# Patient Record
Sex: MALE | Race: WHITE | URBAN - METROPOLITAN AREA
[De-identification: names, ages, dates, MRNs, and addresses within clinical notes are randomized per-mention and may not be internally consistent; named-entity substitution may affect disease eponyms.]

---

## 2017-01-03 DIAGNOSIS — F98.8 ADD (ATTENTION DEFICIT DISORDER): ICD-10-CM

## 2017-01-03 RX ORDER — DEXTROAMPHETAMINE SACCHARATE, AMPHETAMINE ASPARTATE, DEXTROAMPHETAMINE SULFATE AND AMPHETAMINE SULFATE 2.5; 2.5; 2.5; 2.5 MG/1; MG/1; MG/1; MG/1
10 TABLET ORAL
Qty: 30 TAB | Refills: 0 | Status: SHIPPED | OUTPATIENT
Start: 2017-01-03 | End: 2017-02-01 | Stop reason: SDUPTHER

## 2017-01-03 RX ORDER — DEXTROAMPHETAMINE SACCHARATE, AMPHETAMINE ASPARTATE MONOHYDRATE, DEXTROAMPHETAMINE SULFATE AND AMPHETAMINE SULFATE 7.5; 7.5; 7.5; 7.5 MG/1; MG/1; MG/1; MG/1
30 CAPSULE, EXTENDED RELEASE ORAL DAILY
Qty: 30 CAP | Refills: 0 | Status: SHIPPED | OUTPATIENT
Start: 2017-01-03 | End: 2017-02-01 | Stop reason: SDUPTHER

## 2017-02-01 DIAGNOSIS — F98.8 ADD (ATTENTION DEFICIT DISORDER): ICD-10-CM

## 2017-02-01 RX ORDER — DEXTROAMPHETAMINE SACCHARATE, AMPHETAMINE ASPARTATE, DEXTROAMPHETAMINE SULFATE AND AMPHETAMINE SULFATE 2.5; 2.5; 2.5; 2.5 MG/1; MG/1; MG/1; MG/1
10 TABLET ORAL
Qty: 30 TAB | Refills: 0 | Status: SHIPPED | OUTPATIENT
Start: 2017-02-01 | End: 2017-03-03 | Stop reason: SDUPTHER

## 2017-02-01 RX ORDER — DEXTROAMPHETAMINE SACCHARATE, AMPHETAMINE ASPARTATE MONOHYDRATE, DEXTROAMPHETAMINE SULFATE AND AMPHETAMINE SULFATE 7.5; 7.5; 7.5; 7.5 MG/1; MG/1; MG/1; MG/1
30 CAPSULE, EXTENDED RELEASE ORAL DAILY
Qty: 30 CAP | Refills: 0 | Status: SHIPPED | OUTPATIENT
Start: 2017-02-01 | End: 2017-03-03 | Stop reason: SDUPTHER

## 2017-02-01 NOTE — TELEPHONE ENCOUNTER
----- Message from Romulo Louis sent at 2/1/2017 11:30 AM EST -----  Regarding: Dr. Desiree Btehea   Pt called requesting two prescriptions for Adderall 10mg 30mg. Pt best contact number is [phone].

## 2017-03-03 DIAGNOSIS — F98.8 ADD (ATTENTION DEFICIT DISORDER): ICD-10-CM

## 2017-03-03 RX ORDER — DEXTROAMPHETAMINE SACCHARATE, AMPHETAMINE ASPARTATE MONOHYDRATE, DEXTROAMPHETAMINE SULFATE AND AMPHETAMINE SULFATE 7.5; 7.5; 7.5; 7.5 MG/1; MG/1; MG/1; MG/1
30 CAPSULE, EXTENDED RELEASE ORAL DAILY
Qty: 30 CAP | Refills: 0 | Status: SHIPPED | OUTPATIENT
Start: 2017-03-03 | End: 2017-03-31 | Stop reason: SDUPTHER

## 2017-03-03 RX ORDER — DEXTROAMPHETAMINE SACCHARATE, AMPHETAMINE ASPARTATE, DEXTROAMPHETAMINE SULFATE AND AMPHETAMINE SULFATE 2.5; 2.5; 2.5; 2.5 MG/1; MG/1; MG/1; MG/1
10 TABLET ORAL
Qty: 30 TAB | Refills: 0 | Status: SHIPPED | OUTPATIENT
Start: 2017-03-03 | End: 2017-03-31 | Stop reason: SDUPTHER

## 2017-03-03 NOTE — TELEPHONE ENCOUNTER
Tried to call home number, busy signal. Mobile number states that mailbox is full and can not except anymore messages. Scripts will be up front per Nurse Jennifer Leiva.

## 2017-03-31 DIAGNOSIS — F98.8 ADD (ATTENTION DEFICIT DISORDER): ICD-10-CM

## 2017-03-31 RX ORDER — DEXTROAMPHETAMINE SACCHARATE, AMPHETAMINE ASPARTATE, DEXTROAMPHETAMINE SULFATE AND AMPHETAMINE SULFATE 2.5; 2.5; 2.5; 2.5 MG/1; MG/1; MG/1; MG/1
10 TABLET ORAL
Qty: 30 TAB | Refills: 0 | Status: SHIPPED | OUTPATIENT
Start: 2017-04-03 | End: 2017-05-01 | Stop reason: SDUPTHER

## 2017-03-31 RX ORDER — DEXTROAMPHETAMINE SACCHARATE, AMPHETAMINE ASPARTATE MONOHYDRATE, DEXTROAMPHETAMINE SULFATE AND AMPHETAMINE SULFATE 7.5; 7.5; 7.5; 7.5 MG/1; MG/1; MG/1; MG/1
30 CAPSULE, EXTENDED RELEASE ORAL DAILY
Qty: 30 CAP | Refills: 0 | Status: SHIPPED | OUTPATIENT
Start: 2017-04-03 | End: 2017-05-02 | Stop reason: SDUPTHER

## 2017-03-31 NOTE — TELEPHONE ENCOUNTER
----- Message from [facility] sent at 3/30/2017  6:09 PM EDT -----  Regarding: Dr. George Robbins  Pt request a refill on Rx \"Adderrall 30mg extended release\" and \" Adderrall 10mg instant release\" . Pt use [facility] on file. Pt can be contacted at [phone].

## 2017-03-31 NOTE — LETTER
4/4/2017 8:36 AM 
 
Mr. Jennie Cameron [address] [address] Dear  Phoebe Peterson: We recently refilled your medications. Please call our office at [phone] and schedule a follow up appointment for your continued care and before next medication refill. Sincerely, Juan Hilliard MD

## 2017-04-01 NOTE — TELEPHONE ENCOUNTER
Medication refill authorized. Please encourage follow up for office visit appt.      Must follow up for further refills since it has been > 6 months for a controlled substance

## 2017-06-05 ENCOUNTER — OFFICE VISIT (OUTPATIENT)
Dept: INTERNAL MEDICINE CLINIC | Age: 22
End: 2017-06-05

## 2017-06-05 VITALS
WEIGHT: 122 LBS | RESPIRATION RATE: 16 BRPM | SYSTOLIC BLOOD PRESSURE: 120 MMHG | HEIGHT: 65 IN | TEMPERATURE: 98.5 F | HEART RATE: 106 BPM | BODY MASS INDEX: 20.33 KG/M2 | DIASTOLIC BLOOD PRESSURE: 73 MMHG | OXYGEN SATURATION: 100 %

## 2017-06-05 DIAGNOSIS — E53.8 B12 DEFICIENCY: ICD-10-CM

## 2017-06-05 DIAGNOSIS — J30.9 ALLERGIC RHINITIS, UNSPECIFIED ALLERGIC RHINITIS TRIGGER, UNSPECIFIED RHINITIS SEASONALITY: ICD-10-CM

## 2017-06-05 DIAGNOSIS — F98.8 ADD (ATTENTION DEFICIT DISORDER): Primary | ICD-10-CM

## 2017-06-05 DIAGNOSIS — E55.9 VITAMIN D DEFICIENCY: ICD-10-CM

## 2017-06-05 DIAGNOSIS — Z13.220 LIPID SCREENING: ICD-10-CM

## 2017-06-05 DIAGNOSIS — K21.9 GASTROESOPHAGEAL REFLUX DISEASE WITHOUT ESOPHAGITIS: ICD-10-CM

## 2017-06-05 RX ORDER — DEXTROAMPHETAMINE SACCHARATE, AMPHETAMINE ASPARTATE MONOHYDRATE, DEXTROAMPHETAMINE SULFATE AND AMPHETAMINE SULFATE 7.5; 7.5; 7.5; 7.5 MG/1; MG/1; MG/1; MG/1
30 CAPSULE, EXTENDED RELEASE ORAL DAILY
Qty: 30 CAP | Refills: 0 | Status: SHIPPED | OUTPATIENT
Start: 2017-06-05 | End: 2019-04-25 | Stop reason: SDUPTHER

## 2017-06-05 RX ORDER — DEXTROAMPHETAMINE SACCHARATE, AMPHETAMINE ASPARTATE, DEXTROAMPHETAMINE SULFATE AND AMPHETAMINE SULFATE 2.5; 2.5; 2.5; 2.5 MG/1; MG/1; MG/1; MG/1
10 TABLET ORAL
Qty: 30 TAB | Refills: 0 | Status: SHIPPED | OUTPATIENT
Start: 2017-07-05 | End: 2017-08-30 | Stop reason: SDUPTHER

## 2017-06-05 RX ORDER — MINERAL OIL
180 ENEMA (ML) RECTAL DAILY
Qty: 30 TAB | Refills: 11 | Status: SHIPPED | OUTPATIENT
Start: 2017-06-05

## 2017-06-05 RX ORDER — FLUTICASONE PROPIONATE 50 MCG
2 SPRAY, SUSPENSION (ML) NASAL DAILY
Qty: 1 BOTTLE | Refills: 5 | Status: SHIPPED | OUTPATIENT
Start: 2017-06-05

## 2017-06-05 RX ORDER — DEXTROAMPHETAMINE SACCHARATE, AMPHETAMINE ASPARTATE, DEXTROAMPHETAMINE SULFATE AND AMPHETAMINE SULFATE 2.5; 2.5; 2.5; 2.5 MG/1; MG/1; MG/1; MG/1
10 TABLET ORAL
Qty: 30 TAB | Refills: 0 | Status: SHIPPED | OUTPATIENT
Start: 2017-06-05 | End: 2017-08-30 | Stop reason: SDUPTHER

## 2017-06-05 RX ORDER — DEXTROAMPHETAMINE SACCHARATE, AMPHETAMINE ASPARTATE MONOHYDRATE, DEXTROAMPHETAMINE SULFATE AND AMPHETAMINE SULFATE 7.5; 7.5; 7.5; 7.5 MG/1; MG/1; MG/1; MG/1
30 CAPSULE, EXTENDED RELEASE ORAL DAILY
Qty: 30 CAP | Refills: 0 | Status: SHIPPED | OUTPATIENT
Start: 2017-07-05 | End: 2018-10-01 | Stop reason: SDUPTHER

## 2017-06-05 RX ORDER — DEXTROAMPHETAMINE SACCHARATE, AMPHETAMINE ASPARTATE MONOHYDRATE, DEXTROAMPHETAMINE SULFATE AND AMPHETAMINE SULFATE 7.5; 7.5; 7.5; 7.5 MG/1; MG/1; MG/1; MG/1
30 CAPSULE, EXTENDED RELEASE ORAL DAILY
Qty: 30 CAP | Refills: 0 | Status: SHIPPED | OUTPATIENT
Start: 2017-08-05 | End: 2018-05-31 | Stop reason: SDUPTHER

## 2017-06-05 RX ORDER — DEXTROAMPHETAMINE SACCHARATE, AMPHETAMINE ASPARTATE, DEXTROAMPHETAMINE SULFATE AND AMPHETAMINE SULFATE 2.5; 2.5; 2.5; 2.5 MG/1; MG/1; MG/1; MG/1
10 TABLET ORAL
Qty: 30 TAB | Refills: 0 | Status: SHIPPED | OUTPATIENT
Start: 2017-08-05 | End: 2017-08-30 | Stop reason: SDUPTHER

## 2017-06-05 NOTE — PROGRESS NOTES
rm 13    Chief Complaint   Patient presents with    Medication Evaluation     f/u     1. Have you been to the ER, urgent care clinic since your last visit? Hospitalized since your last visit? No    2. Have you seen or consulted any other health care providers outside of the [address] [place] [address] since your last visit? Include any pap smears or colon screening.  No     Health Maintenance Due   Topic Date Due    DTaP/Tdap/Td series (1 - Tdap) 02/16/2014     PHQ over the last two weeks 6/5/2017   Little interest or pleasure in doing things Not at all   Feeling down, depressed or hopeless Not at all   Total Score PHQ 2 0       No living will on file, information given with AVS

## 2017-06-05 NOTE — MR AVS SNAPSHOT
Visit Information Date & Time Provider Department Dept. Phone Encounter #  
 6/5/2017  1:30 PM Trang Larios, [address] [place] [address] and Internal Medicine [phone] [id number] Follow-up Instructions Return in about 4 months (around 10/5/2017), or if symptoms worsen or fail to improve, for ADD. Upcoming Health Maintenance Date Due DTaP/Tdap/Td series (1 - Tdap) 2/16/2014 INFLUENZA AGE 9 TO ADULT 8/1/2017 Allergies as of 6/5/2017  Review Complete On: 6/5/2017 By: Trang Larios MD  
 No Known Allergies Current Immunizations  Reviewed on 5/29/2015 No immunizations on file. Not reviewed this visit You Were Diagnosed With   
  
 Codes Comments ADD (attention deficit disorder)    -  Primary ICD-10-CM: F98.8 ICD-9-CM: 314.00 Allergic rhinitis, unspecified allergic rhinitis trigger, unspecified rhinitis seasonality     ICD-10-CM: J30.9 ICD-9-CM: 477.9 Gastroesophageal reflux disease without esophagitis     ICD-10-CM: K21.9 ICD-9-CM: 530.81 Vitamin D deficiency     ICD-10-CM: E55.9 ICD-9-CM: 268.9 B12 deficiency     ICD-10-CM: E53.8 ICD-9-CM: 266.2 Lipid screening     ICD-10-CM: F58.973 ICD-9-CM: V77.91 Vitals BP Pulse Temp Resp Height(growth percentile) Weight(growth percentile) 120/73 (BP 1 Location: Left arm, BP Patient Position: Sitting) (!) 106 98.5 °F (36.9 °C) (Oral) 16 5' 5\" (1.651 m) 122 lb (55.3 kg) SpO2 BMI Smoking Status 100% 20.3 kg/m2 Former Smoker BMI and BSA Data Body Mass Index Body Surface Area  
 20.3 kg/m 2 1.59 m 2 Preferred Pharmacy Pharmacy Name Phone [id number] [phone] Your Updated Medication List  
  
   
This list is accurate as of: 6/5/17  2:03 PM.  Always use your most recent med list.  
  
  
  
  
 cholecalciferol 1,000 unit tablet Commonly known as:  VITAMIN D3 Take 1 Tab by mouth daily. cyanocobalamin 500 mcg tablet Commonly known as:  B-12 DOTS Take 1 Tab by mouth daily. * amphetamine-dextroamphetamine XR 30 mg XR capsule Commonly known as:  ADDERALL XR Take 1 Cap (30 mg total) by mouth daily. Max Daily Amount: 30 mg  
  
 * dextroamphetamine-amphetamine 10 mg tablet Commonly known as:  ADDERALL Take 1 Tab (10 mg total) by mouth daily as needed. With evening classes/work * amphetamine-dextroamphetamine XR 30 mg XR capsule Commonly known as:  ADDERALL XR Take 1 Cap (30 mg total) by mouth dailyEarliest Fill Date: 7/5/17. Max Daily Amount: 30 mg  
Start taking on:  7/5/2017 * dextroamphetamine-amphetamine 10 mg tablet Commonly known as:  ADDERALL Take 1 Tab (10 mg total) by mouth daily as neededEarliest Fill Date: 7/5/17. With evening classes/work Start taking on:  7/5/2017 * dextroamphetamine-amphetamine 10 mg tablet Commonly known as:  ADDERALL Take 1 Tab (10 mg total) by mouth daily as neededEarliest Fill Date: 8/5/17. With evening classes/work Start taking on:  8/5/2017 * amphetamine-dextroamphetamine XR 30 mg XR capsule Commonly known as:  ADDERALL XR Take 1 Cap (30 mg total) by mouth dailyEarliest Fill Date: 8/5/17. Max Daily Amount: 30 mg  
Start taking on:  8/5/2017  
  
 fexofenadine 180 mg tablet Commonly known as:  Melanie Don Take 1 Tab by mouth daily. fluticasone 50 mcg/actuation nasal spray Commonly known as:  Catarina Fray 2 Sprays by Both Nostrils route daily. Omeprazole delayed release 20 mg tablet Commonly known as:  PRILOSEC D/R Take 1 Tab by mouth daily. * Notice: This list has 6 medication(s) that are the same as other medications prescribed for you. Read the directions carefully, and ask your doctor or other care provider to review them with you. Prescriptions Printed Refills dextroamphetamine-amphetamine (ADDERALL) 10 mg tablet 0 Starting on: 2017 Sig: Take 1 Tab (10 mg total) by mouth daily as neededEarliest Fill Date: 17. With evening classes/work Class: Print Route: Oral  
 amphetamine-dextroamphetamine XR (ADDERALL XR) 30 mg XR capsule 0 Starting on: 2017 Sig: Take 1 Cap (30 mg total) by mouth dailyEarliest Fill Date: 17. Max Daily Amount: 30 mg  
 Class: Print Route: Oral  
 amphetamine-dextroamphetamine XR (ADDERALL XR) 30 mg XR capsule 0 Starting on: 2017 Sig: Take 1 Cap (30 mg total) by mouth dailyEarliest Fill Date: 17. Max Daily Amount: 30 mg  
 Class: Print Route: Oral  
 amphetamine-dextroamphetamine XR (ADDERALL XR) 30 mg XR capsule 0 Sig: Take 1 Cap (30 mg total) by mouth daily. Max Daily Amount: 30 mg  
 Class: Print Route: Oral  
 dextroamphetamine-amphetamine (ADDERALL) 10 mg tablet 0 Starting on: 2017 Sig: Take 1 Tab (10 mg total) by mouth daily as neededEarliest Fill Date: 17. With evening classes/work Class: Print Route: Oral  
 dextroamphetamine-amphetamine (ADDERALL) 10 mg tablet 0 Sig: Take 1 Tab (10 mg total) by mouth daily as needed. With evening classes/work Class: Print Route: Oral  
  
Prescriptions Sent to Pharmacy Refills  
 fluticasone (FLONASE) 50 mcg/actuation nasal spray 5 Si Sprays by Both Nostrils route daily. Class: Normal  
 Pharmacy: [id number] - [address] Ph #: [phone] Route: Both Nostrils  
 fexofenadine (ALLEGRA) 180 mg tablet 11 Sig: Take 1 Tab by mouth daily. Class: Normal  
 Pharmacy: [facility]/pharmacy #[id number] - [address] Ph #: [phone] Route: Oral  
  
Follow-up Instructions Return in about 4 months (around 10/5/2017), or if symptoms worsen or fail to improve, for ADD. To-Do List   
 Around 06/08/2017 Lab:  CBC WITH AUTOMATED DIFF Around 06/08/2017 Lab:  LIPID PANEL Around 06/08/2017 Lab:  METABOLIC PANEL, COMPREHENSIVE Around 06/08/2017 Lab:  VITAMIN B12 Around 06/08/2017 Lab:  VITAMIN D, 25 HYDROXY Patient Instructions Ozzy Alegre 1721 What is a living will? A living will is a legal form you use to write down the kind of care you want at the end of your life. It is used by the health professionals who will treat you if you aren't able to decide for yourself. If you put your wishes in writing, your loved ones and others will know what kind of care you want. They won't need to guess. This can ease your mind and be helpful to others. A living will is not the same as an estate or property will. An estate will explains what you want to happen with your money and property after you die. Is a living will a legal document? A living will is a legal document. Each state has its own laws about living nguyen. If you move to another state, make sure that your living will is legal in the state where you now live. Or you might use a universal form that has been approved by many states. This kind of form can sometimes be completed and stored online. Your electronic copy will then be available wherever you have a connection to the Internet. In most cases, doctors will respect your wishes even if you have a form from a different state. · You don't need an  to complete a living will. But legal advice can be helpful if your state's laws are unclear, your health history is complicated, or your family can't agree on what should be in your living will. · You can change your living will at any time. Some people find that their wishes about end-of-life care change as their health changes.  
· In addition to making a living will, think about completing a medical power of  form. This form lets you name the person you want to make end-of-life treatment decisions for you (your \"health care agent\") if you're not able to. Many hospitals and nursing homes will give you the forms you need to complete a living will and a medical power of . · Your living will is used only if you can't make or communicate decisions for yourself anymore. If you become able to make decisions again, you can accept or refuse any treatment, no matter what you wrote in your living will. · Your state may offer an online registry. This is a place where you can store your living will online so the doctors and nurses who need to treat you can find it right away. What should you think about when creating a living will? Talk about your end-of-life wishes with your family members and your doctor. Let them know what you want. That way the people making decisions for you won't be surprised by your choices. Think about these questions as you make your living will: · Do you know enough about life support methods that might be used? If not, talk to your doctor so you know what might be done if you can't breathe on your own, your heart stops, or you're unable to swallow. · What things would you still want to be able to do after you receive life-support methods? Would you want to be able to walk? To speak? To eat on your own? To live without the help of machines? · If you have a choice, where do you want to be cared for? In your home? At a hospital or nursing home? · Do you want certain Jewish practices performed if you become very ill? · If you have a choice at the end of your life, where would you prefer to die? At home? In a hospital or nursing home? Somewhere else? · Would you prefer to be buried or cremated? · Do you want your organs to be donated after you die? What should you do with your living will?  
· Make sure that your family members and your health care agent have copies of your living will. · Give your doctor a copy of your living will to keep in your medical record. If you have more than one doctor, make sure that each one has a copy. · You may want to put a copy of your living will where it can be easily found. Where can you learn more? Go to http://elena-poppy.info/. Enter A488 in the search box to learn more about \"Learning About Living Paulo. \" Current as of: February 24, 2016 Content Version: 11.2 © 4008-4480 DailyBurn. Care instructions adapted under license by Boxxet (which disclaims liability or warranty for this information). If you have questions about a medical condition or this instruction, always ask your healthcare professional. [address] any warranty or liability for your use of this information. Introducing [facility]! Howard Part introduces [facility] patient portal. Now you can access parts of your medical record, email your doctor's office, and request medication refills online. 1. In your internet browser, go to [email or web address] 2. Click on the First Time User? Click Here link in the Sign In box. You will see the New Member Sign Up page. 3. Enter your [facility] Access Code exactly as it appears below. You will not need to use this code after youve completed the sign-up process. If you do not sign up before the expiration date, you must request a new code. · [facility] Access Code: [email or web address] Expires: 9/3/2017  2:03 PM 
 
4. Enter the last four digits of your Social Security Number (xxxx) and Date of Birth (mm/dd/yyyy) as indicated and click Submit. You will be taken to the next sign-up page. 5. Create a Stockpulset ID. This will be your [facility] login ID and cannot be changed, so think of one that is secure and easy to remember. 6. Create a Stockpulset password. You can change your password at any time. 7. Enter your Password Reset Question and Answer. This can be used at a later time if you forget your password. 8. Enter your e-mail address. You will receive e-mail notification when new information is available in [address] 9. Click Sign Up. You can now view and download portions of your medical record. 10. Click the Download Summary menu link to download a portable copy of your medical information. If you have questions, please visit the Frequently Asked Questions section of the PumpUp website. Remember, PumpUp is NOT to be used for urgent needs. For medical emergencies, dial 911. Now available from your iPhone and Android! Please provide this summary of care documentation to your next provider. Your primary care clinician is listed as [address] If you have any questions after today's visit, please call [phone].

## 2017-06-05 NOTE — PATIENT INSTRUCTIONS
Learning About Shashank Fox  What is a living will? A living will is a legal form you use to write down the kind of care you want at the end of your life. It is used by the health professionals who will treat you if you aren't able to decide for yourself. If you put your wishes in writing, your loved ones and others will know what kind of care you want. They won't need to guess. This can ease your mind and be helpful to others. A living will is not the same as an estate or property will. An estate will explains what you want to happen with your money and property after you die. Is a living will a legal document? A living will is a legal document. Each state has its own laws about living nguyen. If you move to another state, make sure that your living will is legal in the state where you now live. Or you might use a universal form that has been approved by many states. This kind of form can sometimes be completed and stored online. Your electronic copy will then be available wherever you have a connection to the Internet. In most cases, doctors will respect your wishes even if you have a form from a different state. · You don't need an  to complete a living will. But legal advice can be helpful if your state's laws are unclear, your health history is complicated, or your family can't agree on what should be in your living will. · You can change your living will at any time. Some people find that their wishes about end-of-life care change as their health changes. · In addition to making a living will, think about completing a medical power of  form. This form lets you name the person you want to make end-of-life treatment decisions for you (your \"health care agent\") if you're not able to. Many hospitals and nursing homes will give you the forms you need to complete a living will and a medical power of .   · Your living will is used only if you can't make or communicate decisions for yourself anymore. If you become able to make decisions again, you can accept or refuse any treatment, no matter what you wrote in your living will. · Your state may offer an online registry. This is a place where you can store your living will online so the doctors and nurses who need to treat you can find it right away. What should you think about when creating a living will? Talk about your end-of-life wishes with your family members and your doctor. Let them know what you want. That way the people making decisions for you won't be surprised by your choices. Think about these questions as you make your living will:  · Do you know enough about life support methods that might be used? If not, talk to your doctor so you know what might be done if you can't breathe on your own, your heart stops, or you're unable to swallow. · What things would you still want to be able to do after you receive life-support methods? Would you want to be able to walk? To speak? To eat on your own? To live without the help of machines? · If you have a choice, where do you want to be cared for? In your home? At a hospital or nursing home? · Do you want certain Protestant practices performed if you become very ill? · If you have a choice at the end of your life, where would you prefer to die? At home? In a hospital or nursing home? Somewhere else? · Would you prefer to be buried or cremated? · Do you want your organs to be donated after you die? What should you do with your living will? · Make sure that your family members and your health care agent have copies of your living will. · Give your doctor a copy of your living will to keep in your medical record. If you have more than one doctor, make sure that each one has a copy. · You may want to put a copy of your living will where it can be easily found. Where can you learn more? Go to http://elena-poppy.info/.   Enter O094 in the search box to learn more about \"Learning About Living Enzo Davis. \"  Current as of: February 24, 2016  Content Version: 11.2  © 6118-3082 58.com, Incorporated. Care instructions adapted under license by Pertino (which disclaims liability or warranty for this information). If you have questions about a medical condition or this instruction, always ask your healthcare professional. [address] any warranty or liability for your use of this information.

## 2017-06-05 NOTE — PROGRESS NOTES
HISTORY OF PRESENT ILLNESS  Vernon Carmona is a 23 y.o. male. HPI  Presents for f/u ADD    Pt found 30 mg adderall XR to be more effective than the 25 mg    Still gets benefit    Taking allegra and flonase with good benefit    GI sx stable. Past medical, Social, and Family history reviewed  Medications reviewed and updated. ROS  Complete ROS reviewed and negative or stable except as noted in HPI. Physical Exam   Constitutional: He is oriented to person, place, and time. He appears well-nourished. No distress. HENT:   Head: Normocephalic and atraumatic. Eyes: EOM are normal. Pupils are equal, round, and reactive to light. No scleral icterus. Neck: Normal range of motion. Neck supple. No JVD present. Cardiovascular: Normal rate, regular rhythm and normal heart sounds. Exam reveals no gallop and no friction rub. No murmur heard. Pulmonary/Chest: Effort normal and breath sounds normal. No respiratory distress. He has no wheezes. He has no rales. Abdominal: Soft. Bowel sounds are normal. He exhibits no distension. There is no tenderness. Musculoskeletal: Normal range of motion. He exhibits no edema. Lymphadenopathy:     He has no cervical adenopathy. Neurological: He is alert and oriented to person, place, and time. He exhibits normal muscle tone. Skin: Skin is warm. No rash noted. Psychiatric: He has a normal mood and affect. Nursing note and vitals reviewed. Prior labs reviewed. ASSESSMENT and PLAN    ICD-10-CM ICD-9-CM    1. ADD (attention deficit disorder) F98.8 314.00 dextroamphetamine-amphetamine (ADDERALL) 10 mg tablet      amphetamine-dextroamphetamine XR (ADDERALL XR) 30 mg XR capsule      amphetamine-dextroamphetamine XR (ADDERALL XR) 30 mg XR capsule      amphetamine-dextroamphetamine XR (ADDERALL XR) 30 mg XR capsule      dextroamphetamine-amphetamine (ADDERALL) 10 mg tablet      dextroamphetamine-amphetamine (ADDERALL) 10 mg tablet   2.  Allergic rhinitis, unspecified allergic rhinitis trigger, unspecified rhinitis seasonality J30.9 477.9 fluticasone (FLONASE) 50 mcg/actuation nasal spray      fexofenadine (ALLEGRA) 180 mg tablet   3. Gastroesophageal reflux disease without esophagitis K21.9 530.81 CBC WITH AUTOMATED DIFF      METABOLIC PANEL, COMPREHENSIVE   4. Vitamin D deficiency E55.9 268.9 VITAMIN D, 25 HYDROXY   5. B12 deficiency E53.8 266.2 VITAMIN B12   6. Lipid screening Z13.220 V77.91 LIPID PANEL     Follow-up Disposition:  Return in about 4 months (around 10/5/2017), or if symptoms worsen or fail to improve, for ADD.   results and schedule of future studies reviewed with patient  reviewed diet, exercise and weight   cardiovascular risk and specific lipid/LDL goals reviewed  reviewed medications and side effects in detail   Return for repeat labs  Refill meds  Continue current medications

## 2017-08-30 ENCOUNTER — OFFICE VISIT (OUTPATIENT)
Dept: INTERNAL MEDICINE CLINIC | Age: 22
End: 2017-08-30

## 2017-08-30 VITALS
OXYGEN SATURATION: 97 % | HEIGHT: 65 IN | DIASTOLIC BLOOD PRESSURE: 60 MMHG | BODY MASS INDEX: 21.06 KG/M2 | WEIGHT: 126.4 LBS | TEMPERATURE: 98.3 F | HEART RATE: 87 BPM | SYSTOLIC BLOOD PRESSURE: 96 MMHG | RESPIRATION RATE: 16 BRPM

## 2017-08-30 DIAGNOSIS — K21.9 GASTROESOPHAGEAL REFLUX DISEASE WITHOUT ESOPHAGITIS: ICD-10-CM

## 2017-08-30 DIAGNOSIS — F98.8 ADD (ATTENTION DEFICIT DISORDER): Primary | ICD-10-CM

## 2017-08-30 DIAGNOSIS — J30.9 ALLERGIC RHINITIS, UNSPECIFIED ALLERGIC RHINITIS TRIGGER, UNSPECIFIED RHINITIS SEASONALITY: ICD-10-CM

## 2017-08-30 RX ORDER — DEXTROAMPHETAMINE SACCHARATE, AMPHETAMINE ASPARTATE, DEXTROAMPHETAMINE SULFATE AND AMPHETAMINE SULFATE 3.75; 3.75; 3.75; 3.75 MG/1; MG/1; MG/1; MG/1
15 TABLET ORAL
Qty: 30 TAB | Refills: 0 | Status: SHIPPED | OUTPATIENT
Start: 2017-11-03 | End: 2017-12-01 | Stop reason: SDUPTHER

## 2017-08-30 RX ORDER — DEXTROAMPHETAMINE SACCHARATE, AMPHETAMINE ASPARTATE MONOHYDRATE, DEXTROAMPHETAMINE SULFATE AND AMPHETAMINE SULFATE 7.5; 7.5; 7.5; 7.5 MG/1; MG/1; MG/1; MG/1
30 CAPSULE, EXTENDED RELEASE ORAL DAILY
Qty: 30 CAP | Refills: 0 | Status: SHIPPED | OUTPATIENT
Start: 2017-10-04 | End: 2017-11-02

## 2017-08-30 RX ORDER — DEXTROAMPHETAMINE SACCHARATE, AMPHETAMINE ASPARTATE MONOHYDRATE, DEXTROAMPHETAMINE SULFATE AND AMPHETAMINE SULFATE 7.5; 7.5; 7.5; 7.5 MG/1; MG/1; MG/1; MG/1
30 CAPSULE, EXTENDED RELEASE ORAL DAILY
Qty: 30 CAP | Refills: 0 | Status: SHIPPED | OUTPATIENT
Start: 2017-11-03 | End: 2017-12-02

## 2017-08-30 RX ORDER — DEXTROAMPHETAMINE SACCHARATE, AMPHETAMINE ASPARTATE MONOHYDRATE, DEXTROAMPHETAMINE SULFATE AND AMPHETAMINE SULFATE 7.5; 7.5; 7.5; 7.5 MG/1; MG/1; MG/1; MG/1
30 CAPSULE, EXTENDED RELEASE ORAL DAILY
Qty: 30 CAP | Refills: 0 | Status: SHIPPED | OUTPATIENT
Start: 2017-09-04 | End: 2017-10-03

## 2017-08-30 RX ORDER — DEXTROAMPHETAMINE SACCHARATE, AMPHETAMINE ASPARTATE, DEXTROAMPHETAMINE SULFATE AND AMPHETAMINE SULFATE 3.75; 3.75; 3.75; 3.75 MG/1; MG/1; MG/1; MG/1
15 TABLET ORAL
Qty: 30 TAB | Refills: 0 | Status: SHIPPED | OUTPATIENT
Start: 2017-10-04 | End: 2017-12-01 | Stop reason: SDUPTHER

## 2017-08-30 RX ORDER — DEXTROAMPHETAMINE SACCHARATE, AMPHETAMINE ASPARTATE, DEXTROAMPHETAMINE SULFATE AND AMPHETAMINE SULFATE 3.75; 3.75; 3.75; 3.75 MG/1; MG/1; MG/1; MG/1
15 TABLET ORAL
Qty: 30 TAB | Refills: 0 | Status: SHIPPED | OUTPATIENT
Start: 2017-09-04 | End: 2017-12-01 | Stop reason: SDUPTHER

## 2017-08-30 NOTE — PROGRESS NOTES
Rm 15    Chief Complaint   Patient presents with    Medication Evaluation     1. Have you been to the ER, urgent care clinic since your last visit? Hospitalized since your last visit? No    2. Have you seen or consulted any other health care providers outside of the [facility] since your last visit? Include any pap smears or colon screening.  No    Health Maintenance Due   Topic Date Due    DTaP/Tdap/Td series (1 - Tdap) 02/16/2014    INFLUENZA AGE 9 TO ADULT  08/01/2017     PHQ over the last two weeks 8/30/2017   Little interest or pleasure in doing things Not at all   Feeling down, depressed or hopeless Not at all   Total Score PHQ 2 0

## 2017-08-30 NOTE — MR AVS SNAPSHOT
Visit Information Date & Time Provider Department Dept. Phone Encounter #  
 8/30/2017  4:30 PM Meron Samaniego [place] [address] and Internal Medicine [phone] [id number] Follow-up Instructions Return in about 3 months (around 11/30/2017), or if symptoms worsen or fail to improve, for ADHD. Upcoming Health Maintenance Date Due DTaP/Tdap/Td series (1 - Tdap) 2/16/2014 INFLUENZA AGE 9 TO ADULT 8/1/2017 Allergies as of 8/30/2017  Review Complete On: 8/30/2017 By: Dominique Packer MD  
 No Known Allergies Current Immunizations  Reviewed on 5/29/2015 No immunizations on file. Not reviewed this visit You Were Diagnosed With   
  
 Codes Comments ADD (attention deficit disorder)    -  Primary ICD-10-CM: F98.8 ICD-9-CM: 314.00 Allergic rhinitis, unspecified allergic rhinitis trigger, unspecified rhinitis seasonality     ICD-10-CM: J30.9 ICD-9-CM: 477.9 Gastroesophageal reflux disease without esophagitis     ICD-10-CM: K21.9 ICD-9-CM: 530.81 Vitals BP Pulse Temp Resp Height(growth percentile) Weight(growth percentile) 96/60 (BP 1 Location: Right arm, BP Patient Position: Sitting) 87 98.3 °F (36.8 °C) (Oral) 16 5' 5\" (1.651 m) 126 lb 6.4 oz (57.3 kg) SpO2 BMI Smoking Status 97% 21.03 kg/m2 Former Smoker BMI and BSA Data Body Mass Index Body Surface Area 21.03 kg/m 2 1.62 m 2 Preferred Pharmacy Pharmacy Name Phone [facility]/PHARMACY #[id number] [facility]ad [facility] JIM FERNANDEZ/ Irineo Adams [facility] [phone] Your Updated Medication List  
  
   
This list is accurate as of: 8/30/17  5:19 PM.  Always use your most recent med list.  
  
  
  
  
 * amphetamine-dextroamphetamine XR 30 mg XR capsule Commonly known as:  ADDERALL XR Take 1 Cap (30 mg total) by mouth daily. Max Daily Amount: 30 mg  
  
 * amphetamine-dextroamphetamine XR 30 mg XR capsule Commonly known as:  ADDERALL XR Take 1 Cap (30 mg total) by mouth dailyEarliest Fill Date: 7/5/17. Max Daily Amount: 30 mg  
  
 * amphetamine-dextroamphetamine XR 30 mg XR capsule Commonly known as:  ADDERALL XR Take 1 Cap (30 mg total) by mouth dailyEarliest Fill Date: 8/5/17. Max Daily Amount: 30 mg  
  
 * amphetamine-dextroamphetamine XR 30 mg XR capsule Commonly known as:  ADDERALL XR Take 1 Cap (30 mg total) by mouth dailyEarliest Fill Date: 9/4/17. Max Daily Amount: 30 mg  
Start taking on:  9/4/2017 * dextroamphetamine-amphetamine 15 mg tablet Commonly known as:  ADDERALL Take 1 Tab (15 mg total) by mouth daily as neededEarliest Fill Date: 9/4/17. With evening classes/work Start taking on:  9/4/2017 * amphetamine-dextroamphetamine XR 30 mg XR capsule Commonly known as:  ADDERALL XR Take 1 Cap (30 mg total) by mouth dailyEarliest Fill Date: 10/4/17. Max Daily Amount: 30 mg  
Start taking on:  10/4/2017 * dextroamphetamine-amphetamine 15 mg tablet Commonly known as:  ADDERALL Take 1 Tab (15 mg total) by mouth daily as neededEarliest Fill Date: 10/4/17. With evening classes/work Start taking on:  10/4/2017 * amphetamine-dextroamphetamine XR 30 mg XR capsule Commonly known as:  ADDERALL XR Take 1 Cap (30 mg total) by mouth dailyEarliest Fill Date: 11/3/17. Max Daily Amount: 30 mg  
Start taking on:  11/3/2017 * dextroamphetamine-amphetamine 15 mg tablet Commonly known as:  ADDERALL Take 1 Tab (15 mg total) by mouth daily as neededEarliest Fill Date: 11/3/17. With evening classes/work Start taking on:  11/3/2017  
  
 cholecalciferol 1,000 unit tablet Commonly known as:  VITAMIN D3 Take 1 Tab by mouth daily. cyanocobalamin 500 mcg tablet Commonly known as:  B-12 DOTS Take 1 Tab by mouth daily. fexofenadine 180 mg tablet Commonly known as:  Ovid Ficieshae Take 1 Tab by mouth daily. fluticasone 50 mcg/actuation nasal spray Commonly known as:  Melrose Capes 2 Sprays by Both Nostrils route daily. Omeprazole delayed release 20 mg tablet Commonly known as:  PRILOSEC D/R Take 1 Tab by mouth daily. * Notice: This list has 9 medication(s) that are the same as other medications prescribed for you. Read the directions carefully, and ask your doctor or other care provider to review them with you. Prescriptions Printed Refills  
 amphetamine-dextroamphetamine XR (ADDERALL XR) 30 mg XR capsule 0 Starting on: 9/4/2017 Sig: Take 1 Cap (30 mg total) by mouth dailyEarliest Fill Date: 9/4/17. Max Daily Amount: 30 mg  
 Class: Print Route: Oral  
 amphetamine-dextroamphetamine XR (ADDERALL XR) 30 mg XR capsule 0 Starting on: 10/4/2017 Sig: Take 1 Cap (30 mg total) by mouth dailyEarliest Fill Date: 10/4/17. Max Daily Amount: 30 mg  
 Class: Print Route: Oral  
 amphetamine-dextroamphetamine XR (ADDERALL XR) 30 mg XR capsule 0 Starting on: 11/3/2017 Sig: Take 1 Cap (30 mg total) by mouth dailyEarliest Fill Date: 11/3/17. Max Daily Amount: 30 mg  
 Class: Print Route: Oral  
 dextroamphetamine-amphetamine (ADDERALL) 15 mg tablet 0 Starting on: 11/3/2017 Sig: Take 1 Tab (15 mg total) by mouth daily as neededEarliest Fill Date: 11/3/17. With evening classes/work Class: Print Route: Oral  
 dextroamphetamine-amphetamine (ADDERALL) 15 mg tablet 0 Starting on: 10/4/2017 Sig: Take 1 Tab (15 mg total) by mouth daily as neededEarliest Fill Date: 10/4/17. With evening classes/work Class: Print Route: Oral  
 dextroamphetamine-amphetamine (ADDERALL) 15 mg tablet 0 Starting on: 9/4/2017 Sig: Take 1 Tab (15 mg total) by mouth daily as neededEarliest Fill Date: 9/4/17. With evening classes/work Class: Print Route: Oral  
  
Follow-up Instructions  Return in about 3 months (around 11/30/2017), or if symptoms worsen or fail to improve, for ADHD. Introducing hospitals & HEALTH SERVICES! [facility] introduces [employer] patient portal. Now you can access parts of your medical record, email your doctor's office, and request medication refills online. 1. In your internet browser, go to [email or web address] 2. Click on the First Time User? Click Here link in the Sign In box. You will see the New Member Sign Up page. 3. Enter your [employer] Access Code exactly as it appears below. You will not need to use this code after youve completed the sign-up process. If you do not sign up before the expiration date, you must request a new code. · [employer] Access Code: [email or web address] Expires: 9/3/2017  2:03 PM 
 
4. Enter the last four digits of your Social Security Number (xxxx) and Date of Birth (mm/dd/yyyy) as indicated and click Submit. You will be taken to the next sign-up page. 5. Create a [employer] ID. This will be your [employer] login ID and cannot be changed, so think of one that is secure and easy to remember. 6. Create a [employer] password. You can change your password at any time. 7. Enter your Password Reset Question and Answer. This can be used at a later time if you forget your password. 8. Enter your e-mail address. You will receive e-mail notification when new information is available in [address] 9. Click Sign Up. You can now view and download portions of your medical record. 10. Click the Download Summary menu link to download a portable copy of your medical information. If you have questions, please visit the Frequently Asked Questions section of the [employer] website. Remember, [employer] is NOT to be used for urgent needs. For medical emergencies, dial 911. Now available from your iPhone and Android! Please provide this summary of care documentation to your next provider. Your primary care clinician is listed as [address]  If you have any questions after today's visit, please call [phone].

## 2017-08-30 NOTE — PROGRESS NOTES
HISTORY OF PRESENT ILLNESS  Uday Joya is a 23 y.o. male. HPI  Presents for f/u allergy, adhd    Using flonase, allegra    adderall XR at 30 mg and IR at 10 mg don't have same \"full\" effect in recent months  Though, +helpful and maintains fair effect and benefit    No adverse effects reported    Past medical, Social, and Family history reviewed  Medications reviewed and updated. ROS  Complete ROS reviewed and negative or stable except as noted in HPI. Physical Exam   Constitutional: He is oriented to person, place, and time. He appears well-nourished. No distress. HENT:   Head: Normocephalic and atraumatic. Eyes: EOM are normal. Pupils are equal, round, and reactive to light. No scleral icterus. Neck: Normal range of motion. Neck supple. No JVD present. Cardiovascular: Normal rate, regular rhythm and normal heart sounds. Exam reveals no gallop and no friction rub. No murmur heard. Pulmonary/Chest: Effort normal and breath sounds normal. No respiratory distress. He has no wheezes. He has no rales. Abdominal: Soft. Bowel sounds are normal. He exhibits no distension. There is no tenderness. Musculoskeletal: Normal range of motion. He exhibits no edema. Lymphadenopathy:     He has no cervical adenopathy. Neurological: He is alert and oriented to person, place, and time. He exhibits normal muscle tone. Skin: Skin is warm. No rash noted. Psychiatric: He has a normal mood and affect. Nursing note and vitals reviewed. Prior labs reviewed. ASSESSMENT and PLAN    ICD-10-CM ICD-9-CM    1. ADD (attention deficit disorder) F98.8 314.00 dextroamphetamine-amphetamine (ADDERALL) 15 mg tablet      dextroamphetamine-amphetamine (ADDERALL) 15 mg tablet      dextroamphetamine-amphetamine (ADDERALL) 15 mg tablet   2. Allergic rhinitis, unspecified allergic rhinitis trigger, unspecified rhinitis seasonality J30.9 477.9    3.  Gastroesophageal reflux disease without esophagitis K21.9 530.81 Follow-up Disposition:  Return in about 3 months (around 11/30/2017), or if symptoms worsen or fail to improve, for ADHD.   results and schedule of future studies reviewed with patient  reviewed diet, exercise and weight   reviewed medications and side effects in detail   Agree to increase to 15 mg IR PM dosing  Continue XR dose at 30 mg for now  Continue other meds

## 2017-12-01 ENCOUNTER — OFFICE VISIT (OUTPATIENT)
Dept: INTERNAL MEDICINE CLINIC | Age: 22
End: 2017-12-01

## 2017-12-01 VITALS
BODY MASS INDEX: 21.13 KG/M2 | SYSTOLIC BLOOD PRESSURE: 104 MMHG | HEIGHT: 65 IN | RESPIRATION RATE: 16 BRPM | DIASTOLIC BLOOD PRESSURE: 69 MMHG | TEMPERATURE: 97.7 F | OXYGEN SATURATION: 98 % | WEIGHT: 126.8 LBS | HEART RATE: 73 BPM

## 2017-12-01 DIAGNOSIS — E53.8 B12 DEFICIENCY: ICD-10-CM

## 2017-12-01 DIAGNOSIS — E55.9 VITAMIN D DEFICIENCY: ICD-10-CM

## 2017-12-01 DIAGNOSIS — F98.8 ATTENTION DEFICIT DISORDER, UNSPECIFIED HYPERACTIVITY PRESENCE: Primary | ICD-10-CM

## 2017-12-01 DIAGNOSIS — Z00.00 WELL ADULT EXAM: ICD-10-CM

## 2017-12-01 DIAGNOSIS — J30.9 ALLERGIC RHINITIS, UNSPECIFIED CHRONICITY, UNSPECIFIED SEASONALITY, UNSPECIFIED TRIGGER: ICD-10-CM

## 2017-12-01 RX ORDER — DEXTROAMPHETAMINE SACCHARATE, AMPHETAMINE ASPARTATE MONOHYDRATE, DEXTROAMPHETAMINE SULFATE AND AMPHETAMINE SULFATE 7.5; 7.5; 7.5; 7.5 MG/1; MG/1; MG/1; MG/1
30 CAPSULE, EXTENDED RELEASE ORAL DAILY
Qty: 30 CAP | Refills: 0 | Status: SHIPPED | OUTPATIENT
Start: 2018-01-30 | End: 2018-02-28

## 2017-12-01 RX ORDER — DEXTROAMPHETAMINE SACCHARATE, AMPHETAMINE ASPARTATE, DEXTROAMPHETAMINE SULFATE AND AMPHETAMINE SULFATE 3.75; 3.75; 3.75; 3.75 MG/1; MG/1; MG/1; MG/1
15 TABLET ORAL
Qty: 30 TAB | Refills: 0 | Status: CANCELLED | OUTPATIENT
Start: 2017-12-01

## 2017-12-01 RX ORDER — DEXTROAMPHETAMINE SACCHARATE, AMPHETAMINE ASPARTATE, DEXTROAMPHETAMINE SULFATE AND AMPHETAMINE SULFATE 3.75; 3.75; 3.75; 3.75 MG/1; MG/1; MG/1; MG/1
15 TABLET ORAL
Qty: 30 TAB | Refills: 0 | Status: SHIPPED | OUTPATIENT
Start: 2017-12-01 | End: 2018-05-31 | Stop reason: SDUPTHER

## 2017-12-01 RX ORDER — DEXTROAMPHETAMINE SACCHARATE, AMPHETAMINE ASPARTATE MONOHYDRATE, DEXTROAMPHETAMINE SULFATE AND AMPHETAMINE SULFATE 7.5; 7.5; 7.5; 7.5 MG/1; MG/1; MG/1; MG/1
30 CAPSULE, EXTENDED RELEASE ORAL DAILY
Qty: 30 CAP | Refills: 0 | Status: SHIPPED | OUTPATIENT
Start: 2017-12-31 | End: 2018-01-29

## 2017-12-01 RX ORDER — DEXTROAMPHETAMINE SACCHARATE, AMPHETAMINE ASPARTATE, DEXTROAMPHETAMINE SULFATE AND AMPHETAMINE SULFATE 3.75; 3.75; 3.75; 3.75 MG/1; MG/1; MG/1; MG/1
15 TABLET ORAL
Qty: 30 TAB | Refills: 0 | Status: SHIPPED | OUTPATIENT
Start: 2017-12-31 | End: 2018-03-01 | Stop reason: SDUPTHER

## 2017-12-01 RX ORDER — DEXTROAMPHETAMINE SACCHARATE, AMPHETAMINE ASPARTATE, DEXTROAMPHETAMINE SULFATE AND AMPHETAMINE SULFATE 3.75; 3.75; 3.75; 3.75 MG/1; MG/1; MG/1; MG/1
15 TABLET ORAL
Qty: 30 TAB | Refills: 0 | Status: SHIPPED | OUTPATIENT
Start: 2018-01-30 | End: 2018-03-01 | Stop reason: SDUPTHER

## 2017-12-01 RX ORDER — DEXTROAMPHETAMINE SACCHARATE, AMPHETAMINE ASPARTATE MONOHYDRATE, DEXTROAMPHETAMINE SULFATE AND AMPHETAMINE SULFATE 7.5; 7.5; 7.5; 7.5 MG/1; MG/1; MG/1; MG/1
30 CAPSULE, EXTENDED RELEASE ORAL DAILY
Qty: 30 CAP | Refills: 0 | Status: SHIPPED | OUTPATIENT
Start: 2017-12-01 | End: 2017-12-30

## 2017-12-01 NOTE — MR AVS SNAPSHOT
Visit Information Date & Time Provider Department Dept. Phone Encounter #  
 12/1/2017  9:45 AM Korin Buenrostro, [address] [place] [address] and Internal Medicine [phone] [id number] Follow-up Instructions Return in about 3 months (around 3/1/2018), or if symptoms worsen or fail to improve, for ADHD. Upcoming Health Maintenance Date Due DTaP/Tdap/Td series (1 - Tdap) 2/16/2014 Allergies as of 12/1/2017  Review Complete On: 12/1/2017 By: Korin Buenrostro MD  
 No Known Allergies Current Immunizations  Reviewed on 5/29/2015 No immunizations on file. Not reviewed this visit You Were Diagnosed With   
  
 Codes Comments Attention deficit disorder, unspecified hyperactivity presence    -  Primary ICD-10-CM: F98.8 ICD-9-CM: 314.00   
 B12 deficiency     ICD-10-CM: E53.8 ICD-9-CM: 266.2 Vitamin D deficiency     ICD-10-CM: E55.9 ICD-9-CM: 268.9 Allergic rhinitis, unspecified chronicity, unspecified seasonality, unspecified trigger     ICD-10-CM: J30.9 ICD-9-CM: 477.9 Well adult exam     ICD-10-CM: Z00.00 ICD-9-CM: V70.0 Vitals BP Pulse Temp Resp Height(growth percentile) Weight(growth percentile) 104/69 (BP 1 Location: Left arm, BP Patient Position: Sitting) 73 97.7 °F (36.5 °C) (Oral) 16 5' 5\" (1.651 m) 126 lb 12.8 oz (57.5 kg) SpO2 BMI Smoking Status 98% 21.1 kg/m2 Former Smoker BMI and BSA Data Body Mass Index Body Surface Area  
 21.1 kg/m 2 1.62 m 2 Preferred Pharmacy Pharmacy Name Phone [facility]/PHARMACY #[id number] [facility] [phone] Your Updated Medication List  
  
   
This list is accurate as of: 12/1/17 11:03 AM.  Always use your most recent med list.  
  
  
  
  
 * amphetamine-dextroamphetamine XR 30 mg XR capsule Commonly known as:  ADDERALL XR Take 1 Cap (30 mg total) by mouth daily.   Max Daily Amount: 30 mg  
  
 * amphetamine-dextroamphetamine XR 30 mg XR capsule Commonly known as:  ADDERALL XR Take 1 Cap (30 mg total) by mouth dailyEarliest Fill Date: 7/5/17. Max Daily Amount: 30 mg  
  
 * amphetamine-dextroamphetamine XR 30 mg XR capsule Commonly known as:  ADDERALL XR Take 1 Cap (30 mg total) by mouth dailyEarliest Fill Date: 8/5/17. Max Daily Amount: 30 mg  
  
 * amphetamine-dextroamphetamine XR 30 mg XR capsule Commonly known as:  ADDERALL XR Take 1 Cap (30 mg total) by mouth dailyEarliest Fill Date: 11/3/17. Max Daily Amount: 30 mg  
  
 * amphetamine-dextroamphetamine XR 30 mg XR capsule Commonly known as:  ADDERALL XR Take 1 Cap (30 mg total) by mouth dailyEarliest Fill Date: 12/1/17. Max Daily Amount: 30 mg  
  
 * dextroamphetamine-amphetamine 15 mg tablet Commonly known as:  ADDERALL Take 1 Tab (15 mg total) by mouth daily as needed. With evening classes/work * amphetamine-dextroamphetamine XR 30 mg XR capsule Commonly known as:  ADDERALL XR Take 1 Cap (30 mg total) by mouth dailyEarliest Fill Date: 12/31/17. Max Daily Amount: 30 mg  
Start taking on:  12/31/2017 * dextroamphetamine-amphetamine 15 mg tablet Commonly known as:  ADDERALL Take 1 Tab (15 mg total) by mouth daily as neededEarliest Fill Date: 12/31/17. With evening classes/work Start taking on:  12/31/2017 * amphetamine-dextroamphetamine XR 30 mg XR capsule Commonly known as:  ADDERALL XR Take 1 Cap (30 mg total) by mouth dailyEarliest Fill Date: 1/30/18. Max Daily Amount: 30 mg  
Start taking on:  1/30/2018 * dextroamphetamine-amphetamine 15 mg tablet Commonly known as:  ADDERALL Take 1 Tab (15 mg total) by mouth daily as neededEarliest Fill Date: 1/30/18. With evening classes/work Start taking on:  1/30/2018 cholecalciferol 1,000 unit tablet Commonly known as:  VITAMIN D3 Take 1 Tab by mouth daily. cyanocobalamin 500 mcg tablet Commonly known as:  B-12 DOTS Take 1 Tab by mouth daily. fexofenadine 180 mg tablet Commonly known as:  [place] Take 1 Tab by mouth daily. fluticasone 50 mcg/actuation nasal spray Commonly known as:  Elizabeth Jumper 2 Sprays by Both Nostrils route daily. Omeprazole delayed release 20 mg tablet Commonly known as:  PRILOSEC D/R Take 1 Tab by mouth daily. * Notice: This list has 10 medication(s) that are the same as other medications prescribed for you. Read the directions carefully, and ask your doctor or other care provider to review them with you. Prescriptions Printed Refills  
 amphetamine-dextroamphetamine XR (ADDERALL XR) 30 mg XR capsule 0 Sig: Take 1 Cap (30 mg total) by mouth dailyEarliest Fill Date: 12/1/17. Max Daily Amount: 30 mg  
 Class: Print Route: Oral  
 amphetamine-dextroamphetamine XR (ADDERALL XR) 30 mg XR capsule 0 Starting on: 12/31/2017 Sig: Take 1 Cap (30 mg total) by mouth dailyEarliest Fill Date: 12/31/17. Max Daily Amount: 30 mg  
 Class: Print Route: Oral  
 amphetamine-dextroamphetamine XR (ADDERALL XR) 30 mg XR capsule 0 Starting on: 1/30/2018 Sig: Take 1 Cap (30 mg total) by mouth dailyEarliest Fill Date: 1/30/18. Max Daily Amount: 30 mg  
 Class: Print Route: Oral  
 dextroamphetamine-amphetamine (ADDERALL) 15 mg tablet 0 Starting on: 1/30/2018 Sig: Take 1 Tab (15 mg total) by mouth daily as neededEarliest Fill Date: 1/30/18. With evening classes/work Class: Print Route: Oral  
 dextroamphetamine-amphetamine (ADDERALL) 15 mg tablet 0 Starting on: 12/31/2017 Sig: Take 1 Tab (15 mg total) by mouth daily as neededEarliest Fill Date: 12/31/17. With evening classes/work Class: Print Route: Oral  
 dextroamphetamine-amphetamine (ADDERALL) 15 mg tablet 0 Sig: Take 1 Tab (15 mg total) by mouth daily as needed. With evening classes/work Class: Print  Route: Oral  
  
 Follow-up Instructions Return in about 3 months (around 3/1/2018), or if symptoms worsen or fail to improve, for ADHD. To-Do List   
 Around 12/07/2017 Lab:  CBC WITH AUTOMATED DIFF Around 12/07/2017 Lab:  LIPID PANEL Around 12/07/2017 Lab:  METABOLIC PANEL, COMPREHENSIVE Around 12/07/2017 Lab:  VITAMIN B12 Around 12/07/2017 Lab:  VITAMIN D, 25 HYDROXY Introducing [facility] & HEALTH SERVICES! [facility] introduces [employer] patient portal. Now you can access parts of your medical record, email your doctor's office, and request medication refills online. 1. In your internet browser, go to [email or web address] 2. Click on the First Time User? Click Here link in the Sign In box. You will see the New Member Sign Up page. 3. Enter your [employer] Access Code exactly as it appears below. You will not need to use this code after youve completed the sign-up process. If you do not sign up before the expiration date, you must request a new code. · [employer] Access Code: [email or web address] Expires: 12/12/2017 10:46 PM 
 
4. Enter the last four digits of your Social Security Number (xxxx) and Date of Birth (mm/dd/yyyy) as indicated and click Submit. You will be taken to the next sign-up page. 5. Create a [employer] ID. This will be your [employer] login ID and cannot be changed, so think of one that is secure and easy to remember. 6. Create a [employer] password. You can change your password at any time. 7. Enter your Password Reset Question and Answer. This can be used at a later time if you forget your password. 8. Enter your e-mail address. You will receive e-mail notification when new information is available in [address] 9. Click Sign Up. You can now view and download portions of your medical record. 10. Click the Download Summary menu link to download a portable copy of your medical information. If you have questions, please visit the Frequently Asked Questions section of the [employer] website. Remember, [employer] is NOT to be used for urgent needs. For medical emergencies, dial 911. Now available from your iPhone and Android! Please provide this summary of care documentation to your next provider. Your primary care clinician is listed as [address] If you have any questions after today's visit, please call [phone].

## 2017-12-01 NOTE — PROGRESS NOTES
Rm 15    Chief Complaint   Patient presents with    Medication Evaluation     1. Have you been to the ER, urgent care clinic since your last visit? Hospitalized since your last visit? No    2. Have you seen or consulted any other health care providers outside of the [address] [place] [address] since your last visit? Include any pap smears or colon screening.  No    Health Maintenance Due   Topic Date Due    DTaP/Tdap/Td series (1 - Tdap) 02/16/2014    Influenza Age 5 to Adult  08/01/2017   pt declines flu vaccine today    PHQ over the last two weeks 8/30/2017   Little interest or pleasure in doing things Not at all   Feeling down, depressed or hopeless Not at all   Total Score PHQ 2 0

## 2017-12-08 NOTE — PROGRESS NOTES
HISTORY OF PRESENT ILLNESS  Lashonda Quinones is a 23 y.o. male. HPI  Presents for f/u ADD, allergy    Reports doing well on current adderall regimen- XR + IR  No adverse effects    Good appetite  Weight stable. Allergy sx stable    Due for labs    Past medical, Social, and Family history reviewed  Medications reviewed and updated. ROS  Complete ROS reviewed and negative or stable except as noted in HPI. Physical Exam   Constitutional: He is oriented to person, place, and time. He appears well-nourished. No distress. HENT:   Head: Normocephalic and atraumatic. Eyes: EOM are normal. Pupils are equal, round, and reactive to light. No scleral icterus. Neck: Normal range of motion. Neck supple. No JVD present. Cardiovascular: Normal rate, regular rhythm and normal heart sounds. Exam reveals no gallop and no friction rub. No murmur heard. Pulmonary/Chest: Effort normal and breath sounds normal. No respiratory distress. He has no wheezes. He has no rales. Abdominal: Soft. Bowel sounds are normal. He exhibits no distension. There is no tenderness. Musculoskeletal: Normal range of motion. He exhibits no edema. Lymphadenopathy:     He has no cervical adenopathy. Neurological: He is alert and oriented to person, place, and time. He exhibits normal muscle tone. Skin: Skin is warm. No rash noted. Psychiatric: He has a normal mood and affect. Nursing note and vitals reviewed. Prior labs reviewed. ASSESSMENT and PLAN    ICD-10-CM ICD-9-CM    1.  Attention deficit disorder, unspecified hyperactivity presence F98.8 314.00 amphetamine-dextroamphetamine XR (ADDERALL XR) 30 mg XR capsule      amphetamine-dextroamphetamine XR (ADDERALL XR) 30 mg XR capsule      amphetamine-dextroamphetamine XR (ADDERALL XR) 30 mg XR capsule      dextroamphetamine-amphetamine (ADDERALL) 15 mg tablet      dextroamphetamine-amphetamine (ADDERALL) 15 mg tablet      dextroamphetamine-amphetamine (ADDERALL) 15 mg tablet   2. B12 deficiency E53.8 266.2 VITAMIN B12   3. Vitamin D deficiency E55.9 268.9 VITAMIN D, 25 HYDROXY   4. Allergic rhinitis, unspecified chronicity, unspecified seasonality, unspecified trigger J30.9 477.9    5. Well adult exam Z00.00 V70.0 CBC WITH AUTOMATED DIFF      LIPID PANEL      METABOLIC PANEL, COMPREHENSIVE     Follow-up Disposition:  Return in about 3 months (around 3/1/2018), or if symptoms worsen or fail to improve, for ADHD.   results and schedule of future studies reviewed with patient  reviewed diet, exercise and weight    cardiovascular risk and specific lipid/LDL goals reviewed  reviewed medications and side effects in detail   Continue current medications   Check labs

## 2018-01-31 ENCOUNTER — DOCUMENTATION ONLY (OUTPATIENT)
Dept: INTERNAL MEDICINE CLINIC | Age: 23
End: 2018-01-31

## 2018-01-31 NOTE — PROGRESS NOTES
HDIDBV:[id number];LSCDSGO Name:ST: ADHD Agents (Adderall IR/XR, Aptensio XR, Concerta, Daytrana, Desoxyn, Dexedrine, Dyanavel XR, Evekeo, Focalin IR/XR, Metadate CD/ER, Methylin, Procentra, Quillivant XR, QuilliChew ER, Ritalin IR/LA/SR, Vyvanse, Kapvay, Intuniv, Strattera, etc) PA CRIS;Status:Approved; Coverage Start Date:01/01/2018; Coverage End Date:01/31/2019;

## 2018-02-14 ENCOUNTER — OFFICE VISIT (OUTPATIENT)
Dept: INTERNAL MEDICINE CLINIC | Age: 23
End: 2018-02-14

## 2018-02-14 VITALS
HEIGHT: 65 IN | HEART RATE: 87 BPM | TEMPERATURE: 97.6 F | DIASTOLIC BLOOD PRESSURE: 70 MMHG | OXYGEN SATURATION: 95 % | WEIGHT: 119.2 LBS | RESPIRATION RATE: 16 BRPM | BODY MASS INDEX: 19.86 KG/M2 | SYSTOLIC BLOOD PRESSURE: 96 MMHG

## 2018-02-14 DIAGNOSIS — J02.9 SORE THROAT: Primary | ICD-10-CM

## 2018-02-14 LAB
FLUAV+FLUBV AG NOSE QL IA.RAPID: NEGATIVE POS/NEG
FLUAV+FLUBV AG NOSE QL IA.RAPID: NEGATIVE POS/NEG
VALID INTERNAL CONTROL?: YES

## 2018-02-14 NOTE — MR AVS SNAPSHOT
[address] [place] [address] 
[phone] Patient: Raven Gilbert MRN: [id number] :1995 Visit Information Date & Time Provider Department Dept. Phone Encounter #  
 2018  2:15 PM Carine Vann, [address] [place] [address] and Internal Medicine [phone] [id number] Follow-up Instructions Return if symptoms worsen or fail to improve. Upcoming Health Maintenance Date Due DTaP/Tdap/Td series (1 - Tdap) 2014 Allergies as of 2018  Review Complete On: 2018 By: Billy Lynn No Known Allergies Current Immunizations  Reviewed on 2015 No immunizations on file. Not reviewed this visit You Were Diagnosed With   
  
 Codes Comments Chills    -  Primary ICD-10-CM: R68.83 ICD-9-CM: 780.64 Sore throat     ICD-10-CM: J02.9 ICD-9-CM: 987 Vitals BP Pulse Temp Resp Height(growth percentile) Weight(growth percentile) 96/70 (BP 1 Location: Right arm, BP Patient Position: Sitting) 87 97.6 °F (36.4 °C) (Oral) 16 5' 5\" (1.651 m) 119 lb 3.2 oz (54.1 kg) SpO2 BMI Smoking Status 95% 19.84 kg/m2 Former Smoker BMI and BSA Data Body Mass Index Body Surface Area  
 19.84 kg/m 2 1.58 m 2 Preferred Pharmacy Pharmacy Name Phone [id number] [place] - Tati FERNANDEZ/ Irineo Adams [facility] [phone] Your Updated Medication List  
  
   
This list is accurate as of: 18  2:53 PM.  Always use your most recent med list.  
  
  
  
  
 * amphetamine-dextroamphetamine XR 30 mg XR capsule Commonly known as:  ADDERALL XR Take 1 Cap (30 mg total) by mouth daily. Max Daily Amount: 30 mg  
  
 * amphetamine-dextroamphetamine XR 30 mg XR capsule Commonly known as:  ADDERALL XR Take 1 Cap (30 mg total) by mouth dailyEarliest Fill Date: 17.   Max Daily Amount: 30 mg  
  
 * amphetamine-dextroamphetamine XR 30 mg XR capsule Commonly known as:  ADDERALL XR Take 1 Cap (30 mg total) by mouth dailyEarliest Fill Date: 8/5/17. Max Daily Amount: 30 mg  
  
 * dextroamphetamine-amphetamine 15 mg tablet Commonly known as:  ADDERALL Take 1 Tab (15 mg total) by mouth daily as needed. With evening classes/work * dextroamphetamine-amphetamine 15 mg tablet Commonly known as:  ADDERALL Take 1 Tab (15 mg total) by mouth daily as neededEarliest Fill Date: 12/31/17. With evening classes/work * amphetamine-dextroamphetamine XR 30 mg XR capsule Commonly known as:  ADDERALL XR Take 1 Cap (30 mg total) by mouth dailyEarliest Fill Date: 1/30/18. Max Daily Amount: 30 mg  
  
 * dextroamphetamine-amphetamine 15 mg tablet Commonly known as:  ADDERALL Take 1 Tab (15 mg total) by mouth daily as neededEarliest Fill Date: 1/30/18. With evening classes/work  
  
 cholecalciferol 1,000 unit tablet Commonly known as:  VITAMIN D3 Take 1 Tab by mouth daily. cyanocobalamin 500 mcg tablet Commonly known as:  B-12 DOTS Take 1 Tab by mouth daily. fexofenadine 180 mg tablet Commonly known as:  Sy Holley Take 1 Tab by mouth daily. fluticasone 50 mcg/actuation nasal spray Commonly known as:  Celestia Brisa 2 Sprays by Both Nostrils route daily. Omeprazole delayed release 20 mg tablet Commonly known as:  PRILOSEC D/R Take 1 Tab by mouth daily. * Notice: This list has 7 medication(s) that are the same as other medications prescribed for you. Read the directions carefully, and ask your doctor or other care provider to review them with you. We Performed the Following AMB POC JESSE INFLUENZA A/B TEST [62869 CPT(R)] Follow-up Instructions Return if symptoms worsen or fail to improve. Patient Instructions Sore Throat: Care Instructions Your Care Instructions Infection by bacteria or a virus causes most sore throats. Cigarette smoke, dry air, air pollution, allergies, and yelling can also cause a sore throat. Sore throats can be painful and annoying. Fortunately, most sore throats go away on their own. If you have a bacterial infection, your doctor may prescribe antibiotics. Follow-up care is a key part of your treatment and safety. Be sure to make and go to all appointments, and call your doctor if you are having problems. It's also a good idea to know your test results and keep a list of the medicines you take. How can you care for yourself at home? · If your doctor prescribed antibiotics, take them as directed. Do not stop taking them just because you feel better. You need to take the full course of antibiotics. · Gargle with warm salt water once an hour to help reduce swelling and relieve discomfort. Use 1 teaspoon of salt mixed in 1 cup of warm water. · Take an over-the-counter pain medicine, such as acetaminophen (Tylenol), ibuprofen (Advil, Motrin), or naproxen (Aleve). Read and follow all instructions on the label. · Be careful when taking over-the-counter cold or flu medicines and Tylenol at the same time. Many of these medicines have acetaminophen, which is Tylenol. Read the labels to make sure that you are not taking more than the recommended dose. Too much acetaminophen (Tylenol) can be harmful. · Drink plenty of fluids. Fluids may help soothe an irritated throat. Hot fluids, such as tea or soup, may help decrease throat pain. · Use over-the-counter throat lozenges to soothe pain. Regular cough drops or hard candy may also help. These should not be given to young children because of the risk of choking. · Do not smoke or allow others to smoke around you. If you need help quitting, talk to your doctor about stop-smoking programs and medicines. These can increase your chances of quitting for good. · Use a vaporizer or humidifier to add moisture to your bedroom. Follow the directions for cleaning the machine. When should you call for help? Call your doctor now or seek immediate medical care if: 
? · You have new or worse trouble swallowing. ? · Your sore throat gets much worse on one side. ? Watch closely for changes in your health, and be sure to contact your doctor if you do not get better as expected. Where can you learn more? Go to http://elena-poppy.info/. Enter [id number] [phone] in the search box to learn more about \"Sore Throat: Care Instructions. \" Current as of: May 12, 2017 Content Version: 11.4 © 6887-7083 [employer]. Care instructions adapted under license by [employer] (which disclaims liability or warranty for this information). If you have questions about a medical condition or this instruction, always ask your healthcare professional. [place] [address] any warranty or liability for your use of this information. Introducing [facility]! Mandy Pinzon introduces InfluAds patient portal. Now you can access parts of your medical record, email your doctor's office, and request medication refills online. 1. In your internet browser, go to [email or web address] 2. Click on the First Time User? Click Here link in the Sign In box. You will see the New Member Sign Up page. 3. Enter your InfluAds Access Code exactly as it appears below. You will not need to use this code after youve completed the sign-up process. If you do not sign up before the expiration date, you must request a new code. · [employer] Access Code: [email or web address] Expires: 5/15/2018  2:53 PM 
 
4. Enter the last four digits of your Social Security Number (xxxx) and Date of Birth (mm/dd/yyyy) as indicated and click Submit. You will be taken to the next sign-up page. 5. Create a InfluAds ID.  This will be your InfluAds login ID and cannot be changed, so think of one that is secure and easy to remember. 6. Create a Baihe password. You can change your password at any time. 7. Enter your Password Reset Question and Answer. This can be used at a later time if you forget your password. 8. Enter your e-mail address. You will receive e-mail notification when new information is available in [address] 9. Click Sign Up. You can now view and download portions of your medical record. 10. Click the Download Summary menu link to download a portable copy of your medical information. If you have questions, please visit the Frequently Asked Questions section of the Baihe website. Remember, Baihe is NOT to be used for urgent needs. For medical emergencies, dial 911. Now available from your iPhone and Android! Please provide this summary of care documentation to your next provider. Your primary care clinician is listed as [address] If you have any questions after today's visit, please call [phone].

## 2018-02-14 NOTE — PROGRESS NOTES
HISTORY OF PRESENT ILLNESS  Paloma Blue is a 20 y.o. male presents for acute visit  HPI   since earlier today. Denies fever, headache, chills, myalgia, malaise, difficulty swallowing, decrease appetite or GI symptoms    Mother had flu like symptoms    Past Medical History:   Diagnosis Date    ADD (attention deficit disorder with hyperactivity)     Allergic rhinitis     GERD (gastroesophageal reflux disease) 6/16/2014    GERD (gastroesophageal reflux disease) 6/16/2014    Leg skin lesion, right 11/3/2014    Mono exposure 2012       Current Outpatient Prescriptions on File Prior to Visit   Medication Sig Dispense Refill    amphetamine-dextroamphetamine XR (ADDERALL XR) 30 mg XR capsule Take 1 Cap (30 mg total) by mouth dailyEarliest Fill Date: 1/30/18. Max Daily Amount: 30 mg 30 Cap 0    dextroamphetamine-amphetamine (ADDERALL) 15 mg tablet Take 1 Tab (15 mg total) by mouth daily as neededEarliest Fill Date: 12/31/17. With evening classes/work 30 Tab 0    dextroamphetamine-amphetamine (ADDERALL) 15 mg tablet Take 1 Tab (15 mg total) by mouth daily as needed. With evening classes/work 30 Tab 0    fluticasone (FLONASE) 50 mcg/actuation nasal spray 2 Sprays by Both Nostrils route daily. 1 Bottle 5    fexofenadine (ALLEGRA) 180 mg tablet Take 1 Tab by mouth daily. 30 Tab 11    amphetamine-dextroamphetamine XR (ADDERALL XR) 30 mg XR capsule Take 1 Cap (30 mg total) by mouth dailyEarliest Fill Date: 8/5/17. Max Daily Amount: 30 mg 30 Cap 0    amphetamine-dextroamphetamine XR (ADDERALL XR) 30 mg XR capsule Take 1 Cap (30 mg total) by mouth dailyEarliest Fill Date: 7/5/17. Max Daily Amount: 30 mg 30 Cap 0    amphetamine-dextroamphetamine XR (ADDERALL XR) 30 mg XR capsule Take 1 Cap (30 mg total) by mouth daily. Max Daily Amount: 30 mg 30 Cap 0    Omeprazole delayed release (PRILOSEC D/R) 20 mg tablet Take 1 Tab by mouth daily.  30 Tab 5    cyanocobalamin (B-12 DOTS) 500 mcg tablet Take 1 Tab by mouth daily. 30 Tab 11    cholecalciferol (VITAMIN D3) 1,000 unit tablet Take 1 Tab by mouth daily. 30 Tab 11    dextroamphetamine-amphetamine (ADDERALL) 15 mg tablet Take 1 Tab (15 mg total) by mouth daily as neededEarliest Fill Date: 1/30/18. With evening classes/work 30 Tab 0     No current facility-administered medications on file prior to visit. Review of Systems   Constitutional: Negative. Negative for chills, diaphoresis, fever, malaise/fatigue and weight loss. HENT: Positive for sore throat. Negative for congestion and sinus pain. Respiratory: Negative. Gastrointestinal: Negative. Negative for abdominal pain, diarrhea, nausea and vomiting. Musculoskeletal: Negative for myalgias. Skin: Negative. Neurological: Negative. Negative for weakness. BP 96/70 (BP 1 Location: Right arm, BP Patient Position: Sitting)  Pulse 87  Temp 97.6 °F (36.4 °C) (Oral)   Resp 16  Ht 5' 5\" (1.651 m)  Wt 119 lb 3.2 oz (54.1 kg)  SpO2 95%  BMI 19.84 kg/m2  Physical Exam   Constitutional: He appears well-developed and well-nourished. No distress. HENT:   Right Ear: External ear normal.   Left Ear: External ear normal.   Nose: Nose normal. No mucosal edema or rhinorrhea. Right sinus exhibits no maxillary sinus tenderness and no frontal sinus tenderness. Left sinus exhibits no maxillary sinus tenderness and no frontal sinus tenderness. Mouth/Throat: No oropharyngeal exudate, posterior oropharyngeal edema, posterior oropharyngeal erythema or tonsillar abscesses. Eyes: Conjunctivae are normal. Right eye exhibits no discharge. Left eye exhibits no discharge. Neck: Normal range of motion. Neck supple. Cardiovascular: Normal rate, regular rhythm and normal heart sounds. Pulmonary/Chest: Effort normal and breath sounds normal. No respiratory distress. Lymphadenopathy:     He has no cervical adenopathy. Skin: He is not diaphoretic. ASSESSMENT and PLAN    ICD-10-CM ICD-9-CM    1.  Sore throat J02.9 462 AMB POC JESSE INFLUENZA A/B TEST     Follow-up Disposition:  Return if symptoms worsen or fail to improve. Normal exam.     Negative rapid flu    Encouraged to gargle, increase hydration, call office if symptoms increase      Patient voices understanding and acceptance of this advice and will call back if any further questions or concerns. An After Visit Summary was printed and given to the patient.

## 2018-02-14 NOTE — PATIENT INSTRUCTIONS
Sore Throat: Care Instructions  Your Care Instructions    Infection by bacteria or a virus causes most sore throats. Cigarette smoke, dry air, air pollution, allergies, and yelling can also cause a sore throat. Sore throats can be painful and annoying. Fortunately, most sore throats go away on their own. If you have a bacterial infection, your doctor may prescribe antibiotics. Follow-up care is a key part of your treatment and safety. Be sure to make and go to all appointments, and call your doctor if you are having problems. It's also a good idea to know your test results and keep a list of the medicines you take. How can you care for yourself at home? · If your doctor prescribed antibiotics, take them as directed. Do not stop taking them just because you feel better. You need to take the full course of antibiotics. · Gargle with warm salt water once an hour to help reduce swelling and relieve discomfort. Use 1 teaspoon of salt mixed in 1 cup of warm water. · Take an over-the-counter pain medicine, such as acetaminophen (Tylenol), ibuprofen (Advil, Motrin), or naproxen (Aleve). Read and follow all instructions on the label. · Be careful when taking over-the-counter cold or flu medicines and Tylenol at the same time. Many of these medicines have acetaminophen, which is Tylenol. Read the labels to make sure that you are not taking more than the recommended dose. Too much acetaminophen (Tylenol) can be harmful. · Drink plenty of fluids. Fluids may help soothe an irritated throat. Hot fluids, such as tea or soup, may help decrease throat pain. · Use over-the-counter throat lozenges to soothe pain. Regular cough drops or hard candy may also help. These should not be given to young children because of the risk of choking. · Do not smoke or allow others to smoke around you. If you need help quitting, talk to your doctor about stop-smoking programs and medicines.  These can increase your chances of quitting for good. · Use a vaporizer or humidifier to add moisture to your bedroom. Follow the directions for cleaning the machine. When should you call for help? Call your doctor now or seek immediate medical care if:  ? · You have new or worse trouble swallowing. ? · Your sore throat gets much worse on one side. ? Watch closely for changes in your health, and be sure to contact your doctor if you do not get better as expected. Where can you learn more? Go to http://elena-poppy.info/. Enter [phone] [id number] [phone] in the search box to learn more about \"Sore Throat: Care Instructions. \"  Current as of: May 12, 2017  Content Version: 11.4  © 4172-7709 [employer]. Care instructions adapted under license by [employer] (which disclaims liability or warranty for this information). If you have questions about a medical condition or this instruction, always ask your healthcare professional. [address] any warranty or liability for your use of this information.

## 2018-02-14 NOTE — PROGRESS NOTES
RM#9  Chief Complaint   Patient presents with    Flu     Results for orders placed or performed in visit on 02/14/18   AMB POC JESSE INFLUENZA A/B TEST   Result Value Ref Range    VALID INTERNAL CONTROL POC Yes     Influenza A Ag POC Negative Negative Pos/Neg    Influenza B Ag POC Negative Negative Pos/Neg       1. Have you been to the ER, urgent care clinic since your last visit? Hospitalized since your last visit? No    2. Have you seen or consulted any other health care providers outside of the [address] [place] [address] since your last visit? Include any pap smears or colon screening.  No  Health Maintenance Due   Topic Date Due    DTaP/Tdap/Td series (1 - Tdap) 02/16/2014

## 2018-02-16 PROBLEM — J02.9 SORE THROAT: Status: ACTIVE | Noted: 2018-02-16

## 2018-03-01 ENCOUNTER — OFFICE VISIT (OUTPATIENT)
Dept: INTERNAL MEDICINE CLINIC | Age: 23
End: 2018-03-01

## 2018-03-01 VITALS
SYSTOLIC BLOOD PRESSURE: 103 MMHG | TEMPERATURE: 98.2 F | DIASTOLIC BLOOD PRESSURE: 72 MMHG | RESPIRATION RATE: 16 BRPM | HEIGHT: 65 IN | HEART RATE: 101 BPM | WEIGHT: 124.8 LBS | BODY MASS INDEX: 20.79 KG/M2 | OXYGEN SATURATION: 99 %

## 2018-03-01 DIAGNOSIS — J30.9 ALLERGIC RHINITIS, UNSPECIFIED CHRONICITY, UNSPECIFIED SEASONALITY, UNSPECIFIED TRIGGER: ICD-10-CM

## 2018-03-01 DIAGNOSIS — K21.9 GASTROESOPHAGEAL REFLUX DISEASE WITHOUT ESOPHAGITIS: ICD-10-CM

## 2018-03-01 DIAGNOSIS — F98.8 ATTENTION DEFICIT DISORDER, UNSPECIFIED HYPERACTIVITY PRESENCE: Primary | ICD-10-CM

## 2018-03-01 RX ORDER — DEXTROAMPHETAMINE SACCHARATE, AMPHETAMINE ASPARTATE MONOHYDRATE, DEXTROAMPHETAMINE SULFATE AND AMPHETAMINE SULFATE 7.5; 7.5; 7.5; 7.5 MG/1; MG/1; MG/1; MG/1
30 CAPSULE, EXTENDED RELEASE ORAL DAILY
Qty: 30 CAP | Refills: 0 | Status: CANCELLED | OUTPATIENT
Start: 2018-03-01

## 2018-03-01 RX ORDER — DEXTROAMPHETAMINE SACCHARATE, AMPHETAMINE ASPARTATE, DEXTROAMPHETAMINE SULFATE AND AMPHETAMINE SULFATE 3.75; 3.75; 3.75; 3.75 MG/1; MG/1; MG/1; MG/1
15 TABLET ORAL
Qty: 30 TAB | Refills: 0 | Status: CANCELLED | OUTPATIENT
Start: 2018-03-01

## 2018-03-01 RX ORDER — DEXTROAMPHETAMINE SACCHARATE, AMPHETAMINE ASPARTATE MONOHYDRATE, DEXTROAMPHETAMINE SULFATE AND AMPHETAMINE SULFATE 7.5; 7.5; 7.5; 7.5 MG/1; MG/1; MG/1; MG/1
30 CAPSULE, EXTENDED RELEASE ORAL DAILY
Qty: 30 CAP | Refills: 0 | Status: SHIPPED | OUTPATIENT
Start: 2018-03-31 | End: 2018-04-29

## 2018-03-01 RX ORDER — DEXTROAMPHETAMINE SACCHARATE, AMPHETAMINE ASPARTATE, DEXTROAMPHETAMINE SULFATE AND AMPHETAMINE SULFATE 3.75; 3.75; 3.75; 3.75 MG/1; MG/1; MG/1; MG/1
15 TABLET ORAL DAILY
Qty: 30 TAB | Refills: 0 | Status: SHIPPED | OUTPATIENT
Start: 2018-04-30 | End: 2018-06-28

## 2018-03-01 RX ORDER — DEXTROAMPHETAMINE SACCHARATE, AMPHETAMINE ASPARTATE, DEXTROAMPHETAMINE SULFATE AND AMPHETAMINE SULFATE 3.75; 3.75; 3.75; 3.75 MG/1; MG/1; MG/1; MG/1
15 TABLET ORAL DAILY
Qty: 30 TAB | Refills: 0 | Status: SHIPPED | OUTPATIENT
Start: 2018-03-01 | End: 2018-07-06 | Stop reason: SDUPTHER

## 2018-03-01 RX ORDER — DEXTROAMPHETAMINE SACCHARATE, AMPHETAMINE ASPARTATE, DEXTROAMPHETAMINE SULFATE AND AMPHETAMINE SULFATE 3.75; 3.75; 3.75; 3.75 MG/1; MG/1; MG/1; MG/1
15 TABLET ORAL DAILY
Qty: 30 TAB | Refills: 0 | Status: SHIPPED | OUTPATIENT
Start: 2018-03-31 | End: 2018-07-06 | Stop reason: SDUPTHER

## 2018-03-01 RX ORDER — DEXTROAMPHETAMINE SACCHARATE, AMPHETAMINE ASPARTATE MONOHYDRATE, DEXTROAMPHETAMINE SULFATE AND AMPHETAMINE SULFATE 7.5; 7.5; 7.5; 7.5 MG/1; MG/1; MG/1; MG/1
30 CAPSULE, EXTENDED RELEASE ORAL DAILY
Qty: 30 CAP | Refills: 0 | Status: SHIPPED | OUTPATIENT
Start: 2018-04-30 | End: 2018-05-29

## 2018-03-01 RX ORDER — DEXTROAMPHETAMINE SACCHARATE, AMPHETAMINE ASPARTATE MONOHYDRATE, DEXTROAMPHETAMINE SULFATE AND AMPHETAMINE SULFATE 7.5; 7.5; 7.5; 7.5 MG/1; MG/1; MG/1; MG/1
30 CAPSULE, EXTENDED RELEASE ORAL DAILY
Qty: 30 CAP | Refills: 0 | Status: SHIPPED | OUTPATIENT
Start: 2018-03-01 | End: 2018-03-30

## 2018-03-01 NOTE — PROGRESS NOTES
Rm 14    Chief Complaint   Patient presents with    Medication Evaluation     ADHD     1. Have you been to the ER, urgent care clinic since your last visit? Hospitalized since your last visit? No    2. Have you seen or consulted any other health care providers outside of the [facility] since your last visit? Include any pap smears or colon screening.  No    Health Maintenance Due   Topic Date Due    DTaP/Tdap/Td series (1 - Tdap) 02/16/2014     PHQ over the last two weeks 2/14/2018   Little interest or pleasure in doing things Not at all   Feeling down, depressed or hopeless Not at all   Total Score PHQ 2 0

## 2018-03-01 NOTE — MR AVS SNAPSHOT
[address] [place] [address] [address] [place] [address] 
[phone] Patient: Erica Hallman MRN: [id number] :1995 Visit Information Date & Time Provider Department Dept. Phone Encounter #  
 3/1/2018 12:00 PM Flaco Mcneill, [address] [place] [address] and Internal Medicine [phone] [id number] Follow-up Instructions Return in about 3 months (around 2018), or if symptoms worsen or fail to improve, for ADHD. Upcoming Health Maintenance Date Due DTaP/Tdap/Td series (1 - Tdap) 2014 Allergies as of 3/1/2018  Review Complete On: 3/1/2018 By: Flaco Mcneill MD  
 No Known Allergies Current Immunizations  Reviewed on 2015 No immunizations on file. Not reviewed this visit You Were Diagnosed With   
  
 Codes Comments Attention deficit disorder, unspecified hyperactivity presence     ICD-10-CM: F98.8 ICD-9-CM: 314.00 Vitals BP Pulse Temp Resp Height(growth percentile) Weight(growth percentile) 103/72 (BP 1 Location: Left arm, BP Patient Position: Sitting) (!) 101 98.2 °F (36.8 °C) (Oral) 16 5' 5\" (1.651 m) 124 lb 12.8 oz (56.6 kg) SpO2 BMI Smoking Status 99% 20.77 kg/m2 Former Smoker Vitals History BMI and BSA Data Body Mass Index Body Surface Area 20.77 kg/m 2 1.61 m 2 Preferred Pharmacy Pharmacy Name Phone [facility]/PHARMACY #[id number] EDDI Hamm  Tati Adams [facility] [phone] Your Updated Medication List  
  
   
This list is accurate as of 3/1/18 12:44 PM.  Always use your most recent med list.  
  
  
  
  
 * amphetamine-dextroamphetamine XR 30 mg XR capsule Commonly known as:  ADDERALL XR Take 1 Cap (30 mg total) by mouth daily. Max Daily Amount: 30 mg  
  
 * amphetamine-dextroamphetamine XR 30 mg XR capsule Commonly known as:  ADDERALL XR  
 Take 1 Cap (30 mg total) by mouth dailyEarliest Fill Date: 7/5/17. Max Daily Amount: 30 mg  
  
 * amphetamine-dextroamphetamine XR 30 mg XR capsule Commonly known as:  ADDERALL XR Take 1 Cap (30 mg total) by mouth dailyEarliest Fill Date: 8/5/17. Max Daily Amount: 30 mg  
  
 * dextroamphetamine-amphetamine 15 mg tablet Commonly known as:  ADDERALL Take 1 Tab (15 mg total) by mouth daily as needed. With evening classes/work * dextroamphetamine-amphetamine 15 mg tablet Commonly known as:  ADDERALL Take 1 Tab (15 mg total) by mouth dailyEarliest Fill Date: 3/1/18. With evening classes/work Max Daily Amount: 15 mg  
  
 * amphetamine-dextroamphetamine XR 30 mg XR capsule Commonly known as:  ADDERALL XR Take 1 Cap (30 mg total) by mouth dailyEarliest Fill Date: 3/1/18. Max Daily Amount: 30 mg  
  
 * dextroamphetamine-amphetamine 15 mg tablet Commonly known as:  ADDERALL Take 1 Tab (15 mg total) by mouth dailyEarliest Fill Date: 3/31/18. With evening classes/work Max Daily Amount: 15 mg  
Start taking on:  3/31/2018 * amphetamine-dextroamphetamine XR 30 mg XR capsule Commonly known as:  ADDERALL XR Take 1 Cap (30 mg total) by mouth dailyEarliest Fill Date: 3/31/18. Max Daily Amount: 30 mg  
Start taking on:  3/31/2018 * dextroamphetamine-amphetamine 15 mg tablet Commonly known as:  ADDERALL Take 1 Tab (15 mg total) by mouth dailyEarliest Fill Date: 4/30/18. With evening classes/work Max Daily Amount: 15 mg  
Start taking on:  4/30/2018 * amphetamine-dextroamphetamine XR 30 mg XR capsule Commonly known as:  ADDERALL XR Take 1 Cap (30 mg total) by mouth dailyEarliest Fill Date: 4/30/18. Max Daily Amount: 30 mg  
Start taking on:  4/30/2018 cholecalciferol 1,000 unit tablet Commonly known as:  VITAMIN D3 Take 1 Tab by mouth daily. cyanocobalamin 500 mcg tablet Commonly known as:  B-12 DOTS Take 1 Tab by mouth daily. fexofenadine 180 mg tablet Commonly known as:  Eren Radha Take 1 Tab by mouth daily. fluticasone 50 mcg/actuation nasal spray Commonly known as:  Tu Jacinda 2 Sprays by Both Nostrils route daily. Omeprazole delayed release 20 mg tablet Commonly known as:  PRILOSEC D/R Take 1 Tab by mouth daily. * Notice: This list has 10 medication(s) that are the same as other medications prescribed for you. Read the directions carefully, and ask your doctor or other care provider to review them with you. Prescriptions Printed Refills  
 dextroamphetamine-amphetamine (ADDERALL) 15 mg tablet 0 Sig: Take 1 Tab (15 mg total) by mouth dailyEarliest Fill Date: 3/1/18. With evening classes/work Max Daily Amount: 15 mg  
 Class: Print Route: Oral  
 dextroamphetamine-amphetamine (ADDERALL) 15 mg tablet 0 Starting on: 3/31/2018 Sig: Take 1 Tab (15 mg total) by mouth dailyEarliest Fill Date: 3/31/18. With evening classes/work Max Daily Amount: 15 mg  
 Class: Print Route: Oral  
 dextroamphetamine-amphetamine (ADDERALL) 15 mg tablet 0 Starting on: 4/30/2018 Sig: Take 1 Tab (15 mg total) by mouth dailyEarliest Fill Date: 4/30/18. With evening classes/work Max Daily Amount: 15 mg  
 Class: Print Route: Oral  
 amphetamine-dextroamphetamine XR (ADDERALL XR) 30 mg XR capsule 0 Sig: Take 1 Cap (30 mg total) by mouth dailyEarliest Fill Date: 3/1/18. Max Daily Amount: 30 mg  
 Class: Print Route: Oral  
 amphetamine-dextroamphetamine XR (ADDERALL XR) 30 mg XR capsule 0 Starting on: 3/31/2018 Sig: Take 1 Cap (30 mg total) by mouth dailyEarliest Fill Date: 3/31/18. Max Daily Amount: 30 mg  
 Class: Print Route: Oral  
 amphetamine-dextroamphetamine XR (ADDERALL XR) 30 mg XR capsule 0 Starting on: 4/30/2018 Sig: Take 1 Cap (30 mg total) by mouth dailyEarliest Fill Date: 4/30/18. Max Daily Amount: 30 mg  
 Class: Print  Route: Oral  
  
 Follow-up Instructions Return in about 3 months (around 6/1/2018), or if symptoms worsen or fail to improve, for ADHD. Introducing [facility] & HEALTH SERVICES! [facility] introduces [employer] patient portal. Now you can access parts of your medical record, email your doctor's office, and request medication refills online. 1. In your internet browser, go to [email or web address] 2. Click on the First Time User? Click Here link in the Sign In box. You will see the New Member Sign Up page. 3. Enter your Im[employer] Software Access Code exactly as it appears below. You will not need to use this code after youve completed the sign-up process. If you do not sign up before the expiration date, you must request a new code. · Im[employer] Access Code: [email or web address] Expires: 5/15/2018  2:53 PM 
 
4. Enter the last four digits of your Social Security Number (xxxx) and Date of Birth (mm/dd/yyyy) as indicated and click Submit. You will be taken to the next sign-up page. 5. Create a Impossible Software ID. This will be your Impossible Software login ID and cannot be changed, so think of one that is secure and easy to remember. 6. Create a [employer] Software password. You can change your password at any time. 7. Enter your Password Reset Question and Answer. This can be used at a later time if you forget your password. 8. Enter your e-mail address. You will receive e-mail notification when new information is available in [address] 9. Click Sign Up. You can now view and download portions of your medical record. 10. Click the Download Summary menu link to download a portable copy of your medical information. If you have questions, please visit the Frequently Asked Questions section of the Impossible Software website. Remember, Im[employer] Software is NOT to be used for urgent needs. For medical emergencies, dial 911. Now available from your iPhone and Android! Please provide this summary of care documentation to your next provider. Your primary care clinician is listed as [address] If you have any questions after today's visit, please call [phone].

## 2018-07-06 ENCOUNTER — OFFICE VISIT (OUTPATIENT)
Dept: INTERNAL MEDICINE CLINIC | Age: 23
End: 2018-07-06

## 2018-07-06 VITALS
HEART RATE: 91 BPM | OXYGEN SATURATION: 97 % | WEIGHT: 117.2 LBS | TEMPERATURE: 97.9 F | DIASTOLIC BLOOD PRESSURE: 77 MMHG | RESPIRATION RATE: 18 BRPM | SYSTOLIC BLOOD PRESSURE: 109 MMHG | BODY MASS INDEX: 19.53 KG/M2 | HEIGHT: 65 IN

## 2018-07-06 DIAGNOSIS — E53.8 B12 DEFICIENCY: ICD-10-CM

## 2018-07-06 DIAGNOSIS — F98.8 ATTENTION DEFICIT DISORDER, UNSPECIFIED HYPERACTIVITY PRESENCE: Primary | ICD-10-CM

## 2018-07-06 DIAGNOSIS — J30.9 ALLERGIC RHINITIS, UNSPECIFIED SEASONALITY, UNSPECIFIED TRIGGER: ICD-10-CM

## 2018-07-06 DIAGNOSIS — E55.9 VITAMIN D DEFICIENCY: ICD-10-CM

## 2018-07-06 DIAGNOSIS — E78.5 DYSLIPIDEMIA: ICD-10-CM

## 2018-07-06 DIAGNOSIS — K21.9 GASTROESOPHAGEAL REFLUX DISEASE WITHOUT ESOPHAGITIS: ICD-10-CM

## 2018-07-06 RX ORDER — DEXTROAMPHETAMINE SACCHARATE, AMPHETAMINE ASPARTATE, DEXTROAMPHETAMINE SULFATE AND AMPHETAMINE SULFATE 3.75; 3.75; 3.75; 3.75 MG/1; MG/1; MG/1; MG/1
15 TABLET ORAL DAILY
Qty: 30 TAB | Refills: 0 | Status: SHIPPED | OUTPATIENT
Start: 2018-08-05 | End: 2018-11-01 | Stop reason: SDUPTHER

## 2018-07-06 RX ORDER — DEXTROAMPHETAMINE SACCHARATE, AMPHETAMINE ASPARTATE MONOHYDRATE, DEXTROAMPHETAMINE SULFATE AND AMPHETAMINE SULFATE 7.5; 7.5; 7.5; 7.5 MG/1; MG/1; MG/1; MG/1
30 CAPSULE, EXTENDED RELEASE ORAL DAILY
Qty: 30 CAP | Refills: 0 | Status: SHIPPED | OUTPATIENT
Start: 2018-08-05 | End: 2018-09-03

## 2018-07-06 RX ORDER — DEXTROAMPHETAMINE SACCHARATE, AMPHETAMINE ASPARTATE MONOHYDRATE, DEXTROAMPHETAMINE SULFATE AND AMPHETAMINE SULFATE 7.5; 7.5; 7.5; 7.5 MG/1; MG/1; MG/1; MG/1
30 CAPSULE, EXTENDED RELEASE ORAL DAILY
Qty: 30 CAP | Refills: 0 | Status: CANCELLED | OUTPATIENT
Start: 2018-07-06

## 2018-07-06 RX ORDER — DEXTROAMPHETAMINE SACCHARATE, AMPHETAMINE ASPARTATE MONOHYDRATE, DEXTROAMPHETAMINE SULFATE AND AMPHETAMINE SULFATE 7.5; 7.5; 7.5; 7.5 MG/1; MG/1; MG/1; MG/1
30 CAPSULE, EXTENDED RELEASE ORAL DAILY
Qty: 30 CAP | Refills: 0 | Status: SHIPPED | OUTPATIENT
Start: 2018-07-06 | End: 2018-08-04

## 2018-07-06 RX ORDER — DEXTROAMPHETAMINE SACCHARATE, AMPHETAMINE ASPARTATE MONOHYDRATE, DEXTROAMPHETAMINE SULFATE AND AMPHETAMINE SULFATE 7.5; 7.5; 7.5; 7.5 MG/1; MG/1; MG/1; MG/1
30 CAPSULE, EXTENDED RELEASE ORAL DAILY
Qty: 30 CAP | Refills: 0 | Status: SHIPPED | OUTPATIENT
Start: 2018-09-04 | End: 2018-10-03

## 2018-07-06 RX ORDER — DEXTROAMPHETAMINE SACCHARATE, AMPHETAMINE ASPARTATE, DEXTROAMPHETAMINE SULFATE AND AMPHETAMINE SULFATE 3.75; 3.75; 3.75; 3.75 MG/1; MG/1; MG/1; MG/1
15 TABLET ORAL DAILY
Qty: 30 TAB | Refills: 0 | Status: SHIPPED | OUTPATIENT
Start: 2018-09-04 | End: 2018-11-01 | Stop reason: SDUPTHER

## 2018-07-06 RX ORDER — DEXTROAMPHETAMINE SACCHARATE, AMPHETAMINE ASPARTATE, DEXTROAMPHETAMINE SULFATE AND AMPHETAMINE SULFATE 3.75; 3.75; 3.75; 3.75 MG/1; MG/1; MG/1; MG/1
15 TABLET ORAL
Qty: 30 TAB | Refills: 0 | Status: SHIPPED | OUTPATIENT
Start: 2018-07-06 | End: 2018-10-22 | Stop reason: SDUPTHER

## 2018-07-06 NOTE — MR AVS SNAPSHOT
[address] [place] [address] [place] [address] 
[phone] Patient: Debra Saini MRN: [id number] :1995 Visit Information Date & Time Provider Department Dept. Phone Encounter #  
 2018 11:45 AM Nupur Medrano, [address] [place] [address] and Internal Medicine [phone] [id number] Follow-up Instructions Return in about 3 months (around 10/6/2018), or if symptoms worsen or fail to improve, for ADHD. Upcoming Health Maintenance Date Due DTaP/Tdap/Td series (1 - Tdap) 2014 Influenza Age 5 to Adult 2018 Allergies as of 2018  Review Complete On: 2018 By: Nupur Medrano MD  
 No Known Allergies Current Immunizations  Reviewed on 2018 No immunizations on file. Reviewed by Nupur Medrano MD on 2018 at 12:22 PM  
You Were Diagnosed With   
  
 Codes Comments Attention deficit disorder, unspecified hyperactivity presence    -  Primary ICD-10-CM: F98.8 ICD-9-CM: 314.00 Allergic rhinitis, unspecified seasonality, unspecified trigger     ICD-10-CM: J30.9 ICD-9-CM: 477.9 Gastroesophageal reflux disease without esophagitis     ICD-10-CM: K21.9 ICD-9-CM: 530.81 Vitamin D deficiency     ICD-10-CM: E55.9 ICD-9-CM: 268.9 B12 deficiency     ICD-10-CM: E53.8 ICD-9-CM: 266.2 Dyslipidemia     ICD-10-CM: E78.5 ICD-9-CM: 272.4 Vitals BP Pulse Temp Resp Height(growth percentile) Weight(growth percentile) 109/77 (BP 1 Location: Left arm, BP Patient Position: Sitting) 91 97.9 °F (36.6 °C) (Oral) 18 5' 5\" (1.651 m) 117 lb 3.2 oz (53.2 kg) SpO2 BMI Smoking Status 97% 19.5 kg/m2 Former Smoker BMI and BSA Data Body Mass Index Body Surface Area 19.5 kg/m 2 1.56 m 2 Preferred Pharmacy Pharmacy Name Phone [facility]/PHARMACY #[id number] EDDI Hernandez/ Irineo Davis. [place] [phone] Your Updated Medication List  
  
   
This list is accurate as of 7/6/18 12:29 PM.  Always use your most recent med list.  
  
  
  
  
 * amphetamine-dextroamphetamine XR 30 mg XR capsule Commonly known as:  ADDERALL XR Take 1 Cap (30 mg total) by mouth daily. Max Daily Amount: 30 mg  
  
 * amphetamine-dextroamphetamine XR 30 mg XR capsule Commonly known as:  ADDERALL XR Take 1 Cap (30 mg total) by mouth dailyEarliest Fill Date: 7/5/17. Max Daily Amount: 30 mg  
  
 * amphetamine-dextroamphetamine XR 30 mg XR capsule Commonly known as:  ADDERALL XR Take 1 Cap (30 mg total) by mouth dailyEarliest Fill Date: 5/31/18. Max Daily Amount: 30 mg  
  
 * dextroamphetamine-amphetamine 15 mg tablet Commonly known as:  ADDERALL Take 1 Tab (15 mg total) by mouth daily as neededEarliest Fill Date: 7/6/18. With evening classes/work * amphetamine-dextroamphetamine XR 30 mg XR capsule Commonly known as:  ADDERALL XR Take 1 Cap (30 mg total) by mouth dailyEarliest Fill Date: 7/6/18. Max Daily Amount: 30 mg  
  
 * dextroamphetamine-amphetamine 15 mg tablet Commonly known as:  ADDERALL Take 1 Tab (15 mg total) by mouth dailyEarliest Fill Date: 8/5/18. With evening classes/work Max Daily Amount: 15 mg  
Start taking on:  8/5/2018 * amphetamine-dextroamphetamine XR 30 mg XR capsule Commonly known as:  ADDERALL XR Take 1 Cap (30 mg total) by mouth dailyEarliest Fill Date: 8/5/18. Max Daily Amount: 30 mg  
Start taking on:  8/5/2018 * dextroamphetamine-amphetamine 15 mg tablet Commonly known as:  ADDERALL Take 1 Tab (15 mg total) by mouth dailyEarliest Fill Date: 9/4/18. With evening classes/work Max Daily Amount: 15 mg  
Start taking on:  9/4/2018 * amphetamine-dextroamphetamine XR 30 mg XR capsule Commonly known as:  ADDERALL XR Take 1 Cap (30 mg total) by mouth dailyEarliest Fill Date: 9/4/18. Max Daily Amount: 30 mg  
Start taking on:  9/4/2018 cholecalciferol 1,000 unit tablet Commonly known as:  VITAMIN D3 Take 1 Tab by mouth daily. cyanocobalamin 500 mcg tablet Commonly known as:  B-12 DOTS Take 1 Tab by mouth daily. fexofenadine 180 mg tablet Commonly known as:  Morrie Mantis Take 1 Tab by mouth daily. fluticasone 50 mcg/actuation nasal spray Commonly known as:  Ha Mohler 2 Sprays by Both Nostrils route daily. Omeprazole delayed release 20 mg tablet Commonly known as:  PRILOSEC D/R Take 1 Tab by mouth daily. * Notice: This list has 9 medication(s) that are the same as other medications prescribed for you. Read the directions carefully, and ask your doctor or other care provider to review them with you. Prescriptions Printed Refills  
 dextroamphetamine-amphetamine (ADDERALL) 15 mg tablet 0 Sig: Take 1 Tab (15 mg total) by mouth daily as neededEarliest Fill Date: 7/6/18. With evening classes/work Class: Print Route: Oral  
 dextroamphetamine-amphetamine (ADDERALL) 15 mg tablet 0 Starting on: 8/5/2018 Sig: Take 1 Tab (15 mg total) by mouth dailyEarliest Fill Date: 8/5/18. With evening classes/work Max Daily Amount: 15 mg  
 Class: Print Route: Oral  
 dextroamphetamine-amphetamine (ADDERALL) 15 mg tablet 0 Starting on: 9/4/2018 Sig: Take 1 Tab (15 mg total) by mouth dailyEarliest Fill Date: 9/4/18. With evening classes/work Max Daily Amount: 15 mg  
 Class: Print Route: Oral  
 amphetamine-dextroamphetamine XR (ADDERALL XR) 30 mg XR capsule 0 Sig: Take 1 Cap (30 mg total) by mouth dailyEarliest Fill Date: 7/6/18. Max Daily Amount: 30 mg  
 Class: Print Route: Oral  
 amphetamine-dextroamphetamine XR (ADDERALL XR) 30 mg XR capsule 0 Starting on: 8/5/2018 Sig: Take 1 Cap (30 mg total) by mouth dailyEarliest Fill Date: 8/5/18. Max Daily Amount: 30 mg  
 Class: Print  Route: Oral  
 amphetamine-dextroamphetamine XR (ADDERALL XR) 30 mg XR capsule 0  
 Starting on: 9/4/2018 Sig: Take 1 Cap (30 mg total) by mouth dailyEarliest Fill Date: 9/4/18. Max Daily Amount: 30 mg  
 Class: Print Route: Oral  
  
We Performed the Following CBC WITH AUTOMATED DIFF [59134 CPT(R)] LIPID PANEL [35833 CPT(R)] METABOLIC PANEL, COMPREHENSIVE [15581 CPT(R)] VITAMIN B12 [id number] CPT(R)] VITAMIN D, 25 HYDROXY [id number] CPT(R)] Follow-up Instructions Return in about 3 months (around 10/6/2018), or if symptoms worsen or fail to improve, for ADHD. Introducing [facility] & [facility] SERVICES! [facility] introduces [email or web address] patient portal. Now you can access parts of your medical record, email your doctor's office, and request medication refills online. 1. In your internet browser, go to [email or web address] 2. Click on the First Time User? Click Here link in the Sign In box. You will see the New Member Sign Up page. 3. Enter your [email or web address] Access Code exactly as it appears below. You will not need to use this code after youve completed the sign-up process. If you do not sign up before the expiration date, you must request a new code. · [email or web address] Access Code: [email or web address] Expires: 10/4/2018 12:22 PM 
 
4. Enter the last four digits of your Social Security Number (xxxx) and Date of Birth (mm/dd/yyyy) as indicated and click Submit. You will be taken to the next sign-up page. 5. Create a Mitra Medical Technologyt ID. This will be your [email or web address] login ID and cannot be changed, so think of one that is secure and easy to remember. 6. Create a [email or web address] password. You can change your password at any time. 7. Enter your Password Reset Question and Answer. This can be used at a later time if you forget your password. 8. Enter your e-mail address. You will receive e-mail notification when new information is available in [address] 9. Click Sign Up. You can now view and download portions of your medical record. 10. Click the Download Summary menu link to download a portable copy of your medical information. If you have questions, please visit the Frequently Asked Questions section of the [employer]ee website. Remember, [employer] is NOT to be used for urgent needs. For medical emergencies, dial 911. Now available from your iPhone and Android! Please provide this summary of care documentation to your next provider. Your primary care clinician is listed as [address] If you have any questions after today's visit, please call [phone].

## 2018-07-06 NOTE — PROGRESS NOTES
HPI:  Presents for f/u ADHD    Pt exercising regularly  +appetite reports - eating well    adderall XR at 30 mg daily + prn evening dose working well    Taking allegra prn   Allergies controlled. GERD - stable even off meds. Has used PPI intermittently    Past medical, Social, and Family history reviewed    Prior to Admission medications    Medication Sig Start Date End Date Taking? Authorizing Provider   amphetamine-dextroamphetamine XR (ADDERALL XR) 30 mg XR capsule Take 1 Cap (30 mg total) by mouth dailyEarliest Fill Date: 5/31/18. Max Daily Amount: 30 mg 5/31/18  Yes Dinah Farias MD   dextroamphetamine-amphetamine (ADDERALL) 15 mg tablet Take 1 Tab (15 mg total) by mouth daily as neededEarliest Fill Date: 5/31/18. With evening classes/work 5/31/18  Yes Dinah Farias MD   fluticasone [facility]) 50 mcg/actuation nasal spray 2 Sprays by Both Nostrils route daily. 6/5/17  Yes Dinah Farias MD   fexofenadine (ALLEGRA) 180 mg tablet Take 1 Tab by mouth daily. 6/5/17  Yes Dinah Farias MD   amphetamine-dextroamphetamine XR (ADDERALL XR) 30 mg XR capsule Take 1 Cap (30 mg total) by mouth dailyEarliest Fill Date: 7/5/17. Max Daily Amount: 30 mg 7/5/17  Yes Dinah Farias MD   amphetamine-dextroamphetamine XR (ADDERALL XR) 30 mg XR capsule Take 1 Cap (30 mg total) by mouth daily. Max Daily Amount: 30 mg 6/5/17  Yes Dinah Farias MD   Omeprazole delayed release (PRILOSEC D/R) 20 mg tablet Take 1 Tab by mouth daily. 1/6/16  Yes Dinah Farias MD   cyanocobalamin (B-12 DOTS) 500 mcg tablet Take 1 Tab by mouth daily. 6/2/15  Yes Dinah Farias MD   cholecalciferol (VITAMIN D3) 1,000 unit tablet Take 1 Tab by mouth daily. 6/2/15  Yes Dinah Farias MD          ROS  Complete ROS reviewed and negative or stable except as noted in HPI. Physical Exam   Constitutional: He is oriented to person, place, and time. He appears well-nourished. No distress.    HENT:   Head: Normocephalic and atraumatic. Eyes: EOM are normal. Pupils are equal, round, and reactive to light. No scleral icterus. Neck: Normal range of motion. Neck supple. No JVD present. Cardiovascular: Normal rate, regular rhythm and normal heart sounds. Exam reveals no gallop and no friction rub. No murmur heard. Pulmonary/Chest: Effort normal and breath sounds normal. No respiratory distress. He has no wheezes. He has no rales. Abdominal: Soft. Bowel sounds are normal. He exhibits no distension. There is no tenderness. Musculoskeletal: Normal range of motion. He exhibits no edema. Lymphadenopathy:     He has no cervical adenopathy. Neurological: He is alert and oriented to person, place, and time. He exhibits normal muscle tone. Skin: Skin is warm. No rash noted. Psychiatric: He has a normal mood and affect. Nursing note and vitals reviewed. Prior labs reviewed. Assessment/Plan:    ICD-10-CM ICD-9-CM    1. Attention deficit disorder, unspecified hyperactivity presence F98.8 314.00 dextroamphetamine-amphetamine (ADDERALL) 15 mg tablet      dextroamphetamine-amphetamine (ADDERALL) 15 mg tablet      dextroamphetamine-amphetamine (ADDERALL) 15 mg tablet      amphetamine-dextroamphetamine XR (ADDERALL XR) 30 mg XR capsule      amphetamine-dextroamphetamine XR (ADDERALL XR) 30 mg XR capsule      amphetamine-dextroamphetamine XR (ADDERALL XR) 30 mg XR capsule   2. Allergic rhinitis, unspecified seasonality, unspecified trigger J30.9 477.9    3. Gastroesophageal reflux disease without esophagitis K21.9 530.81 CBC WITH AUTOMATED DIFF      METABOLIC PANEL, COMPREHENSIVE   4. Vitamin D deficiency E55.9 268.9 VITAMIN D, 25 HYDROXY   5. B12 deficiency E53.8 266.2 VITAMIN B12   6. Dyslipidemia E78.5 272.4 LIPID PANEL     Follow-up Disposition:  Return in about 3 months (around 10/6/2018), or if symptoms worsen or fail to improve, for ADHD.    results and schedule of future studies reviewed with patient  reviewed diet, exercise and weight  cardiovascular risk and specific lipid/LDL goals reviewed  reviewed medications and side effects in detail  Tdap in the next 2 years since pt recalls getting dose around 12-22 yo  Continue current medications   Pt to return for labs when better hydrated.

## 2018-07-06 NOTE — PROGRESS NOTES
Rm 15    Chief Complaint   Patient presents with    Behavioral Problem     ADHD      1. Have you been to the ER, urgent care clinic since your last visit? Hospitalized since your last visit? No    2. Have you seen or consulted any other health care providers outside of the [address] [place] [address] since your last visit? Include any pap smears or colon screening.  No

## 2018-10-01 DIAGNOSIS — F98.8 ATTENTION DEFICIT DISORDER, UNSPECIFIED HYPERACTIVITY PRESENCE: Primary | ICD-10-CM

## 2018-10-01 RX ORDER — DEXTROAMPHETAMINE SACCHARATE, AMPHETAMINE ASPARTATE MONOHYDRATE, DEXTROAMPHETAMINE SULFATE AND AMPHETAMINE SULFATE 7.5; 7.5; 7.5; 7.5 MG/1; MG/1; MG/1; MG/1
30 CAPSULE, EXTENDED RELEASE ORAL DAILY
Qty: 30 CAP | Refills: 0 | Status: SHIPPED | OUTPATIENT
Start: 2018-10-04 | End: 2020-03-27 | Stop reason: SDUPTHER

## 2018-10-01 NOTE — TELEPHONE ENCOUNTER
Medication refill request:    Last Office Visit:  July 06, 2018  Next Office Visit:  Future Appointments  Date Time Provider Nathalie Alva   10/5/2018 10:15 AM Faustina So MD [facility] [address] verified. yes    Patient requesting 30 day supply.

## 2018-10-22 DIAGNOSIS — F98.8 ATTENTION DEFICIT DISORDER, UNSPECIFIED HYPERACTIVITY PRESENCE: ICD-10-CM

## 2018-10-22 RX ORDER — DEXTROAMPHETAMINE SACCHARATE, AMPHETAMINE ASPARTATE, DEXTROAMPHETAMINE SULFATE AND AMPHETAMINE SULFATE 3.75; 3.75; 3.75; 3.75 MG/1; MG/1; MG/1; MG/1
15 TABLET ORAL
Qty: 30 TAB | Refills: 0 | Status: SHIPPED | OUTPATIENT
Start: 2018-10-22 | End: 2018-11-01 | Stop reason: SDUPTHER

## 2018-10-22 NOTE — TELEPHONE ENCOUNTER
Medication refill request:    Last Office Visit:11/1/18  Next Office Visit:    Future Appointments   Date Time Provider Nathalie Calle   11/1/2018 11:15 AM [facility] [address]         Patient requesting enough of the Adderall 15 mg.

## 2018-11-01 ENCOUNTER — OFFICE VISIT (OUTPATIENT)
Dept: INTERNAL MEDICINE CLINIC | Age: 23
End: 2018-11-01

## 2018-11-01 VITALS
WEIGHT: 123.6 LBS | OXYGEN SATURATION: 98 % | HEIGHT: 65 IN | BODY MASS INDEX: 20.59 KG/M2 | HEART RATE: 80 BPM | RESPIRATION RATE: 16 BRPM | TEMPERATURE: 98.2 F | DIASTOLIC BLOOD PRESSURE: 67 MMHG | SYSTOLIC BLOOD PRESSURE: 99 MMHG

## 2018-11-01 DIAGNOSIS — F98.8 ADD (ATTENTION DEFICIT DISORDER): ICD-10-CM

## 2018-11-01 DIAGNOSIS — K21.9 GASTROESOPHAGEAL REFLUX DISEASE WITHOUT ESOPHAGITIS: ICD-10-CM

## 2018-11-01 DIAGNOSIS — J30.9 ALLERGIC RHINITIS, UNSPECIFIED SEASONALITY, UNSPECIFIED TRIGGER: ICD-10-CM

## 2018-11-01 DIAGNOSIS — F98.8 ATTENTION DEFICIT DISORDER, UNSPECIFIED HYPERACTIVITY PRESENCE: Primary | ICD-10-CM

## 2018-11-01 RX ORDER — DEXTROAMPHETAMINE SACCHARATE, AMPHETAMINE ASPARTATE MONOHYDRATE, DEXTROAMPHETAMINE SULFATE AND AMPHETAMINE SULFATE 7.5; 7.5; 7.5; 7.5 MG/1; MG/1; MG/1; MG/1
30 CAPSULE, EXTENDED RELEASE ORAL DAILY
Qty: 30 CAP | Refills: 0 | Status: CANCELLED | OUTPATIENT
Start: 2018-11-01

## 2018-11-01 RX ORDER — DEXTROAMPHETAMINE SACCHARATE, AMPHETAMINE ASPARTATE, DEXTROAMPHETAMINE SULFATE AND AMPHETAMINE SULFATE 3.75; 3.75; 3.75; 3.75 MG/1; MG/1; MG/1; MG/1
15 TABLET ORAL DAILY
Qty: 30 TAB | Refills: 0 | Status: SHIPPED | OUTPATIENT
Start: 2018-12-01 | End: 2019-01-29 | Stop reason: SDUPTHER

## 2018-11-01 RX ORDER — DEXTROAMPHETAMINE SACCHARATE, AMPHETAMINE ASPARTATE MONOHYDRATE, DEXTROAMPHETAMINE SULFATE AND AMPHETAMINE SULFATE 7.5; 7.5; 7.5; 7.5 MG/1; MG/1; MG/1; MG/1
30 CAPSULE, EXTENDED RELEASE ORAL DAILY
Qty: 30 CAP | Refills: 0 | Status: SHIPPED | OUTPATIENT
Start: 2018-12-31 | End: 2019-01-29

## 2018-11-01 RX ORDER — DEXTROAMPHETAMINE SACCHARATE, AMPHETAMINE ASPARTATE, DEXTROAMPHETAMINE SULFATE AND AMPHETAMINE SULFATE 3.75; 3.75; 3.75; 3.75 MG/1; MG/1; MG/1; MG/1
15 TABLET ORAL
Qty: 30 TAB | Refills: 0 | Status: SHIPPED | OUTPATIENT
Start: 2018-12-31 | End: 2019-01-29 | Stop reason: SDUPTHER

## 2018-11-01 RX ORDER — DEXTROAMPHETAMINE SACCHARATE, AMPHETAMINE ASPARTATE MONOHYDRATE, DEXTROAMPHETAMINE SULFATE AND AMPHETAMINE SULFATE 7.5; 7.5; 7.5; 7.5 MG/1; MG/1; MG/1; MG/1
30 CAPSULE, EXTENDED RELEASE ORAL DAILY
Qty: 30 CAP | Refills: 0 | Status: SHIPPED | OUTPATIENT
Start: 2018-11-01 | End: 2018-11-30

## 2018-11-01 RX ORDER — DEXTROAMPHETAMINE SACCHARATE, AMPHETAMINE ASPARTATE MONOHYDRATE, DEXTROAMPHETAMINE SULFATE AND AMPHETAMINE SULFATE 7.5; 7.5; 7.5; 7.5 MG/1; MG/1; MG/1; MG/1
30 CAPSULE, EXTENDED RELEASE ORAL DAILY
Qty: 30 CAP | Refills: 0 | Status: SHIPPED | OUTPATIENT
Start: 2018-12-01 | End: 2018-12-30

## 2018-11-01 RX ORDER — DEXTROAMPHETAMINE SACCHARATE, AMPHETAMINE ASPARTATE, DEXTROAMPHETAMINE SULFATE AND AMPHETAMINE SULFATE 3.75; 3.75; 3.75; 3.75 MG/1; MG/1; MG/1; MG/1
15 TABLET ORAL DAILY
Qty: 30 TAB | Refills: 0 | Status: SHIPPED | OUTPATIENT
Start: 2018-11-01 | End: 2018-11-30

## 2018-11-01 NOTE — PROGRESS NOTES
Rm 14    Chief Complaint   Patient presents with    Medication Evaluation     ADHD     1. Have you been to the ER, urgent care clinic since your last visit? Hospitalized since your last visit? No    2. Have you seen or consulted any other health care providers outside of the [address] [place] [address] since your last visit? Include any pap smears or colon screening.  No    Health Maintenance Due   Topic Date Due    DTaP/Tdap/Td series (1 - Tdap) 02/16/2014    Influenza Age 5 to Adult  08/01/2018   pt declined flu vaccine    PHQ over the last two weeks 2/14/2018   Little interest or pleasure in doing things Not at all   Feeling down, depressed, irritable, or hopeless Not at all   Total Score PHQ 2 0     Learning Assessment 11/1/2018   PRIMARY LEARNER Patient   HIGHEST LEVEL OF EDUCATION - PRIMARY LEARNER  SOME COLLEGE   BARRIERS PRIMARY LEARNER NONE   CO-LEARNER CAREGIVER No   PRIMARY LANGUAGE ENGLISH   LEARNER PREFERENCE PRIMARY VIDEOS   ANSWERED BY patient   RELATIONSHIP SELF

## 2018-11-01 NOTE — PROGRESS NOTES
HPI:  Presents for f/u ADHD, etc    Pt finds adderall regimen effective and beneficial    +good appetite    Some GERD sx   Using TUMS and occasional prilosec  Pt reports it as manageable    Allergy meds - allegra + flonase use   Sx controlled at present. Past medical, Social, and Family history reviewed    Prior to Admission medications    Medication Sig Start Date End Date Taking? Authorizing Provider   dextroamphetamine-amphetamine (ADDERALL) 15 mg tablet Take 1 Tab (15 mg total) by mouth daily as neededEarliest Fill Date: 12/31/18. With evening classes/work 12/31/18  Yes Romayne Neve, MD   amphetamine-dextroamphetamine XR (ADDERALL XR) 30 mg XR capsule Take 1 Cap (30 mg total) by mouth dailyEarliest Fill Date: 11/1/18. Max Daily Amount: 30 mg 11/1/18 11/30/18 Yes Romayne Neve, MD   amphetamine-dextroamphetamine XR (ADDERALL XR) 30 mg XR capsule Take 1 Cap (30 mg total) by mouth dailyEarliest Fill Date: 12/1/18. Max Daily Amount: 30 mg 12/1/18 12/30/18 Yes Romayne Neve, MD   amphetamine-dextroamphetamine XR (ADDERALL XR) 30 mg XR capsule Take 1 Cap (30 mg total) by mouth dailyEarliest Fill Date: 12/31/18. Max Daily Amount: 30 mg 12/31/18 1/29/19 Yes Romayne Neve, MD   dextroamphetamine-amphetamine (ADDERALL) 15 mg tablet Take 1 Tab (15 mg total) by mouth dailyEarliest Fill Date: 12/1/18. With evening classes/work Max Daily Amount: 15 mg 12/1/18 12/30/18 Yes Romayne Neve, MD   dextroamphetamine-amphetamine (ADDERALL) 15 mg tablet Take 1 Tab (15 mg total) by mouth daily. With evening classes/work Max Daily Amount: 15 mg 11/1/18 11/30/18 Yes Romayne Neve, MD   amphetamine-dextroamphetamine XR (ADDERALL XR) 30 mg XR capsule Take 1 Cap (30 mg total) by mouth dailyEarliest Fill Date: 10/4/18.   Max Daily Amount: 30 mg 10/4/18  Yes Romayne Neve, MD   amphetamine-dextroamphetamine XR (ADDERALL XR) 30 mg XR capsule Take 1 Cap (30 mg total) by mouth dailyEarliest Fill Date: 5/31/18. Max Daily Amount: 30 mg 5/31/18  Yes Vernon Yoder MD   fluticasone [facility]) 50 mcg/actuation nasal spray 2 Sprays by Both Nostrils route daily. 6/5/17  Yes Vernon Yoder MD   fexofenadine (ALLEGRA) 180 mg tablet Take 1 Tab by mouth daily. 6/5/17  Yes Vernon Yoder MD   amphetamine-dextroamphetamine XR (ADDERALL XR) 30 mg XR capsule Take 1 Cap (30 mg total) by mouth daily. Max Daily Amount: 30 mg 6/5/17  Yes Vernon Yoder MD   Omeprazole delayed release (PRILOSEC D/R) 20 mg tablet Take 1 Tab by mouth daily. 1/6/16  Yes Vernon Yoder MD   cyanocobalamin (B-12 DOTS) 500 mcg tablet Take 1 Tab by mouth daily. 6/2/15  Yes Vernon Yoder MD   cholecalciferol (VITAMIN D3) 1,000 unit tablet Take 1 Tab by mouth daily. 6/2/15  Yes Vernon Yoder MD          ROS  Complete ROS reviewed and negative or stable except as noted in HPI. Physical Exam   Constitutional: He is oriented to person, place, and time. He appears well-nourished. No distress. HENT:   Head: Normocephalic and atraumatic. Eyes: EOM are normal. Pupils are equal, round, and reactive to light. No scleral icterus. Neck: Normal range of motion. Neck supple. No JVD present. Cardiovascular: Normal rate, regular rhythm and normal heart sounds. Exam reveals no gallop and no friction rub. No murmur heard. Pulmonary/Chest: Effort normal and breath sounds normal. No respiratory distress. He has no wheezes. He has no rales. Abdominal: Soft. Bowel sounds are normal. He exhibits no distension. There is no tenderness. Musculoskeletal: Normal range of motion. He exhibits no edema. Lymphadenopathy:     He has no cervical adenopathy. Neurological: He is alert and oriented to person, place, and time. He exhibits normal muscle tone. Skin: Skin is warm. No rash noted. Psychiatric: He has a normal mood and affect. Nursing note and vitals reviewed. Prior labs reviewed.       Assessment/Plan:    ICD-10-CM ICD-9-CM    1. Attention deficit disorder, unspecified hyperactivity presence F98.8 314.00 dextroamphetamine-amphetamine (ADDERALL) 15 mg tablet      amphetamine-dextroamphetamine XR (ADDERALL XR) 30 mg XR capsule      amphetamine-dextroamphetamine XR (ADDERALL XR) 30 mg XR capsule      amphetamine-dextroamphetamine XR (ADDERALL XR) 30 mg XR capsule      dextroamphetamine-amphetamine (ADDERALL) 15 mg tablet      dextroamphetamine-amphetamine (ADDERALL) 15 mg tablet   2. ADD (attention deficit disorder) F98.8 314.00    3. Gastroesophageal reflux disease without esophagitis K21.9 530.81    4. Allergic rhinitis, unspecified seasonality, unspecified trigger J30.9 477.9      Follow-up Disposition:  Return in about 3 months (around 2/1/2019), or if symptoms worsen or fail to improve, for ADHD.   results and schedule of future studies reviewed with patient  reviewed diet, exercise and weight    cardiovascular risk and specific lipid/LDL goals reviewed  reviewed medications and side effects in detail   Continue current medications

## 2019-01-29 ENCOUNTER — OFFICE VISIT (OUTPATIENT)
Dept: INTERNAL MEDICINE CLINIC | Age: 24
End: 2019-01-29

## 2019-01-29 VITALS
HEART RATE: 93 BPM | OXYGEN SATURATION: 97 % | BODY MASS INDEX: 20.99 KG/M2 | TEMPERATURE: 98.5 F | DIASTOLIC BLOOD PRESSURE: 71 MMHG | RESPIRATION RATE: 16 BRPM | HEIGHT: 65 IN | SYSTOLIC BLOOD PRESSURE: 104 MMHG | WEIGHT: 126 LBS

## 2019-01-29 DIAGNOSIS — K21.9 GASTROESOPHAGEAL REFLUX DISEASE WITHOUT ESOPHAGITIS: ICD-10-CM

## 2019-01-29 DIAGNOSIS — F98.8 ADD (ATTENTION DEFICIT DISORDER): ICD-10-CM

## 2019-01-29 DIAGNOSIS — F98.8 ATTENTION DEFICIT DISORDER, UNSPECIFIED HYPERACTIVITY PRESENCE: Primary | ICD-10-CM

## 2019-01-29 DIAGNOSIS — J30.9 ALLERGIC RHINITIS, UNSPECIFIED SEASONALITY, UNSPECIFIED TRIGGER: ICD-10-CM

## 2019-01-29 RX ORDER — DEXTROAMPHETAMINE SACCHARATE, AMPHETAMINE ASPARTATE MONOHYDRATE, DEXTROAMPHETAMINE SULFATE AND AMPHETAMINE SULFATE 7.5; 7.5; 7.5; 7.5 MG/1; MG/1; MG/1; MG/1
30 CAPSULE, EXTENDED RELEASE ORAL DAILY
Qty: 30 CAP | Refills: 0 | Status: CANCELLED | OUTPATIENT
Start: 2019-01-29

## 2019-01-29 RX ORDER — DEXTROAMPHETAMINE SACCHARATE, AMPHETAMINE ASPARTATE, DEXTROAMPHETAMINE SULFATE AND AMPHETAMINE SULFATE 3.75; 3.75; 3.75; 3.75 MG/1; MG/1; MG/1; MG/1
15 TABLET ORAL
Qty: 30 TAB | Refills: 0 | Status: SHIPPED | OUTPATIENT
Start: 2019-03-28 | End: 2019-04-25 | Stop reason: SDUPTHER

## 2019-01-29 RX ORDER — DEXTROAMPHETAMINE SACCHARATE, AMPHETAMINE ASPARTATE, DEXTROAMPHETAMINE SULFATE AND AMPHETAMINE SULFATE 3.75; 3.75; 3.75; 3.75 MG/1; MG/1; MG/1; MG/1
15 TABLET ORAL DAILY
Qty: 30 TAB | Refills: 0 | Status: SHIPPED | OUTPATIENT
Start: 2019-02-28 | End: 2019-03-29

## 2019-01-29 RX ORDER — DEXTROAMPHETAMINE SACCHARATE, AMPHETAMINE ASPARTATE MONOHYDRATE, DEXTROAMPHETAMINE SULFATE AND AMPHETAMINE SULFATE 7.5; 7.5; 7.5; 7.5 MG/1; MG/1; MG/1; MG/1
30 CAPSULE, EXTENDED RELEASE ORAL DAILY
Qty: 30 CAP | Refills: 0 | Status: SHIPPED | OUTPATIENT
Start: 2019-01-29 | End: 2019-02-27

## 2019-01-29 RX ORDER — DEXTROAMPHETAMINE SACCHARATE, AMPHETAMINE ASPARTATE MONOHYDRATE, DEXTROAMPHETAMINE SULFATE AND AMPHETAMINE SULFATE 7.5; 7.5; 7.5; 7.5 MG/1; MG/1; MG/1; MG/1
30 CAPSULE, EXTENDED RELEASE ORAL DAILY
Qty: 30 CAP | Refills: 0 | Status: CANCELLED | OUTPATIENT
Start: 2019-01-29 | End: 2019-02-27

## 2019-01-29 RX ORDER — DEXTROAMPHETAMINE SACCHARATE, AMPHETAMINE ASPARTATE MONOHYDRATE, DEXTROAMPHETAMINE SULFATE AND AMPHETAMINE SULFATE 7.5; 7.5; 7.5; 7.5 MG/1; MG/1; MG/1; MG/1
30 CAPSULE, EXTENDED RELEASE ORAL DAILY
Qty: 30 CAP | Refills: 0 | Status: SHIPPED | OUTPATIENT
Start: 2019-03-30 | End: 2019-04-28

## 2019-01-29 RX ORDER — DEXTROAMPHETAMINE SACCHARATE, AMPHETAMINE ASPARTATE, DEXTROAMPHETAMINE SULFATE AND AMPHETAMINE SULFATE 3.75; 3.75; 3.75; 3.75 MG/1; MG/1; MG/1; MG/1
15 TABLET ORAL
Qty: 30 TAB | Refills: 0 | Status: SHIPPED | OUTPATIENT
Start: 2019-01-29 | End: 2019-04-30 | Stop reason: SDUPTHER

## 2019-01-29 RX ORDER — DEXTROAMPHETAMINE SACCHARATE, AMPHETAMINE ASPARTATE MONOHYDRATE, DEXTROAMPHETAMINE SULFATE AND AMPHETAMINE SULFATE 7.5; 7.5; 7.5; 7.5 MG/1; MG/1; MG/1; MG/1
30 CAPSULE, EXTENDED RELEASE ORAL DAILY
Qty: 30 CAP | Refills: 0 | Status: SHIPPED | OUTPATIENT
Start: 2019-02-28 | End: 2019-03-29

## 2019-01-29 NOTE — PROGRESS NOTES
HPI: 
Presents for f/u ADHD 
 
adderall XR + IR pm dose on most weekdays 
 
+focused and productivity No adverse effects GERD stable based on diet changes Allergies - controlled with allegra + flonase Past medical, Social, and Family history reviewed Prior to Admission medications Medication Sig Start Date End Date Taking? Authorizing Provider  
dextroamphetamine-amphetamine (ADDERALL) 15 mg tablet Take 1 Tab (15 mg total) by mouth daily as neededEarliest Fill Date: 12/31/18. With evening classes/work 12/31/18  Yes Ольга Stout MD  
amphetamine-dextroamphetamine XR (ADDERALL XR) 30 mg XR capsule Take 1 Cap (30 mg total) by mouth dailyEarliest Fill Date: 12/31/18. Max Daily Amount: 30 mg 12/31/18 1/29/19 Yes Ольга Stout MD  
amphetamine-dextroamphetamine XR (ADDERALL XR) 30 mg XR capsule Take 1 Cap (30 mg total) by mouth dailyEarliest Fill Date: 10/4/18. Max Daily Amount: 30 mg 10/4/18  Yes Ольга Stout MD  
amphetamine-dextroamphetamine XR (ADDERALL XR) 30 mg XR capsule Take 1 Cap (30 mg total) by mouth dailyEarliest Fill Date: 5/31/18. Max Daily Amount: 30 mg 5/31/18  Yes Ольга Stout MD  
fluticasone [facility]) 50 mcg/actuation nasal spray 2 Sprays by Both Nostrils route daily. 6/5/17  Yes Ольга Stout MD  
fexofenadine (ALLEGRA) 180 mg tablet Take 1 Tab by mouth daily. 6/5/17  Yes Ольга Stout MD  
amphetamine-dextroamphetamine XR (ADDERALL XR) 30 mg XR capsule Take 1 Cap (30 mg total) by mouth daily. Max Daily Amount: 30 mg 6/5/17  Yes Ольга Stout MD  
Omeprazole delayed release (PRILOSEC D/R) 20 mg tablet Take 1 Tab by mouth daily. 1/6/16  Yes Ольга Stout MD  
cyanocobalamin (B-12 DOTS) 500 mcg tablet Take 1 Tab by mouth daily. 6/2/15  Yes Ольга Stout MD  
cholecalciferol (VITAMIN D3) 1,000 unit tablet Take 1 Tab by mouth daily. 6/2/15  Yes Ольга Stout MD  
  
 
 
ROS Complete ROS reviewed and negative or stable except as noted in HPI. Physical Exam  
Constitutional: He is oriented to person, place, and time. He appears well-nourished. No distress. HENT:  
Head: Normocephalic and atraumatic. Eyes: EOM are normal. Pupils are equal, round, and reactive to light. No scleral icterus. Neck: Normal range of motion. Neck supple. No JVD present. Cardiovascular: Normal rate, regular rhythm and normal heart sounds. Exam reveals no gallop and no friction rub. No murmur heard. Pulmonary/Chest: Effort normal and breath sounds normal. No respiratory distress. He has no wheezes. He has no rales. Abdominal: Soft. Bowel sounds are normal. He exhibits no distension. There is no tenderness. Musculoskeletal: Normal range of motion. He exhibits no edema. Lymphadenopathy:  
  He has no cervical adenopathy. Neurological: He is alert and oriented to person, place, and time. He exhibits normal muscle tone. Skin: Skin is warm. No rash noted. Psychiatric: He has a normal mood and affect. Nursing note and vitals reviewed. Prior labs reviewed. Assessment/Plan: ICD-10-CM ICD-9-CM 1. Attention deficit disorder, unspecified hyperactivity presence F98.8 314.00 dextroamphetamine-amphetamine (ADDERALL) 15 mg tablet  
   dextroamphetamine-amphetamine (ADDERALL) 15 mg tablet  
   dextroamphetamine-amphetamine (ADDERALL) 15 mg tablet  
   amphetamine-dextroamphetamine XR (ADDERALL XR) 30 mg XR capsule  
   amphetamine-dextroamphetamine XR (ADDERALL XR) 30 mg XR capsule  
   amphetamine-dextroamphetamine XR (ADDERALL XR) 30 mg XR capsule 2. ADD (attention deficit disorder) F98.8 314.00   
3. Gastroesophageal reflux disease without esophagitis K21.9 530.81   
4. Allergic rhinitis, unspecified seasonality, unspecified trigger J30.9 477.9 Follow-up Disposition: 
Return in about 3 months (around 4/29/2019), or if symptoms worsen or fail to improve, for ADHD. results and schedule of future studies reviewed with patient 
reviewed diet, exercise and weight  
reviewed medications and side effects in detail Pt to check on last Tdap dose Continue current medications

## 2019-01-29 NOTE — PROGRESS NOTES
Rm 13 Chief Complaint Patient presents with  Medication Evaluation ADHD 1. Have you been to the ER, urgent care clinic since your last visit? Hospitalized since your last visit? No 
 
2. Have you seen or consulted any other health care providers outside of the [address] [place] [address] since your last visit? Include any pap smears or colon screening. No 
 
Health Maintenance Due Topic Date Due  
 DTaP/Tdap/Td series (1 - Tdap) 02/16/2014 PHQ over the last two weeks 2/14/2018 Little interest or pleasure in doing things Not at all Feeling down, depressed, irritable, or hopeless Not at all Total Score PHQ 2 0 Learning Assessment 11/1/2018 PRIMARY LEARNER Patient HIGHEST LEVEL OF EDUCATION - PRIMARY LEARNER  SOME COLLEGE  
BARRIERS PRIMARY LEARNER NONE  
CO-LEARNER CAREGIVER No  
PRIMARY LANGUAGE ENGLISH  
LEARNER PREFERENCE PRIMARY VIDEOS  
ANSWERED BY patient RELATIONSHIP SELF

## 2019-04-30 ENCOUNTER — OFFICE VISIT (OUTPATIENT)
Dept: INTERNAL MEDICINE CLINIC | Age: 24
End: 2019-04-30

## 2019-04-30 VITALS
BODY MASS INDEX: 22.82 KG/M2 | HEART RATE: 87 BPM | SYSTOLIC BLOOD PRESSURE: 101 MMHG | DIASTOLIC BLOOD PRESSURE: 69 MMHG | TEMPERATURE: 98.5 F | OXYGEN SATURATION: 98 % | RESPIRATION RATE: 22 BRPM | WEIGHT: 137 LBS | HEIGHT: 65 IN

## 2019-04-30 DIAGNOSIS — E55.9 VITAMIN D DEFICIENCY: ICD-10-CM

## 2019-04-30 DIAGNOSIS — J30.2 SEASONAL ALLERGIC RHINITIS, UNSPECIFIED TRIGGER: ICD-10-CM

## 2019-04-30 DIAGNOSIS — Z00.00 WELL ADULT EXAM: ICD-10-CM

## 2019-04-30 DIAGNOSIS — E53.8 B12 DEFICIENCY: ICD-10-CM

## 2019-04-30 DIAGNOSIS — F98.8 ADD (ATTENTION DEFICIT DISORDER): ICD-10-CM

## 2019-04-30 DIAGNOSIS — F41.9 ANXIETY: ICD-10-CM

## 2019-04-30 DIAGNOSIS — K21.9 GASTROESOPHAGEAL REFLUX DISEASE WITHOUT ESOPHAGITIS: ICD-10-CM

## 2019-04-30 DIAGNOSIS — F98.8 ATTENTION DEFICIT DISORDER, UNSPECIFIED HYPERACTIVITY PRESENCE: Primary | ICD-10-CM

## 2019-04-30 RX ORDER — DEXTROAMPHETAMINE SACCHARATE, AMPHETAMINE ASPARTATE MONOHYDRATE, DEXTROAMPHETAMINE SULFATE AND AMPHETAMINE SULFATE 7.5; 7.5; 7.5; 7.5 MG/1; MG/1; MG/1; MG/1
30 CAPSULE, EXTENDED RELEASE ORAL DAILY
Qty: 30 CAP | Refills: 0 | Status: CANCELLED | OUTPATIENT
Start: 2019-04-30

## 2019-04-30 RX ORDER — DEXTROAMPHETAMINE SACCHARATE, AMPHETAMINE ASPARTATE MONOHYDRATE, DEXTROAMPHETAMINE SULFATE AND AMPHETAMINE SULFATE 7.5; 7.5; 7.5; 7.5 MG/1; MG/1; MG/1; MG/1
30 CAPSULE, EXTENDED RELEASE ORAL DAILY
Qty: 30 CAP | Refills: 0 | Status: SHIPPED | OUTPATIENT
Start: 2019-04-30 | End: 2019-05-29

## 2019-04-30 RX ORDER — DEXTROAMPHETAMINE SACCHARATE, AMPHETAMINE ASPARTATE, DEXTROAMPHETAMINE SULFATE AND AMPHETAMINE SULFATE 3.75; 3.75; 3.75; 3.75 MG/1; MG/1; MG/1; MG/1
15 TABLET ORAL
Qty: 30 TAB | Refills: 0 | Status: SHIPPED | OUTPATIENT
Start: 2019-04-30 | End: 2020-05-26 | Stop reason: SDUPTHER

## 2019-04-30 RX ORDER — DEXTROAMPHETAMINE SACCHARATE, AMPHETAMINE ASPARTATE MONOHYDRATE, DEXTROAMPHETAMINE SULFATE AND AMPHETAMINE SULFATE 7.5; 7.5; 7.5; 7.5 MG/1; MG/1; MG/1; MG/1
30 CAPSULE, EXTENDED RELEASE ORAL DAILY
Qty: 30 CAP | Refills: 0 | Status: SHIPPED | OUTPATIENT
Start: 2019-05-30 | End: 2019-06-28

## 2019-04-30 RX ORDER — DEXTROAMPHETAMINE SACCHARATE, AMPHETAMINE ASPARTATE, DEXTROAMPHETAMINE SULFATE AND AMPHETAMINE SULFATE 3.75; 3.75; 3.75; 3.75 MG/1; MG/1; MG/1; MG/1
15 TABLET ORAL
Qty: 30 TAB | Refills: 0 | Status: SHIPPED | OUTPATIENT
Start: 2019-05-30 | End: 2020-03-27 | Stop reason: SDUPTHER

## 2019-04-30 RX ORDER — DEXTROAMPHETAMINE SACCHARATE, AMPHETAMINE ASPARTATE, DEXTROAMPHETAMINE SULFATE AND AMPHETAMINE SULFATE 3.75; 3.75; 3.75; 3.75 MG/1; MG/1; MG/1; MG/1
15 TABLET ORAL
Qty: 30 TAB | Refills: 0 | Status: SHIPPED | OUTPATIENT
Start: 2019-06-29 | End: 2020-03-27 | Stop reason: SDUPTHER

## 2019-04-30 RX ORDER — ALPRAZOLAM 0.25 MG/1
0.25 TABLET ORAL
Qty: 15 TAB | Refills: 0 | Status: SHIPPED | OUTPATIENT
Start: 2019-04-30 | End: 2022-04-14 | Stop reason: SDUPTHER

## 2019-04-30 RX ORDER — DEXTROAMPHETAMINE SACCHARATE, AMPHETAMINE ASPARTATE MONOHYDRATE, DEXTROAMPHETAMINE SULFATE AND AMPHETAMINE SULFATE 7.5; 7.5; 7.5; 7.5 MG/1; MG/1; MG/1; MG/1
30 CAPSULE, EXTENDED RELEASE ORAL DAILY
Qty: 30 CAP | Refills: 0 | Status: SHIPPED | OUTPATIENT
Start: 2019-06-29 | End: 2019-07-28

## 2019-04-30 NOTE — PROGRESS NOTES
HPI: 
Presents for f/u ADHD, sleep Pt reports some difficulty with sleep Even on nights when PM adderall IR not taken. Pt acknowledges anxiety and panic at times Not daily A couple of times per month at most 
 
GERD stable Allergies controlled on meds Past medical, Social, and Family history reviewed Prior to Admission medications Medication Sig Start Date End Date Taking? Authorizing Provider  
dextroamphetamine-amphetamine (ADDERALL) 15 mg tablet Take 1 Tab by mouth daily as needed (ADHD). With evening classes/work 6/29/19  Yes Marko Arriaga MD  
dextroamphetamine-amphetamine (ADDERALL) 15 mg tablet Take 1 Tab by mouth daily as needed (attention). With evening classes/work 5/30/19  Yes Marko Arriaga MD  
amphetamine-dextroamphetamine XR (ADDERALL XR) 30 mg XR capsule Take 1 Cap by mouth daily for 29 days. Max Daily Amount: 30 mg. 4/30/19 5/29/19 Yes Marko Arriaga MD  
amphetamine-dextroamphetamine XR (ADDERALL XR) 30 mg XR capsule Take 1 Cap by mouth daily for 29 days. Max Daily Amount: 30 mg. 5/30/19 6/28/19 Yes Marko Arriaga MD  
amphetamine-dextroamphetamine XR (ADDERALL XR) 30 mg XR capsule Take 1 Cap by mouth daily for 29 days. Max Daily Amount: 30 mg. 6/29/19 7/28/19 Yes Marko Arriaga MD  
dextroamphetamine-amphetamine (ADDERALL) 15 mg tablet Take 1 Tab by mouth daily as needed (ADHD). With evening classes/work 4/30/19  Yes Marko Arriaga MD  
ALPRAZolam Frieda Councilman) 0.25 mg tablet Take 1 Tab by mouth nightly as needed for Anxiety. Max Daily Amount: 0.25 mg. 4/30/19  Yes Marko Arriaga MD  
amphetamine-dextroamphetamine XR (ADDERALL XR) 30 mg XR capsule Take 1 Cap by mouth daily. Max Daily Amount: 30 mg. 4/29/19  Yes Marko Arriaga MD  
amphetamine-dextroamphetamine XR (ADDERALL XR) 30 mg XR capsule Take 1 Cap (30 mg total) by mouth dailyEarliest Fill Date: 10/4/18.   Max Daily Amount: 30 mg 10/4/18  Yes Marko Arriaga MD  
 amphetamine-dextroamphetamine XR (ADDERALL XR) 30 mg XR capsule Take 1 Cap (30 mg total) by mouth dailyEarliest Fill Date: 5/31/18. Max Daily Amount: 30 mg 5/31/18  Yes Bety Alejo MD  
fluticasone [facility]) 50 mcg/actuation nasal spray 2 Sprays by Both Nostrils route daily. 6/5/17  Yes Bety Alejo MD  
fexofenadine (ALLEGRA) 180 mg tablet Take 1 Tab by mouth daily. 6/5/17  Yes Bety Alejo MD  
Omeprazole delayed release (PRILOSEC D/R) 20 mg tablet Take 1 Tab by mouth daily. 1/6/16  Yes Bety Alejo MD  
cyanocobalamin (B-12 DOTS) 500 mcg tablet Take 1 Tab by mouth daily. 6/2/15  Yes Bety Alejo MD  
cholecalciferol (VITAMIN D3) 1,000 unit tablet Take 1 Tab by mouth daily. 6/2/15  Yes Bety Alejo MD  
  
 
 
ROS Complete ROS reviewed and negative or stable except as noted in HPI. Physical Exam  
Constitutional: He is oriented to person, place, and time. He appears well-nourished. No distress. HENT:  
Head: Normocephalic and atraumatic. Eyes: Pupils are equal, round, and reactive to light. EOM are normal. No scleral icterus. Neck: Normal range of motion. Neck supple. No JVD present. Cardiovascular: Normal rate, regular rhythm and normal heart sounds. Exam reveals no gallop and no friction rub. No murmur heard. Pulmonary/Chest: Effort normal and breath sounds normal. No respiratory distress. He has no wheezes. He has no rales. Abdominal: Soft. Bowel sounds are normal. He exhibits no distension. There is no tenderness. Musculoskeletal: Normal range of motion. He exhibits no edema. Lymphadenopathy:  
  He has no cervical adenopathy. Neurological: He is alert and oriented to person, place, and time. He exhibits normal muscle tone. Skin: Skin is warm. No rash noted. Psychiatric: He has a normal mood and affect. Nursing note and vitals reviewed. Prior labs reviewed. Assessment/Plan: ICD-10-CM ICD-9-CM 1. Attention deficit disorder, unspecified hyperactivity presence F98.8 314.00 dextroamphetamine-amphetamine (ADDERALL) 15 mg tablet  
   dextroamphetamine-amphetamine (ADDERALL) 15 mg tablet  
   amphetamine-dextroamphetamine XR (ADDERALL XR) 30 mg XR capsule  
   dextroamphetamine-amphetamine (ADDERALL) 15 mg tablet 2. Seasonal allergic rhinitis, unspecified trigger J30.2 477.9 3. Gastroesophageal reflux disease without esophagitis K21.9 530.81   
4. ADD (attention deficit disorder) F98.8 314.00   
5. B12 deficiency E53.8 266.2 VITAMIN B12  
6. Vitamin D deficiency E55.9 268.9 VITAMIN D, 25 HYDROXY 7. Well adult exam Z00.00 V70.0 CBC WITH AUTOMATED DIFF  
   LIPID PANEL  
   METABOLIC PANEL, COMPREHENSIVE 8. Anxiety F41.9 300.00 ALPRAZolam (XANAX) 0.25 mg tablet Follow-up and Dispositions · Return in about 3 months (around 7/30/2019), or if symptoms worsen or fail to improve, for ADHD. results and schedule of future studies reviewed with patient 
reviewed diet, exercise and weight  
reviewed medications and side effects in detail Offered SSRI Pt declines Tdap Agreed to limited xanax - if frequent use, then will need to readdress management

## 2019-04-30 NOTE — PROGRESS NOTES
Rm#14 Chief Complaint Patient presents with  Behavioral Problem  
  adhd med f/u 1. Have you been to the ER, urgent care clinic since your last visit? Hospitalized since your last visit? No 
 
2. Have you seen or consulted any other health care providers outside of the [address] [place] [address] since your last visit? Include any pap smears or colon screening. No 
Health Maintenance Due Topic Date Due  
 DTaP/Tdap/Td series (1 - Tdap) 02/16/2014  
 
3 most recent PHQ Screens 4/30/2019 Little interest or pleasure in doing things Not at all Feeling down, depressed, irritable, or hopeless Not at all Total Score PHQ 2 0

## 2019-08-21 DIAGNOSIS — F98.8 ATTENTION DEFICIT DISORDER, UNSPECIFIED HYPERACTIVITY PRESENCE: ICD-10-CM

## 2019-08-21 NOTE — TELEPHONE ENCOUNTER
Pt will be out a couple of days prior to appt w/ NP 8/28/19. (Pt last seen 4/30/19 by Dr Mala Byrd). Can pt get refill prior to appt?  Please call pt - ph#: [phone]

## 2019-08-22 RX ORDER — DEXTROAMPHETAMINE SACCHARATE, AMPHETAMINE ASPARTATE MONOHYDRATE, DEXTROAMPHETAMINE SULFATE AND AMPHETAMINE SULFATE 7.5; 7.5; 7.5; 7.5 MG/1; MG/1; MG/1; MG/1
30 CAPSULE, EXTENDED RELEASE ORAL DAILY
Qty: 30 CAP | Refills: 0 | Status: SHIPPED | OUTPATIENT
Start: 2019-08-22 | End: 2020-05-26 | Stop reason: SDUPTHER

## 2019-08-28 ENCOUNTER — OFFICE VISIT (OUTPATIENT)
Dept: INTERNAL MEDICINE CLINIC | Age: 24
End: 2019-08-28

## 2019-08-28 VITALS
HEART RATE: 104 BPM | DIASTOLIC BLOOD PRESSURE: 82 MMHG | WEIGHT: 121 LBS | RESPIRATION RATE: 16 BRPM | SYSTOLIC BLOOD PRESSURE: 119 MMHG | OXYGEN SATURATION: 98 % | BODY MASS INDEX: 20.16 KG/M2 | TEMPERATURE: 98.5 F | HEIGHT: 65 IN

## 2019-08-28 DIAGNOSIS — F98.8 ATTENTION DEFICIT DISORDER, UNSPECIFIED HYPERACTIVITY PRESENCE: ICD-10-CM

## 2019-08-28 DIAGNOSIS — F41.9 ANXIETY: ICD-10-CM

## 2019-08-28 DIAGNOSIS — R63.4 WEIGHT LOSS, NON-INTENTIONAL: Primary | ICD-10-CM

## 2019-08-28 RX ORDER — DEXTROAMPHETAMINE SACCHARATE, AMPHETAMINE ASPARTATE, DEXTROAMPHETAMINE SULFATE AND AMPHETAMINE SULFATE 3.75; 3.75; 3.75; 3.75 MG/1; MG/1; MG/1; MG/1
15 TABLET ORAL
Qty: 30 TAB | Refills: 0 | Status: CANCELLED | OUTPATIENT
Start: 2019-08-28

## 2019-08-28 RX ORDER — ALPRAZOLAM 0.25 MG/1
0.25 TABLET ORAL
Qty: 15 TAB | Refills: 0 | Status: CANCELLED | OUTPATIENT
Start: 2019-08-28

## 2019-08-28 RX ORDER — DEXTROAMPHETAMINE SACCHARATE, AMPHETAMINE ASPARTATE MONOHYDRATE, DEXTROAMPHETAMINE SULFATE AND AMPHETAMINE SULFATE 7.5; 7.5; 7.5; 7.5 MG/1; MG/1; MG/1; MG/1
30 CAPSULE, EXTENDED RELEASE ORAL DAILY
Qty: 30 CAP | Refills: 0 | Status: CANCELLED | OUTPATIENT
Start: 2019-08-28

## 2019-08-28 NOTE — PROGRESS NOTES
Rm 9    Chief Complaint   Patient presents with    Medication Refill     ADHD    Burn     right side, inside forearm/wrist area, happened Saturday per pt     1. Have you been to the ER, urgent care clinic since your last visit? Hospitalized since your last visit? No    2. Have you seen or consulted any other health care providers outside of the [address] [place] [address] since your last visit? Include any pap smears or colon screening.  No    Health Maintenance Due   Topic Date Due    DTaP/Tdap/Td series (1 - Tdap) 02/16/2014    Influenza Age 5 to Adult  08/01/2019     3 most recent PHQ Screens 8/28/2019   Little interest or pleasure in doing things Not at all   Feeling down, depressed, irritable, or hopeless Not at all   Total Score PHQ 2 0     Learning Assessment 11/1/2018   PRIMARY LEARNER Patient   HIGHEST LEVEL OF EDUCATION - PRIMARY LEARNER  SOME COLLEGE   BARRIERS PRIMARY LEARNER NONE   CO-LEARNER CAREGIVER No   PRIMARY LANGUAGE ENGLISH   LEARNER PREFERENCE PRIMARY VIDEOS   ANSWERED BY patient   RELATIONSHIP SELF

## 2019-08-28 NOTE — PATIENT INSTRUCTIONS
Attention Deficit Hyperactivity Disorder (ADHD) in Adults: Care Instructions  Your Care Instructions    Attention deficit hyperactivity disorder, or ADHD, is a condition that makes it hard to pay attention. So you may have problems when you try to focus, get organized, and finish tasks. It might make you more active than other people. Or you might do things without thinking first.  ADHD is very common. It usually starts in early childhood. Many adults don't realize they have it until their children are diagnosed. Then they become aware of their own symptoms. Doctors don't know what causes ADHD. But it often runs in families. ADHD can be treated with medicines, behavior training, and counseling. Treatment can improve your life. Follow-up care is a key part of your treatment and safety. Be sure to make and go to all appointments, and call your doctor if you are having problems. It's also a good idea to know your test results and keep a list of the medicines you take. How can you care for yourself at home? · Learn all you can about ADHD. This will help you and your family understand it better. · Take your medicines exactly as prescribed. Call your doctor if you think you are having a problem with your medicine. You will get more details on the specific medicines your doctor prescribes. · If you miss a dose of your medicine, do not take an extra dose. · If your doctor suggests counseling, find a counselor you like and trust. Talk openly and honestly. Be willing to make some changes. · Find a support group for adults with ADHD. Talking to others with the same problems can help you feel better. It can also give you ideas about how to best cope with the condition. · Get rid of distractions at your work space. Keep your desk clean. Try not to face a window or busy hallway. · Use files, planners, and other tools to keep you organized. · Limit use of alcohol, and do not use illegal drugs.  People with ADHD tend to develop substance use disorder more easily than others. Tell your doctor if you need help to quit. Counseling, support groups, and sometimes medicines can help you stay free of alcohol or drugs. · Get at least 30 minutes of physical activity on most days of the week. Exercise has been shown to help people cope with ADHD. Walking is a good choice. You also may want to do other activities, such as running, swimming, cycling, or playing tennis or team sports. When should you call for help? Watch closely for changes in your health, and be sure to contact your doctor if:    · You feel sad a lot or cry all the time.     · You have trouble sleeping, or you sleep too much.     · You find it hard to concentrate, make decisions, or remember things.     · You change how you normally eat.     · You feel guilty for no reason. Where can you learn more? Go to http://elena-poppy.info/. Enter B196 in the search box to learn more about \"Attention Deficit Hyperactivity Disorder (ADHD) in Adults: Care Instructions. \"  Current as of: September 11, 2018  Content Version: 12.1  © 4066-8696 [employer]. Care instructions adapted under license by [employer] (which disclaims liability or warranty for this information). If you have questions about a medical condition or this instruction, always ask your healthcare professional. [address] any warranty or liability for your use of this information.

## 2019-08-28 NOTE — PROGRESS NOTES
HISTORY OF PRESENT ILLNESS  Geneva Vega is a 30 y.o. male presents for medication refill   HPI  He lost weight 2/2 recent food poisoning  and decreased appetite     Unable to provide urine specimen for drug screening    He questions why he needs drug screening    He wants to reschedule appointment because he needs to leave    Adderral XR refilled 8/22 for 30 days, still has some pills     Past Medical History:   Diagnosis Date    ADD (attention deficit disorder with hyperactivity)     Allergic rhinitis     GERD (gastroesophageal reflux disease) 6/16/2014    GERD (gastroesophageal reflux disease) 6/16/2014    Leg skin lesion, right 11/3/2014    Mono exposure 2012         Current Outpatient Medications   Medication Sig    amphetamine-dextroamphetamine XR (ADDERALL XR) 30 mg XR capsule Take 1 Cap by mouth daily. Max Daily Amount: 30 mg.    dextroamphetamine-amphetamine (ADDERALL) 15 mg tablet Take 1 Tab by mouth daily as needed (ADHD). With evening classes/work    dextroamphetamine-amphetamine (ADDERALL) 15 mg tablet Take 1 Tab by mouth daily as needed (attention). With evening classes/work    dextroamphetamine-amphetamine (ADDERALL) 15 mg tablet Take 1 Tab by mouth daily as needed (ADHD). With evening classes/work    ALPRAZolam (XANAX) 0.25 mg tablet Take 1 Tab by mouth nightly as needed for Anxiety. Max Daily Amount: 0.25 mg.    amphetamine-dextroamphetamine XR (ADDERALL XR) 30 mg XR capsule Take 1 Cap by mouth daily. Max Daily Amount: 30 mg.    amphetamine-dextroamphetamine XR (ADDERALL XR) 30 mg XR capsule Take 1 Cap (30 mg total) by mouth dailyEarliest Fill Date: 10/4/18. Max Daily Amount: 30 mg    fluticasone (FLONASE) 50 mcg/actuation nasal spray 2 Sprays by Both Nostrils route daily.  fexofenadine (ALLEGRA) 180 mg tablet Take 1 Tab by mouth daily.  Omeprazole delayed release (PRILOSEC D/R) 20 mg tablet Take 1 Tab by mouth daily.     cyanocobalamin (B-12 DOTS) 500 mcg tablet Take 1 Tab by mouth daily.  cholecalciferol (VITAMIN D3) 1,000 unit tablet Take 1 Tab by mouth daily. No current facility-administered medications for this visit. No Known Allergies  Review of Systems   Constitutional: Positive for weight loss (nonintentional ). Respiratory: Negative. Cardiovascular: Negative. Neurological: Negative. Physical Exam   Constitutional:   16 lb weight loss since April 30, 3019   Psychiatric: His speech is normal and behavior is normal. His mood appears anxious. Cognition and memory are normal.       ASSESSMENT and PLAN    ICD-10-CM ICD-9-CM    1. Attention deficit disorder, unspecified hyperactivity presence F98.8 314.00    2.  Anxiety F41.9 300.00           query reveals no unreported prescribing   Defer medication refill until drug screening   completed, physical exam and counseling       lab results and schedule of future lab studies reviewed with patient  reviewed medications and side effects in detail

## 2019-08-29 PROBLEM — F41.9 ANXIETY: Status: ACTIVE | Noted: 2019-08-29

## 2019-08-29 PROBLEM — R63.4 WEIGHT LOSS, NON-INTENTIONAL: Status: ACTIVE | Noted: 2019-08-29

## 2020-03-27 ENCOUNTER — VIRTUAL VISIT (OUTPATIENT)
Dept: INTERNAL MEDICINE CLINIC | Age: 25
End: 2020-03-27

## 2020-03-27 DIAGNOSIS — F98.8 ATTENTION DEFICIT DISORDER, UNSPECIFIED HYPERACTIVITY PRESENCE: Primary | ICD-10-CM

## 2020-03-27 DIAGNOSIS — E53.8 B12 DEFICIENCY: ICD-10-CM

## 2020-03-27 DIAGNOSIS — E55.9 VITAMIN D DEFICIENCY: ICD-10-CM

## 2020-03-27 DIAGNOSIS — F98.8 ATTENTION DEFICIT DISORDER, UNSPECIFIED HYPERACTIVITY PRESENCE: ICD-10-CM

## 2020-03-27 RX ORDER — DEXTROAMPHETAMINE SACCHARATE, AMPHETAMINE ASPARTATE, DEXTROAMPHETAMINE SULFATE AND AMPHETAMINE SULFATE 3.75; 3.75; 3.75; 3.75 MG/1; MG/1; MG/1; MG/1
15 TABLET ORAL
Qty: 30 TAB | Refills: 0 | Status: SHIPPED | OUTPATIENT
Start: 2020-03-27 | End: 2020-05-28 | Stop reason: SDUPTHER

## 2020-03-27 RX ORDER — DEXTROAMPHETAMINE SACCHARATE, AMPHETAMINE ASPARTATE, DEXTROAMPHETAMINE SULFATE AND AMPHETAMINE SULFATE 3.75; 3.75; 3.75; 3.75 MG/1; MG/1; MG/1; MG/1
15 TABLET ORAL
Qty: 30 TAB | Refills: 0 | Status: SHIPPED | OUTPATIENT
Start: 2020-04-26 | End: 2020-05-28 | Stop reason: SDUPTHER

## 2020-03-27 RX ORDER — DEXTROAMPHETAMINE SACCHARATE, AMPHETAMINE ASPARTATE MONOHYDRATE, DEXTROAMPHETAMINE SULFATE AND AMPHETAMINE SULFATE 7.5; 7.5; 7.5; 7.5 MG/1; MG/1; MG/1; MG/1
30 CAPSULE, EXTENDED RELEASE ORAL DAILY
Qty: 30 CAP | Refills: 0 | Status: CANCELLED | OUTPATIENT
Start: 2020-03-27

## 2020-03-27 RX ORDER — DEXTROAMPHETAMINE SACCHARATE, AMPHETAMINE ASPARTATE MONOHYDRATE, DEXTROAMPHETAMINE SULFATE AND AMPHETAMINE SULFATE 7.5; 7.5; 7.5; 7.5 MG/1; MG/1; MG/1; MG/1
30 CAPSULE, EXTENDED RELEASE ORAL DAILY
Qty: 30 CAP | Refills: 0 | Status: SHIPPED | OUTPATIENT
Start: 2020-03-27 | End: 2020-05-28 | Stop reason: SDUPTHER

## 2020-03-27 RX ORDER — DEXTROAMPHETAMINE SACCHARATE, AMPHETAMINE ASPARTATE, DEXTROAMPHETAMINE SULFATE AND AMPHETAMINE SULFATE 3.75; 3.75; 3.75; 3.75 MG/1; MG/1; MG/1; MG/1
15 TABLET ORAL
Qty: 30 TAB | Refills: 0 | Status: CANCELLED | OUTPATIENT
Start: 2020-03-27

## 2020-03-27 RX ORDER — DEXTROAMPHETAMINE SACCHARATE, AMPHETAMINE ASPARTATE MONOHYDRATE, DEXTROAMPHETAMINE SULFATE AND AMPHETAMINE SULFATE 7.5; 7.5; 7.5; 7.5 MG/1; MG/1; MG/1; MG/1
30 CAPSULE, EXTENDED RELEASE ORAL DAILY
Qty: 30 CAP | Refills: 0 | Status: SHIPPED | OUTPATIENT
Start: 2020-04-26 | End: 2020-05-28 | Stop reason: SDUPTHER

## 2020-03-27 NOTE — PROGRESS NOTES
Luis Cornell is a 30 y.o. male who was seen by synchronous (real-time) audio-video technology on 3/27/2020. Consent:  He and/or his healthcare decision maker is aware that this patient-initiated Telehealth encounter is a billable service, with coverage as determined by his insurance carrier. He is aware that he may receive a bill and has provided verbal consent to proceed: Yes    I was in the office while conducting this encounter. Subjective:   Luis Cornell was seen for Follow-up (ADHD refills)    Chief Complaint   Patient presents with    Follow-up     ADHD refills     He notes no problems with current medications. He has done well with dosing medications as below. Notes no problems with side effects, or appetite on medications. Refills reviewed as below at visit. Prior to Admission medications    Medication Sig Start Date End Date Taking? Authorizing Provider   amphetamine-dextroamphetamine XR (ADDERALL XR) 30 mg XR capsule Take 1 Cap by mouth daily. Max Daily Amount: 30 mg. 8/22/19  Yes Simin Mcneal NP   dextroamphetamine-amphetamine (ADDERALL) 15 mg tablet Take 1 Tab by mouth daily as needed (ADHD). With evening classes/work 6/29/19  Yes Rosa Maria Petersen MD   dextroamphetamine-amphetamine (ADDERALL) 15 mg tablet Take 1 Tab by mouth daily as needed (attention). With evening classes/work 5/30/19  Yes Rosa Maria Petersen MD   dextroamphetamine-amphetamine (ADDERALL) 15 mg tablet Take 1 Tab by mouth daily as needed (ADHD). With evening classes/work 4/30/19  Yes Rosa Maria Petersen MD   ALPRAZolam Timothy Brisk) 0.25 mg tablet Take 1 Tab by mouth nightly as needed for Anxiety. Max Daily Amount: 0.25 mg. 4/30/19  Yes Rosa Maria Petersen MD   amphetamine-dextroamphetamine XR (ADDERALL XR) 30 mg XR capsule Take 1 Cap by mouth daily.  Max Daily Amount: 30 mg. 4/29/19  Yes Rosa Maria Petersen MD   amphetamine-dextroamphetamine XR (ADDERALL XR) 30 mg XR capsule Take 1 Cap (30 mg total) by mouth dailyEarliest Fill Date: 10/4/18. Max Daily Amount: 30 mg 10/4/18  Yes Stevan Smith MD   fluticasone [facility]) 50 mcg/actuation nasal spray 2 Sprays by Both Nostrils route daily. 6/5/17  Yes Stevan Smith MD   fexofenadine (ALLEGRA) 180 mg tablet Take 1 Tab by mouth daily. 6/5/17  Yes Stevan Smith MD   Omeprazole delayed release (PRILOSEC D/R) 20 mg tablet Take 1 Tab by mouth daily. 1/6/16  Yes Stevan Smith MD   cyanocobalamin (B-12 DOTS) 500 mcg tablet Take 1 Tab by mouth daily. 6/2/15  Juventino Smith MD   cholecalciferol (VITAMIN D3) 1,000 unit tablet Take 1 Tab by mouth daily. 6/2/15  Juventino Smith MD     No Known Allergies    Past Medical History:   Diagnosis Date    ADD (attention deficit disorder with hyperactivity)     Allergic rhinitis     GERD (gastroesophageal reflux disease) 6/16/2014    GERD (gastroesophageal reflux disease) 6/16/2014    Leg skin lesion, right 11/3/2014    Mono exposure 2012         Prescription Monitoring Program ([place]) database query with fills:  08/05/2019  1   04/30/2019  Dextroamp-Amphetamin 15 MG Tab  30.00 30 Ra Mor   [id number]   Vir (0480)   [place]   [facility] [place]   [facility]   07[place]2019  1   04/30/2019  Dextroamp-Amphetamin 15 MG Tab  30.00 30 Ra Mor   [id number]   Vir (0480)   [place]   [facility]   06/11/2019  1   04/26/2019  Dextroamp-Amphetamin 15 MG Tab  30.00 30 Ra Mor   [id number]   Vir (0480)   0   [facility]   05/12/2019  1   04/30/2019  Dextroamp-Amphetamin 15 MG Tab  30.00 30 Ra Mor   [id number]   Vir (0480)   0   [facility]         [facility]      PHYSICAL EXAMINATION:    Vital Signs: None--virtual visit.     Constitutional: [x] Appears well-developed and well-nourished [x] No apparent distress      Mental status: [x] Alert and awake  [x] Oriented [x] Able to follow commands       Eyes:   EOM    [x]  Normal      Sclera  [x]  Normal              Discharge [x]  None visible       HENT: [x] Normocephalic, atraumatic    [x] Mouth/Throat: Mucous membranes are moist    External Ears [x] Normal      Neck: [x] No visualized mass     Pulmonary/Chest: [x] Respiratory effort normal   [x] No visualized signs of difficulty breathing or respiratory distress     Musculoskeletal:  [x] Normal range of motion of neck    Neurological:        [x] No Facial Asymmetry (Cranial nerve 7 motor function) (limited exam due to video visit)          [x] No gaze palsy     Skin:        [x] No significant exanthematous lesions or discoloration noted on facial skin             Psychiatric:       [x] Normal Affect       Other pertinent observable physical exam findings:  None. Assessment/Plan:    ICD-10-CM ICD-9-CM    1. Attention deficit disorder, unspecified hyperactivity presence F98.8 314.00 dextroamphetamine-amphetamine (ADDERALL) 15 mg tablet      amphetamine-dextroamphetamine XR (ADDERALL XR) 30 mg XR capsule      dextroamphetamine-amphetamine (ADDERALL) 15 mg tablet      amphetamine-dextroamphetamine XR (ADDERALL XR) 30 mg XR capsule   2. B12 deficiency E53.8 266.2    3. Vitamin D deficiency E55.9 268.9        1. He will call for follow-up in 2mo.    2,3:  Prefers to do Vit D and B12 monitoring with follow-up--not [facility] in interim. 2018 labs not done. Follow-up and Dispositions    · Return in about 2 months (around 5/27/2020) for non-fasting labs, medication follow-up.       reviewed medications and side effects in detail    Plan and evaluation (above) reviewed with pt at visit  Patient voiced understanding of plan and provided with time to ask/review questions. After Visit Summary (AVS) provided to pt after visit with additional instructions as needed/reviewed--AVS printed to mail to pt after visit. We discussed the expected course, resolution and complications of the diagnosis(es) in detail. Medication risks, benefits, costs, interactions, and alternatives were discussed as indicated.   I advised him to contact the office if his condition worsens, changes or fails to improve as anticipated. He expressed understanding with the diagnosis(es) and plan. Pursuant to the emergency declaration under the [facility], [facility] waiver authority and the [employer] Act, this Virtual  Visit was conducted, with patient's consent, to reduce the patient's risk of exposure to COVID-19 and provide continuity of care for an established patient. Services were provided through a video synchronous discussion virtually to substitute for in-person clinic visit.     Kirt Gutierrez MD

## 2020-03-27 NOTE — PROGRESS NOTES
Follow up medication refill - ADHD medication - adderall XR 30 and adderall 15mg    Chief Complaint   Patient presents with    Follow-up     ADHD medicaitonn refill      1. Have you been to the ER, urgent care clinic since your last visit? Hospitalized since your last visit? No    2. Have you seen or consulted any other health care providers outside of the [address] [place] [address] since your last visit? Include any pap smears or colon screening. No    Health Maintenance Due   Topic Date Due    DTaP/Tdap/Td series (1 - Tdap) 02/16/2014    Influenza Age 5 to Adult  08/01/2019       Learning Assessment 11/1/2018   PRIMARY LEARNER Patient   HIGHEST LEVEL OF EDUCATION - PRIMARY LEARNER  SOME COLLEGE   BARRIERS PRIMARY LEARNER NONE   CO-LEARNER CAREGIVER No   PRIMARY LANGUAGE ENGLISH   LEARNER PREFERENCE PRIMARY VIDEOS   ANSWERED BY patient   RELATIONSHIP SELF         AVS  education, follow up, and recommendations provided and addressed with patient.  services used to advise patient no .

## 2020-03-27 NOTE — PROGRESS NOTES
Chief Complaint   Patient presents with    Follow-up     ADHD refills     1. Have you been to the ER, urgent care clinic since your last visit? Hospitalized since your last visit? No    2. Have you seen or consulted any other health care providers outside of the [address] [place] [address] since your last visit? Include any pap smears or colon screening. No    Health Maintenance Due   Topic Date Due    DTaP/Tdap/Td series (1 - Tdap) 02/16/2014    Influenza Age 5 to Adult  08/01/2019       Learning Assessment 11/1/2018   PRIMARY LEARNER Patient   HIGHEST LEVEL OF EDUCATION - PRIMARY LEARNER  SOME COLLEGE   BARRIERS PRIMARY LEARNER NONE   CO-LEARNER CAREGIVER No   PRIMARY LANGUAGE ENGLISH   LEARNER PREFERENCE PRIMARY VIDEOS   ANSWERED BY patient   RELATIONSHIP SELF         AVS  education, follow up, and recommendations provided and addressed with patient.  services used to advise patient no   .

## 2020-05-28 ENCOUNTER — VIRTUAL VISIT (OUTPATIENT)
Dept: INTERNAL MEDICINE CLINIC | Age: 25
End: 2020-05-28

## 2020-05-28 DIAGNOSIS — F98.8 ATTENTION DEFICIT DISORDER, UNSPECIFIED HYPERACTIVITY PRESENCE: ICD-10-CM

## 2020-05-28 RX ORDER — DEXTROAMPHETAMINE SACCHARATE, AMPHETAMINE ASPARTATE MONOHYDRATE, DEXTROAMPHETAMINE SULFATE AND AMPHETAMINE SULFATE 7.5; 7.5; 7.5; 7.5 MG/1; MG/1; MG/1; MG/1
30 CAPSULE, EXTENDED RELEASE ORAL DAILY
Qty: 30 CAP | Refills: 0 | Status: SHIPPED | OUTPATIENT
Start: 2020-07-26 | End: 2020-08-26 | Stop reason: SDUPTHER

## 2020-05-28 RX ORDER — DEXTROAMPHETAMINE SACCHARATE, AMPHETAMINE ASPARTATE, DEXTROAMPHETAMINE SULFATE AND AMPHETAMINE SULFATE 3.75; 3.75; 3.75; 3.75 MG/1; MG/1; MG/1; MG/1
15 TABLET ORAL
Qty: 30 TAB | Refills: 0 | Status: SHIPPED | OUTPATIENT
Start: 2020-07-26 | End: 2020-08-26 | Stop reason: SDUPTHER

## 2020-05-28 RX ORDER — DEXTROAMPHETAMINE SACCHARATE, AMPHETAMINE ASPARTATE MONOHYDRATE, DEXTROAMPHETAMINE SULFATE AND AMPHETAMINE SULFATE 7.5; 7.5; 7.5; 7.5 MG/1; MG/1; MG/1; MG/1
30 CAPSULE, EXTENDED RELEASE ORAL DAILY
Qty: 30 CAP | Refills: 0 | Status: SHIPPED | OUTPATIENT
Start: 2020-06-26 | End: 2020-08-26 | Stop reason: SDUPTHER

## 2020-05-28 RX ORDER — DEXTROAMPHETAMINE SACCHARATE, AMPHETAMINE ASPARTATE, DEXTROAMPHETAMINE SULFATE AND AMPHETAMINE SULFATE 3.75; 3.75; 3.75; 3.75 MG/1; MG/1; MG/1; MG/1
15 TABLET ORAL
Qty: 30 TAB | Refills: 0 | Status: SHIPPED | OUTPATIENT
Start: 2020-06-26 | End: 2020-08-26 | Stop reason: SDUPTHER

## 2020-05-28 NOTE — PROGRESS NOTES
Virtual Visit/Telephone Call with patient on doxy. Chief Complaint   Patient presents with    Medication Refill     f/u    Behavioral Problem     adhd f/u       1. Have you been to the ER, urgent care clinic since your last visit? Hospitalized since your last visit? No    2. Have you seen or consulted any other health care providers outside of the [address] [place] [address] since your last visit? Include any pap smears or colon screening. No    Health Maintenance Due   Topic Date Due    DTaP/Tdap/Td series (1 - Tdap) 02/16/2014       3 most recent PHQ Screens 5/28/2020   Little interest or pleasure in doing things Not at all   Feeling down, depressed, irritable, or hopeless Not at all   Total Score PHQ 2 0       Learning Assessment 11/1/2018   PRIMARY LEARNER Patient   HIGHEST LEVEL OF EDUCATION - PRIMARY LEARNER  SOME COLLEGE   BARRIERS PRIMARY LEARNER NONE   CO-LEARNER CAREGIVER No   PRIMARY LANGUAGE ENGLISH   LEARNER PREFERENCE PRIMARY VIDEOS   ANSWERED BY patient   RELATIONSHIP SELF     Recent Travel Screening and Travel History documentation     Travel Screening       Question Response     In the last month, have you been in contact with someone who was confirmed or suspected to have Coronavirus / COVID-19? No / Unsure     Do you have any of the following symptoms? None of these     Have you traveled internationally in the last month? No      Travel History   Travel since 04/28/20     No documented travel since 04/28/20                  AVS mailed to patients home.

## 2020-05-28 NOTE — PROGRESS NOTES
Chrissie Espinosa is a 30 y.o. male who was seen by synchronous (real-time) audio-video technology on 5/28/2020. Consent: Chrissie Espinosa, who was seen by synchronous (real-time) audio-video technology, and/or his healthcare decision maker, is aware that this patient-initiated, Telehealth encounter on 5/28/2020 is a billable service, with coverage as determined by his insurance carrier. He is aware that he may receive a bill and has provided verbal consent to proceed: Yes. I was in the office while conducting this encounter. Subjective:   Chrissie Espinosa was seen for Medication Refill (f/u) and Behavioral Problem (adhd f/u)      Notes:      Notes no problems with ADHD medications. Prescription Monitoring Program ([place]) database query with fills:  05/27/2020  1   05/27/2020  Dextroamp-Amphet Er 30 MG Cap  30.00 30 Ra Mor   [id number]   Vir (0480)   0   Private Pay   [place]   05/27/2020  1   05/27/2020  Dextroamp-Amphetamin 15 MG Tab  30.00 30 Ra Mor   [id number]   Vir (0480)   0   Private Pay   [place]   04/27/2020  1   03/27/2020  Dextroamp-Amphetamin 15 MG Tab  30.00 30 Cl Chi   [id number]   Vir (0480)   [place]   [facility]   04/27/2020  1   03/27/2020  Dextroamp-Amphet Er 30 MG Cap  30.00 30 Cl Chi   [id number]   Vir (0480)   0   [facility]   03/27/2020  1   03/27/2020  Dextroamp-Amphetamin 15 MG Tab  30.00 30 Cl Chi   [id number]   Vir (0480)   0   [facility]   03/27/2020  1   03/27/2020  Dextroamp-Amphet Er 30 MG Cap  30.00 30 Cl Chi   [id number]   Vir (0480)   0   Private Pay       He is aware had last refill with Dr. Yoon Burris. He just needs 2 remaining months. Follow-up and scheduling reviewed.     Wt Readings from Last 3 Encounters:   08/28/19 121 lb (54.9 kg)   04/30/19 137 lb (62.1 kg)   01/29/19 126 lb (57.2 kg)     BP Readings from Last 3 Encounters:   08/28/19 119/82   04/30/19 101/69   01/29/19 104/71     Pulse Readings from Last 3 Encounters:   08/28/19 (!) 104   04/30/19 87   01/29/19 93       Refills reviewed as below. Nursing screenings reviewed by provider at visit. No Known Allergies    Prior to Admission medications    Medication Sig Start Date End Date Taking? Authorizing Provider   dextroamphetamine-amphetamine (ADDERALL) 15 mg tablet Take 1 Tab by mouth daily as needed (ADHD). With evening classes/work 5/27/20  Yes Ana Lei MD   amphetamine-dextroamphetamine XR (ADDERALL XR) 30 mg XR capsule Take 1 Cap by mouth daily. Max Daily Amount: 30 mg. 5/27/20  Yes Ana Lei MD   dextroamphetamine-amphetamine (ADDERALL) 15 mg tablet Take 1 Tab by mouth daily as needed (ADHD). With evening classes/work 4/26/20  Yes Magdalene Coronel MD   amphetamine-dextroamphetamine XR (ADDERALL XR) 30 mg XR capsule Take 1 Cap by mouth daily. Max Daily Amount: 30 mg. 4/26/20  Yes Magdalene Coronel MD   dextroamphetamine-amphetamine (ADDERALL) 15 mg tablet Take 1 Tab by mouth daily as needed (attention). With evening classes/work 3/27/20  Yes Magdalene Coronel MD   amphetamine-dextroamphetamine XR (ADDERALL XR) 30 mg XR capsule Take 1 Cap by mouth daily. Max Daily Amount: 30 mg. 3/27/20  Yes Magdalene Coronel MD   fluticasone [facility]) 50 mcg/actuation nasal spray 2 Sprays by Both Nostrils route daily. 6/5/17  Yes Ana Lei MD   fexofenadine (ALLEGRA) 180 mg tablet Take 1 Tab by mouth daily. 6/5/17  Yes Ana Lei MD   Omeprazole delayed release (PRILOSEC D/R) 20 mg tablet Take 1 Tab by mouth daily. 1/6/16  Yes Ana Lei MD   cyanocobalamin (B-12 DOTS) 500 mcg tablet Take 1 Tab by mouth daily. 6/2/15  Yes Ana Lei MD   cholecalciferol (VITAMIN D3) 1,000 unit tablet Take 1 Tab by mouth daily. 6/2/15  Yes Ana Lie MD   ALPRAZolam Aldon Nones) 0.25 mg tablet Take 1 Tab by mouth nightly as needed for Anxiety. Max Daily Amount: 0.25 mg. 4/30/19   MD JON Cisneros    PHYSICAL EXAMINATION:    Vital Signs:   There were no vitals taken for this visit. Constitutional: [x] Appears well-developed and well-nourished [x] No apparent distress      Mental status: [x] Alert and awake  [x] Oriented [x] Able to follow commands       Eyes:   EOM    [x]  Normal      Sclera  [x]  Normal              Discharge [x]  None visible       HENT: [x] Normocephalic, atraumatic    [x] Mouth/Throat: Mucous membranes are moist    External Ears [x] Normal      Neck: [x] No visualized mass     Pulmonary/Chest: [x] Respiratory effort normal   [x] No visualized signs of difficulty breathing or respiratory distress    Musculoskeletal:  [x] Normal range of motion of neck    Neurological:        [x] No Facial Asymmetry (Cranial nerve 7 motor function) (limited exam due to video visit)          [x] No gaze palsy     Skin:        [x] No significant exanthematous lesions or discoloration noted on facial skin             Psychiatric:       [x] Normal Affect       Other pertinent observable physical exam findings:  None. We discussed the expected course, resolution and complications of the diagnosis(es) in detail. Medication risks, benefits, costs, interactions, and alternatives were discussed as indicated. I advised him to contact the office if his condition worsens, changes or fails to improve as anticipated. He expressed understanding with the diagnosis(es) and plan. Sarbjit Breaux is a 30 y.o. male who was evaluated by a video visit encounter for concerns as above. Patient identification was verified prior to start of the visit. A caregiver was present when appropriate. Due to this being a TeleHealth encounter (During XWTBN-41 public health emergency), evaluation of the following organ systems was limited: Vitals/Constitutional/EENT/Resp/CV/GI//MS/Neuro/Skin/Heme-Lymph-Imm.   Pursuant to the emergency declaration under the [facility], [address] [place] [address] authority and the [employer] [facility] Act, this Virtual  Visit was conducted, with patient's (and/or legal guardian's) consent, to reduce the patient's risk of exposure to COVID-19 and provide necessary medical care. Services were provided through a video synchronous discussion virtually to substitute for in-person clinic visit. Patient and provider were located at their individual homes. Assessment & Plan:   Diagnoses and all orders for this visit:      ICD-10-CM ICD-9-CM    1. Attention deficit disorder, unspecified hyperactivity presence F98.8 314.00 dextroamphetamine-amphetamine (ADDERALL) 15 mg tablet      amphetamine-dextroamphetamine XR (ADDERALL XR) 30 mg XR capsule      dextroamphetamine-amphetamine (ADDERALL) 15 mg tablet      amphetamine-dextroamphetamine XR (ADDERALL XR) 30 mg XR capsule       Medication refills reviewed. He has current month's supply of both scripts from Dr. Jaime Herrera' refill yesterday. Follow-up and Dispositions    · Return in about 3 months (around 8/28/2020) for medication follow-up--PCP Dr. Jaime Herrera. reviewed medications and side effects in detail    Plan and evaluation (above) reviewed with pt at visit  Patient voiced understanding of plan and provided with time to ask/review questions. After Visit Summary (AVS) provided to pt after visit with additional instructions as needed/reviewed. AVS:  []  Sent to patient as MyChart message after visit. [x]  Mailed to patient after visit. []  Not sent to patient after visit. No future appointments.

## 2020-08-24 DIAGNOSIS — F98.8 ATTENTION DEFICIT DISORDER, UNSPECIFIED HYPERACTIVITY PRESENCE: ICD-10-CM

## 2020-08-24 RX ORDER — DEXTROAMPHETAMINE SACCHARATE, AMPHETAMINE ASPARTATE, DEXTROAMPHETAMINE SULFATE AND AMPHETAMINE SULFATE 3.75; 3.75; 3.75; 3.75 MG/1; MG/1; MG/1; MG/1
15 TABLET ORAL
Qty: 30 TAB | Refills: 0 | Status: CANCELLED | OUTPATIENT
Start: 2020-08-24

## 2020-08-24 RX ORDER — DEXTROAMPHETAMINE SACCHARATE, AMPHETAMINE ASPARTATE MONOHYDRATE, DEXTROAMPHETAMINE SULFATE AND AMPHETAMINE SULFATE 7.5; 7.5; 7.5; 7.5 MG/1; MG/1; MG/1; MG/1
30 CAPSULE, EXTENDED RELEASE ORAL DAILY
Qty: 30 CAP | Refills: 0 | Status: CANCELLED | OUTPATIENT
Start: 2020-08-24

## 2020-08-24 NOTE — TELEPHONE ENCOUNTER
Pt's requesting a refill on his Adderall XR 30 mg as well as the Adderall 15 mg. Pt only has enough until tomorrow pt is scheduled to have a VV with  on 8/26/20.

## 2020-08-26 ENCOUNTER — VIRTUAL VISIT (OUTPATIENT)
Dept: INTERNAL MEDICINE CLINIC | Age: 25
End: 2020-08-26

## 2020-08-26 DIAGNOSIS — F98.8 ATTENTION DEFICIT DISORDER, UNSPECIFIED HYPERACTIVITY PRESENCE: Primary | ICD-10-CM

## 2020-08-26 DIAGNOSIS — J30.2 SEASONAL ALLERGIC RHINITIS, UNSPECIFIED TRIGGER: ICD-10-CM

## 2020-08-26 DIAGNOSIS — K21.9 GASTROESOPHAGEAL REFLUX DISEASE WITHOUT ESOPHAGITIS: ICD-10-CM

## 2020-08-26 PROCEDURE — 99214 OFFICE O/P EST MOD 30 MIN: CPT | Performed by: INTERNAL MEDICINE

## 2020-08-26 RX ORDER — DEXTROAMPHETAMINE SACCHARATE, AMPHETAMINE ASPARTATE, DEXTROAMPHETAMINE SULFATE AND AMPHETAMINE SULFATE 3.75; 3.75; 3.75; 3.75 MG/1; MG/1; MG/1; MG/1
15 TABLET ORAL
Qty: 30 TAB | Refills: 0 | Status: SHIPPED | OUTPATIENT
Start: 2020-10-29 | End: 2020-11-23 | Stop reason: SDUPTHER

## 2020-08-26 RX ORDER — DEXTROAMPHETAMINE SACCHARATE, AMPHETAMINE ASPARTATE, DEXTROAMPHETAMINE SULFATE AND AMPHETAMINE SULFATE 3.75; 3.75; 3.75; 3.75 MG/1; MG/1; MG/1; MG/1
15 TABLET ORAL
Qty: 30 TAB | Refills: 0 | Status: SHIPPED | OUTPATIENT
Start: 2020-09-29 | End: 2020-11-23 | Stop reason: SDUPTHER

## 2020-08-26 RX ORDER — DEXTROAMPHETAMINE SACCHARATE, AMPHETAMINE ASPARTATE, DEXTROAMPHETAMINE SULFATE AND AMPHETAMINE SULFATE 3.75; 3.75; 3.75; 3.75 MG/1; MG/1; MG/1; MG/1
15 TABLET ORAL
Qty: 30 TAB | Refills: 0 | Status: SHIPPED | OUTPATIENT
Start: 2020-08-30 | End: 2020-11-23 | Stop reason: SDUPTHER

## 2020-08-26 RX ORDER — DEXTROAMPHETAMINE SACCHARATE, AMPHETAMINE ASPARTATE MONOHYDRATE, DEXTROAMPHETAMINE SULFATE AND AMPHETAMINE SULFATE 7.5; 7.5; 7.5; 7.5 MG/1; MG/1; MG/1; MG/1
30 CAPSULE, EXTENDED RELEASE ORAL DAILY
Qty: 30 CAP | Refills: 0 | Status: SHIPPED | OUTPATIENT
Start: 2020-10-25 | End: 2020-11-23 | Stop reason: SDUPTHER

## 2020-08-26 RX ORDER — DEXTROAMPHETAMINE SACCHARATE, AMPHETAMINE ASPARTATE MONOHYDRATE, DEXTROAMPHETAMINE SULFATE AND AMPHETAMINE SULFATE 7.5; 7.5; 7.5; 7.5 MG/1; MG/1; MG/1; MG/1
30 CAPSULE, EXTENDED RELEASE ORAL DAILY
Qty: 30 CAP | Refills: 0 | Status: SHIPPED | OUTPATIENT
Start: 2020-08-26 | End: 2020-11-23 | Stop reason: SDUPTHER

## 2020-08-26 RX ORDER — DEXTROAMPHETAMINE SACCHARATE, AMPHETAMINE ASPARTATE MONOHYDRATE, DEXTROAMPHETAMINE SULFATE AND AMPHETAMINE SULFATE 7.5; 7.5; 7.5; 7.5 MG/1; MG/1; MG/1; MG/1
30 CAPSULE, EXTENDED RELEASE ORAL DAILY
Qty: 30 CAP | Refills: 0 | Status: SHIPPED | OUTPATIENT
Start: 2020-09-25 | End: 2020-11-23 | Stop reason: SDUPTHER

## 2020-08-26 NOTE — PROGRESS NOTES
Ajay Brady is a 30 y.o. male who was seen by synchronous (real-time) audio-video technology on 8/26/2020. Consent: Ajay Brady, who was seen by synchronous (real-time) audio-video technology, and/or his healthcare decision maker, is aware that this patient-initiated, Telehealth encounter on 8/26/2020 is a billable service, with coverage as determined by his insurance carrier. He is aware that he may receive a bill and has provided verbal consent to proceed: Yes. I was in the office while conducting this encounter. Subjective:   Ajay Brady was seen for Medication Evaluation (ADHD medication)      Notes:      Notes no problems with ADHD medications. Prescription Monitoring Program ([place]) database query with fills:  07/31/2020  1   05/28/2020  Dextroamp-Amphetamin 15 MG Tab  30.00 30 Cl Chi   [id number]   Vir (0480)   0   Private Pay   [place]   07/26/2020  1   05/28/2020  Dextroamp-Amphet Er 30 MG Cap  30.00 30 Cl Chi   [id number]   Vir (0480)   0   Private Pay   [place]   06/26/2020  1   05/28/2020  Dextroamp-Amphetamin 15 MG Tab  30.00 30 Cl Chi   [id number]   [place] (0480)   [place]   06/26/2020  1   05/28/2020  Dextroamp-Amphet Er 30 MG Cap  30.00 30 Cl Chi   [id number]   Vir (0480)   [place]   [facility] [place]   [facility]   05/27/2020  1   05/27/2020  Dextroamp-Amphetamin 15 MG Tab  30.00 30 Ra Mor   [id number]   Vir (0480)   0   Private Pay   [place]   05/27/2020  1   05/27/2020  Dextroamp-Amphet Er 30 MG Cap  30.00 30 Ra Mor   [id number]   Vir (0480)   [place]         Wt Readings from Last 3 Encounters:   08/28/19 121 lb (54.9 kg)   04/30/19 137 lb (62.1 kg)   01/29/19 126 lb (57.2 kg)     BP Readings from Last 3 Encounters:   08/28/19 119/82   04/30/19 101/69   01/29/19 104/71     Pulse Readings from Last 3 Encounters:   08/28/19 (!) 104   04/30/19 87   01/29/19 93       Refills reviewed as below. No problems with appetite or SE's of medications. He is due for refill of XR Adderall today.   He is not due for refill of IR Adderal until 8/30, and has meds to last until then. Reviewed refills would be staggered, based on last refills, and he is agreeable with plan. Pharmacy confirmed at visit. He has no current problems with allergies, GERD, or meds. No refills needed for non-ADHD meds below. Nursing screenings reviewed by provider at visit. No Known Allergies    Prior to Admission medications    Medication Sig Start Date End Date Taking? Authorizing Provider   dextroamphetamine-amphetamine (ADDERALL) 15 mg tablet Take 1 Tab by mouth daily as needed (ADHD). With evening classes/work 7/26/20  Yes Hayden Ricks MD   amphetamine-dextroamphetamine XR (ADDERALL XR) 30 mg XR capsule Take 1 Cap by mouth daily. Max Daily Amount: 30 mg. 7/26/20  Yes Hayden Ricks MD   dextroamphetamine-amphetamine (ADDERALL) 15 mg tablet Take 1 Tab by mouth daily as needed (attention). With evening classes/work 6/26/20  Yes Hayden Ricks MD   amphetamine-dextroamphetamine XR (ADDERALL XR) 30 mg XR capsule Take 1 Cap by mouth daily. Max Daily Amount: 30 mg. 6/26/20  Yes Hayden Ricks MD   dextroamphetamine-amphetamine (ADDERALL) 15 mg tablet Take 1 Tab by mouth daily as needed (ADHD). With evening classes/work 5/27/20  Yes Hayde Thurman MD   amphetamine-dextroamphetamine XR (ADDERALL XR) 30 mg XR capsule Take 1 Cap by mouth daily. Max Daily Amount: 30 mg. 5/27/20  Yes Hayde Thurman MD   fluticasone [facility]) 50 mcg/actuation nasal spray 2 Sprays by Both Nostrils route daily. 6/5/17  Yes Hayde Thurman MD   fexofenadine (ALLEGRA) 180 mg tablet Take 1 Tab by mouth daily. 6/5/17  Yes Hayde Thurman MD   Omeprazole delayed release (PRILOSEC D/R) 20 mg tablet Take 1 Tab by mouth daily. 1/6/16  Yes Hayde Thurman MD   cyanocobalamin (B-12 DOTS) 500 mcg tablet Take 1 Tab by mouth daily.  6/2/15  Yes Hayde Thurman MD   cholecalciferol (VITAMIN D3) 1,000 unit tablet Take 1 Tab by mouth daily. 6/2/15  Yes Angelic Bailey MD   ALPRAZolam Iman Rickey) 0.25 mg tablet Take 1 Tab by mouth nightly as needed for Anxiety. Max Daily Amount: 0.25 mg. Patient not taking: Reported on 8/26/2020 4/30/19   Angelic Bailey MD         ROS    PHYSICAL EXAMINATION:    Vital Signs: There were no vitals taken for this visit. No flowsheet data found. Constitutional: [x] Appears well-developed and well-nourished [x] No apparent distress      Mental status: [x] Alert and awake  [x] Oriented [x] Able to follow commands       Eyes:   EOM    [x]  Normal      Sclera  [x]  Normal              Discharge [x]  None visible       HENT: [x] Normocephalic, atraumatic    [x] Mouth/Throat: Mucous membranes are moist    External Ears [x] Normal      Neck: [x] No visualized mass     Pulmonary/Chest: [x] Respiratory effort normal   [x] No visualized signs of difficulty breathing or respiratory distress    Musculoskeletal:  [x] Normal range of motion of neck    Neurological:        [x] No Facial Asymmetry (Cranial nerve 7 motor function) (limited exam due to video visit)          [x] No gaze palsy     Skin:        [x] No significant exanthematous lesions or discoloration noted on facial skin             Psychiatric:       [x] Normal Affect       Other pertinent observable physical exam findings:  None. We discussed the expected course, resolution and complications of the diagnosis(es) in detail. Medication risks, benefits, costs, interactions, and alternatives were discussed as indicated. I advised him to contact the office if his condition worsens, changes or fails to improve as anticipated. He expressed understanding with the diagnosis(es) and plan. Shaylee Will is a 30 y.o. male who was evaluated by a video visit encounter for concerns as above. Patient identification was verified prior to start of the visit. A caregiver was present when appropriate.  Due to this being a TeleHealth encounter (During DPWLV-95 public health emergency), evaluation of the following organ systems was limited: Vitals/Constitutional/EENT/Resp/CV/GI//MS/Neuro/Skin/Heme-Lymph-Imm. Pursuant to the emergency declaration under the [facility] [place] [address] waiver authority and the [employer] Act, this Virtual  Visit was conducted, with patient's (and/or legal guardian's) consent, to reduce the patient's risk of exposure to COVID-19 and provide necessary medical care. Services were provided through a video synchronous discussion virtually to substitute for in-person clinic visit. Assessment & Plan:   Diagnoses and all orders for this visit:      ICD-10-CM ICD-9-CM    1. Attention deficit disorder, unspecified hyperactivity presence  F98.8 314.00 dextroamphetamine-amphetamine (ADDERALL) 15 mg tablet      amphetamine-dextroamphetamine XR (ADDERALL XR) 30 mg XR capsule      dextroamphetamine-amphetamine (ADDERALL) 15 mg tablet      amphetamine-dextroamphetamine XR (ADDERALL XR) 30 mg XR capsule      dextroamphetamine-amphetamine (ADDERALL) 15 mg tablet      amphetamine-dextroamphetamine XR (ADDERALL XR) 30 mg XR capsule   2. Seasonal allergic rhinitis, unspecified trigger  J30.2 477.9    3. Gastroesophageal reflux disease without esophagitis  K21.9 530.81        1. Refills and timing reviewed at visit. 2,3:  Stable on current meds. No refills needed at this time. Follow-up and Dispositions    · Return in about 3 months (around 11/26/2020), or if symptoms worsen or fail to improve, for medication follow-up.       reviewed diet, exercise and weight control  reviewed medications and side effects in detail    Plan and evaluation (above) reviewed with pt at visit  Patient voiced understanding of plan and provided with time to ask/review questions.   After Visit Summary (AVS) provided to pt after visit with additional instructions as needed/reviewed. AVS:  []  Sent to patient as MyChart message after visit. [x]  Mailed to patient after visit. []  Not sent to patient after visit. No future appointments.

## 2020-08-26 NOTE — PROGRESS NOTES
Virtual Visit-doxy video     Chief Complaint   Patient presents with    Medication Evaluation     ADHD medication     1. Have you been to the ER, urgent care clinic since your last visit? Hospitalized since your last visit? No    2. Have you seen or consulted any other health care providers outside of the [address] [place] [address] since your last visit? Include any pap smears or colon screening. No     Health Maintenance Due   Topic Date Due    DTaP/Tdap/Td series (1 - Tdap) 02/16/2014       Abuse Screening Questionnaire 8/26/2020   Do you ever feel afraid of your partner? N   Are you in a relationship with someone who physically or mentally threatens you? N   Is it safe for you to go home?  Y     3 most recent PHQ Screens 8/26/2020   Little interest or pleasure in doing things Not at all   Feeling down, depressed, irritable, or hopeless Not at all   Total Score PHQ 2 0

## 2020-11-23 ENCOUNTER — VIRTUAL VISIT (OUTPATIENT)
Dept: INTERNAL MEDICINE CLINIC | Age: 25
End: 2020-11-23
Payer: COMMERCIAL

## 2020-11-23 DIAGNOSIS — E55.9 VITAMIN D DEFICIENCY: ICD-10-CM

## 2020-11-23 DIAGNOSIS — E53.8 B12 DEFICIENCY: ICD-10-CM

## 2020-11-23 DIAGNOSIS — F98.8 ATTENTION DEFICIT DISORDER, UNSPECIFIED HYPERACTIVITY PRESENCE: Primary | ICD-10-CM

## 2020-11-23 PROCEDURE — 99214 OFFICE O/P EST MOD 30 MIN: CPT | Performed by: INTERNAL MEDICINE

## 2020-11-23 RX ORDER — DEXTROAMPHETAMINE SACCHARATE, AMPHETAMINE ASPARTATE, DEXTROAMPHETAMINE SULFATE AND AMPHETAMINE SULFATE 3.75; 3.75; 3.75; 3.75 MG/1; MG/1; MG/1; MG/1
15 TABLET ORAL
Qty: 30 TAB | Refills: 0 | Status: SHIPPED | OUTPATIENT
Start: 2021-01-01 | End: 2021-01-09 | Stop reason: SDUPTHER

## 2020-11-23 RX ORDER — DEXTROAMPHETAMINE SACCHARATE, AMPHETAMINE ASPARTATE, DEXTROAMPHETAMINE SULFATE AND AMPHETAMINE SULFATE 3.75; 3.75; 3.75; 3.75 MG/1; MG/1; MG/1; MG/1
15 TABLET ORAL
Qty: 30 TAB | Refills: 0 | Status: SHIPPED | OUTPATIENT
Start: 2021-01-31 | End: 2021-01-09 | Stop reason: SDUPTHER

## 2020-11-23 RX ORDER — DEXTROAMPHETAMINE SACCHARATE, AMPHETAMINE ASPARTATE MONOHYDRATE, DEXTROAMPHETAMINE SULFATE AND AMPHETAMINE SULFATE 7.5; 7.5; 7.5; 7.5 MG/1; MG/1; MG/1; MG/1
30 CAPSULE, EXTENDED RELEASE ORAL DAILY
Qty: 30 CAP | Refills: 0 | Status: SHIPPED | OUTPATIENT
Start: 2021-01-23 | End: 2021-02-19 | Stop reason: SDUPTHER

## 2020-11-23 RX ORDER — DEXTROAMPHETAMINE SACCHARATE, AMPHETAMINE ASPARTATE MONOHYDRATE, DEXTROAMPHETAMINE SULFATE AND AMPHETAMINE SULFATE 7.5; 7.5; 7.5; 7.5 MG/1; MG/1; MG/1; MG/1
30 CAPSULE, EXTENDED RELEASE ORAL DAILY
Qty: 30 CAP | Refills: 0 | Status: SHIPPED | OUTPATIENT
Start: 2020-12-24 | End: 2021-02-19 | Stop reason: SDUPTHER

## 2020-11-23 RX ORDER — DEXTROAMPHETAMINE SACCHARATE, AMPHETAMINE ASPARTATE, DEXTROAMPHETAMINE SULFATE AND AMPHETAMINE SULFATE 3.75; 3.75; 3.75; 3.75 MG/1; MG/1; MG/1; MG/1
15 TABLET ORAL
Qty: 30 TAB | Refills: 0 | Status: SHIPPED | OUTPATIENT
Start: 2020-12-02 | End: 2021-02-19 | Stop reason: SDUPTHER

## 2020-11-23 RX ORDER — DEXTROAMPHETAMINE SACCHARATE, AMPHETAMINE ASPARTATE MONOHYDRATE, DEXTROAMPHETAMINE SULFATE AND AMPHETAMINE SULFATE 7.5; 7.5; 7.5; 7.5 MG/1; MG/1; MG/1; MG/1
30 CAPSULE, EXTENDED RELEASE ORAL DAILY
Qty: 30 CAP | Refills: 0 | Status: SHIPPED | OUTPATIENT
Start: 2020-11-24 | End: 2021-01-06 | Stop reason: SDUPTHER

## 2020-11-23 NOTE — PROGRESS NOTES
Vital visit  Did not have a flu vaccine    Chief Complaint   Patient presents with    Medication Refill     follow up       1. Have you been to the ER, urgent care clinic since your last visit? Hospitalized since your last visit? No    2. Have you seen or consulted any other health care providers outside of the [address] [place] [address] since your last visit? Include any pap smears or colon screening. No    Health Maintenance Due   Topic Date Due    DTaP/Tdap/Td series (1 - Tdap) 02/16/2014    Flu Vaccine (1) 09/01/2020       Learning Assessment 11/1/2018   PRIMARY LEARNER Patient   HIGHEST LEVEL OF EDUCATION - PRIMARY LEARNER  SOME COLLEGE   BARRIERS PRIMARY LEARNER NONE   CO-LEARNER CAREGIVER No   PRIMARY LANGUAGE ENGLISH   LEARNER PREFERENCE PRIMARY VIDEOS   ANSWERED BY patient   RELATIONSHIP SELF       AVS, education, follow up and recommendations provided and addressed with patient.   services used to advise patient no

## 2020-11-23 NOTE — PATIENT INSTRUCTIONS
We can update labs here or at [facility]. 
If you prefer to do here, you can do next appt as in-office instead of virtual, as reviewed.

## 2020-11-23 NOTE — PROGRESS NOTES
Gretta White is a 30 y.o. male who was seen by synchronous (real-time) audio-video technology on 11/23/2020. Consent: Gretta White, who was seen by synchronous (real-time) audio-video technology, and/or his healthcare decision maker, is aware that this patient-initiated, Telehealth encounter on 11/23/2020 is a billable service, with coverage as determined by his insurance carrier. He is aware that he may receive a bill and has provided verbal consent to proceed: Yes. I was in the office while conducting this encounter. Subjective:   Gretta White was seen for Medication Refill (follow up)      Notes:    Notes no problems with ADHD medications. Prescription Monitoring Program ([place]) database query with fills:  11/02/2020  1   08/26/2020  Dextroamp-Amphetamin 15 MG Tab  30.00  30 Cl Chi   [id number]   Vir (0480)   0   Private Pay   [place]   10/25/2020  1   08/26/2020  Dextroamp-Amphet Er 30 MG Cap  30.00  30 Cl Chi   [id number]   Vir (0480)   [place]   [facility] [place]   [facility]   10/03/2020  1   08/26/2020  Dextroamp-Amphetamin 15 MG Tab  30.00  30 Cl Chi   [id number]   Vir (0480)   0   Private Pay   [place]   09/25/2020  1   08/26/2020  Dextroamp-Amphet Er 30 MG Cap  30.00  30 Cl Chi   [id number]   Vir (0480)   [place]   [facility] [place]   [facility]   09/01/2020  1   08/26/2020  Dextroamp-Amphetamin 15 MG Tab  30.00  30 Cl Chi   [id number]   Vir (0480)   0   [place]   08/26/2020  1   08/26/2020  Dextroamp-Amphet Er 30 MG Cap  30.00  30 Cl Chi   [id number]   Vir (0480)   [place]   [facility]   07/31/2020  1   05/28/2020  Dextroamp-Amphetamin 15 MG Tab  30.00  30 Cl Chi   [id number]   Vir (6724)   [place]   [facility] [place]   [facility]     Reviewed last refills and timing of future refills. Wt Readings from Last 3 Encounters:   08/28/19 121 lb (54.9 kg)   04/30/19 137 lb (62.1 kg)   01/29/19 126 lb (57.2 kg)     Notes weight stable. No supplements other than B12 and Vit D (see below).     BP Readings from Last 3 Encounters:   08/28/19 119/82   04/30/19 101/69 01/29/19 104/71     Pulse Readings from Last 3 Encounters:   08/28/19 (!) 104   04/30/19 87   01/29/19 93       Refills reviewed as below. He takes his Vit D and B12 supplements, and interested in updating labs in future. Reviewed could do follow-up in 3mo in office and update labs then. He will consider COVID situation closer to follow-up for planning. Reviewed labs here or with Jacob Mckeon as preferred as well, if doing VV's. Nursing screenings reviewed by provider at visit. No Known Allergies    Prior to Admission medications    Medication Sig Start Date End Date Taking? Authorizing Provider   dextroamphetamine-amphetamine (ADDERALL) 15 mg tablet Take 1 Tab by mouth daily as needed (ADHD). With evening classes/work 10/29/20  Yes Mary Galvin MD   amphetamine-dextroamphetamine XR (ADDERALL XR) 30 mg XR capsule Take 1 Cap by mouth daily. Max Daily Amount: 30 mg. 10/25/20  Yes Mary Galvin MD   fluticasone [facility]) 50 mcg/actuation nasal spray 2 Sprays by Both Nostrils route daily. 6/5/17  Yes Burgess Rafael MD   fexofenadine (ALLEGRA) 180 mg tablet Take 1 Tab by mouth daily. 6/5/17  Yes Burgess Rafael MD   Omeprazole delayed release (PRILOSEC D/R) 20 mg tablet Take 1 Tab by mouth daily. 1/6/16  Yes Burgess Rafael MD   cyanocobalamin (B-12 DOTS) 500 mcg tablet Take 1 Tab by mouth daily. 6/2/15  Yes Burgess Rafael MD   cholecalciferol (VITAMIN D3) 1,000 unit tablet Take 1 Tab by mouth daily. 6/2/15  Yes Burgess Rafael MD   dextroamphetamine-amphetamine (ADDERALL) 15 mg tablet Take 1 Tab by mouth daily as needed (attention). With evening classes/work 9/29/20   Mary Galvin MD   amphetamine-dextroamphetamine XR (ADDERALL XR) 30 mg XR capsule Take 1 Cap by mouth daily. Max Daily Amount: 30 mg. 9/25/20   Mary Galvin MD   dextroamphetamine-amphetamine (ADDERALL) 15 mg tablet Take 1 Tab by mouth daily as needed (ADHD).  With evening classes/work 8/30/20   Kings Matthew MD   amphetamine-dextroamphetamine XR (ADDERALL XR) 30 mg XR capsule Take 1 Cap by mouth daily. Max Daily Amount: 30 mg. 8/26/20   Kings Matthew MD   ALPRAZolam Darlene Old) 0.25 mg tablet Take 1 Tab by mouth nightly as needed for Anxiety. Max Daily Amount: 0.25 mg. Patient not taking: Reported on 8/26/2020 4/30/19   MD JON Cheek    PHYSICAL EXAMINATION:    Vital Signs: There were no vitals taken for this visit. No flowsheet data found. Constitutional: [x] Appears well-developed and well-nourished [x] No apparent distress      Mental status: [x] Alert and awake  [x] Oriented [x] Able to follow commands       Eyes:   EOM    [x]  Normal      Sclera  [x]  Normal              Discharge [x]  None visible       HENT: [x] Normocephalic, atraumatic    [x] Mouth/Throat: Mucous membranes are moist    External Ears [x] Normal      Neck: [x] No visualized mass     Pulmonary/Chest: [x] Respiratory effort normal   [x] No visualized signs of difficulty breathing or respiratory distress    Musculoskeletal:  [x] Normal range of motion of neck    Neurological:        [x] No Facial Asymmetry (Cranial nerve 7 motor function) (limited exam due to video visit)          [x] No gaze palsy     Skin:        [x] No significant exanthematous lesions or discoloration noted on facial skin             Psychiatric:       [x] Normal Affect       Other pertinent observable physical exam findings:  None. We discussed the expected course, resolution and complications of the diagnosis(es) in detail. Medication risks, benefits, costs, interactions, and alternatives were discussed as indicated. I advised him to contact the office if his condition worsens, changes or fails to improve as anticipated. He expressed understanding with the diagnosis(es) and plan. Shreya Morley is a 30 y.o. male who was evaluated by a video visit encounter for concerns as above.  Patient identification was verified prior to start of the visit. A caregiver was present when appropriate. Due to this being a TeleHealth encounter (During [facility] public health emergency), evaluation of the following organ systems was limited: Vitals/Constitutional/EENT/Resp/CV/GI//MS/Neuro/Skin/Heme-Lymph-Imm. Pursuant to the emergency declaration under the [address] [place] [address] waiver authority and the [employer] Act, this Virtual  Visit was conducted, with patient's (and/or legal guardian's) consent, to reduce the patient's risk of exposure to COVID-19 and provide necessary medical care. Services were provided through a video synchronous discussion virtually to substitute for in-person clinic visit. Assessment & Plan:   Diagnoses and all orders for this visit:      ICD-10-CM ICD-9-CM    1. Attention deficit disorder, unspecified hyperactivity presence  F98.8 314.00 dextroamphetamine-amphetamine (ADDERALL) 15 mg tablet      amphetamine-dextroamphetamine XR (ADDERALL XR) 30 mg XR capsule      dextroamphetamine-amphetamine (ADDERALL) 15 mg tablet      amphetamine-dextroamphetamine XR (ADDERALL XR) 30 mg XR capsule      dextroamphetamine-amphetamine (ADDERALL) 15 mg tablet      amphetamine-dextroamphetamine XR (ADDERALL XR) 30 mg XR capsule   2. Vitamin D deficiency  E55.9 268.9    3. B12 deficiency  E53.8 266.2        1. Refills, and timing/when due reviewed with pt.    2,3:  He will review labs at follow-up, or schedule in-office visit/labs when ready to update. Continuing current supplements at this time.       Follow-up and Dispositions    · Return in about 3 months (around 2/23/2021), or if symptoms worsen or fail to improve, for medication follow-up.       lab results and schedule of future lab studies reviewed with patient  reviewed medications and side effects in detail    For additional documentation of information and/or recommendations discussed this visit, please see notes in instructions. Plan and evaluation (above) reviewed with pt at visit  Patient voiced understanding of plan and provided with time to ask/review questions. After Visit Summary (AVS) provided to pt after visit with additional instructions as needed/reviewed. AVS:  []  Sent to patient as MyChart message after visit. [x]  Mailed to patient after visit. []  Not sent to patient after visit. No future appointments.

## 2020-11-23 NOTE — LETTER
11/23/2020 11:58 AM 
 
 
 
Mr. Dale Cameron [address] [facility] [address] Please see enclosed After Visit Summary (AVS).

## 2021-01-06 DIAGNOSIS — F98.8 ATTENTION DEFICIT DISORDER, UNSPECIFIED HYPERACTIVITY PRESENCE: ICD-10-CM

## 2021-01-06 RX ORDER — DEXTROAMPHETAMINE SACCHARATE, AMPHETAMINE ASPARTATE MONOHYDRATE, DEXTROAMPHETAMINE SULFATE AND AMPHETAMINE SULFATE 7.5; 7.5; 7.5; 7.5 MG/1; MG/1; MG/1; MG/1
30 CAPSULE, EXTENDED RELEASE ORAL DAILY
Qty: 30 CAP | Refills: 0 | Status: CANCELLED | OUTPATIENT
Start: 2021-01-23

## 2021-01-06 RX ORDER — DEXTROAMPHETAMINE SACCHARATE, AMPHETAMINE ASPARTATE MONOHYDRATE, DEXTROAMPHETAMINE SULFATE AND AMPHETAMINE SULFATE 7.5; 7.5; 7.5; 7.5 MG/1; MG/1; MG/1; MG/1
30 CAPSULE, EXTENDED RELEASE ORAL DAILY
Qty: 30 CAP | Refills: 0 | Status: CANCELLED | OUTPATIENT
Start: 2021-01-06

## 2021-01-06 NOTE — TELEPHONE ENCOUNTER
Spoke with pharmacist Ozzy Mendez 411 who states that adderall medication is not in stock and will not be until middle of next week. Please [facility] [place] [phone].

## 2021-01-06 NOTE — TELEPHONE ENCOUNTER
Please send pt adderall to  
[phone]  
[facility] [place] [facility] for a while and they couldn't transfer the script

## 2021-01-09 RX ORDER — DEXTROAMPHETAMINE SACCHARATE, AMPHETAMINE ASPARTATE MONOHYDRATE, DEXTROAMPHETAMINE SULFATE AND AMPHETAMINE SULFATE 7.5; 7.5; 7.5; 7.5 MG/1; MG/1; MG/1; MG/1
30 CAPSULE, EXTENDED RELEASE ORAL DAILY
Qty: 30 CAP | Refills: 0 | Status: SHIPPED | OUTPATIENT
Start: 2021-01-23 | End: 2021-02-19 | Stop reason: SDUPTHER

## 2021-01-09 RX ORDER — DEXTROAMPHETAMINE SACCHARATE, AMPHETAMINE ASPARTATE, DEXTROAMPHETAMINE SULFATE AND AMPHETAMINE SULFATE 3.75; 3.75; 3.75; 3.75 MG/1; MG/1; MG/1; MG/1
15 TABLET ORAL
Qty: 30 TAB | Refills: 0 | Status: SHIPPED | OUTPATIENT
Start: 2021-02-08 | End: 2021-02-19 | Stop reason: SDUPTHER

## 2021-01-09 RX ORDER — DEXTROAMPHETAMINE SACCHARATE, AMPHETAMINE ASPARTATE, DEXTROAMPHETAMINE SULFATE AND AMPHETAMINE SULFATE 3.75; 3.75; 3.75; 3.75 MG/1; MG/1; MG/1; MG/1
15 TABLET ORAL
Qty: 30 TAB | Refills: 0 | Status: SHIPPED | OUTPATIENT
Start: 2021-01-09 | End: 2021-02-19 | Stop reason: SDUPTHER

## 2021-01-09 NOTE — TELEPHONE ENCOUNTER
Adderall requested to be sent to another pharmacy, since not available at original pharmacy, and unable to transfer controlled scripts. Refill request(s) approved--as below. Prescription Monitoring Program ([facility]) database query with fills: 
12/24/2020  1   11/23/2020  Dextroamp-Amphet Er 30 MG Cap  30.00  30 Cl Chi   [id number]   Vir (0480)   [place]   [facility] [place]   [facility]  
12/02/2020  1   11/23/2020  Dextroamp-Amphetamin 15 MG Tab  30.00  30 Cl Chi   [id number]   Vir (0480)   [place]   [facility] [place]   [facility]  
11/24/2020  1   11/23/2020  Dextroamp-Amphet Er 30 MG Cap  30.00  30 Cl Chi   [id number]   Vir (0480)   [place]   [facility] [place]   [facility]  
11/02/2020  1   08/26/2020  Dextroamp-Amphetamin 15 MG Tab  30.00  30 Cl Chi   [id number]   Vir (6315)   [place]   [place]   [facility] [place]. LOV 11/23/20. He should only need scripts transferred as below--the 3 he has not yet filled (from 11-23-20 visit) as per  above. Requested Prescriptions Signed Prescriptions Disp Refills  amphetamine-dextroamphetamine XR (ADDERALL XR) 30 mg XR capsule 30 Cap 0 Sig: Take 1 Cap by mouth daily. Max Daily Amount: 30 mg.--to refill on/after 1-23-21. Authorizing Provider: Anne Miller dextroamphetamine-amphetamine (ADDERALL) 15 mg tablet 30 Tab 0 Sig: Take 1 Tab by mouth daily as needed (ADHD). With evening classes/work--to refill today. Authorizing Provider: Anne Miller dextroamphetamine-amphetamine (ADDERALL) 15 mg tablet 30 Tab 0 Sig: Take 1 Tab by mouth daily as needed (attention). With evening classes/work--to refill on/after 2/8/21.   
  Authorizing Provider: Alexis Weiss

## 2021-01-11 ENCOUNTER — TRANSCRIBE ORDER (OUTPATIENT)
Dept: NEUROLOGY | Age: 26
End: 2021-01-11

## 2021-02-19 ENCOUNTER — VIRTUAL VISIT (OUTPATIENT)
Dept: INTERNAL MEDICINE CLINIC | Age: 26
End: 2021-02-19

## 2021-02-19 DIAGNOSIS — F98.8 ATTENTION DEFICIT DISORDER, UNSPECIFIED HYPERACTIVITY PRESENCE: ICD-10-CM

## 2021-02-19 PROCEDURE — 99213 OFFICE O/P EST LOW 20 MIN: CPT | Performed by: INTERNAL MEDICINE

## 2021-02-19 RX ORDER — DEXTROAMPHETAMINE SACCHARATE, AMPHETAMINE ASPARTATE, DEXTROAMPHETAMINE SULFATE AND AMPHETAMINE SULFATE 3.75; 3.75; 3.75; 3.75 MG/1; MG/1; MG/1; MG/1
15 TABLET ORAL
Qty: 30 TAB | Refills: 0 | Status: SHIPPED | OUTPATIENT
Start: 2021-03-24 | End: 2021-02-19 | Stop reason: SDUPTHER

## 2021-02-19 RX ORDER — DEXTROAMPHETAMINE SACCHARATE, AMPHETAMINE ASPARTATE, DEXTROAMPHETAMINE SULFATE AND AMPHETAMINE SULFATE 3.75; 3.75; 3.75; 3.75 MG/1; MG/1; MG/1; MG/1
15 TABLET ORAL
Qty: 30 TAB | Refills: 0 | Status: SHIPPED | OUTPATIENT
Start: 2021-04-12 | End: 2021-06-11 | Stop reason: SDUPTHER

## 2021-02-19 RX ORDER — DEXTROAMPHETAMINE SACCHARATE, AMPHETAMINE ASPARTATE MONOHYDRATE, DEXTROAMPHETAMINE SULFATE AND AMPHETAMINE SULFATE 7.5; 7.5; 7.5; 7.5 MG/1; MG/1; MG/1; MG/1
30 CAPSULE, EXTENDED RELEASE ORAL DAILY
Qty: 30 CAP | Refills: 0 | Status: SHIPPED | OUTPATIENT
Start: 2021-04-23 | End: 2021-06-11 | Stop reason: SDUPTHER

## 2021-02-19 RX ORDER — DEXTROAMPHETAMINE SACCHARATE, AMPHETAMINE ASPARTATE, DEXTROAMPHETAMINE SULFATE AND AMPHETAMINE SULFATE 3.75; 3.75; 3.75; 3.75 MG/1; MG/1; MG/1; MG/1
15 TABLET ORAL
Qty: 30 TAB | Refills: 0 | Status: SHIPPED | OUTPATIENT
Start: 2021-04-23 | End: 2021-02-19 | Stop reason: SDUPTHER

## 2021-02-19 RX ORDER — DEXTROAMPHETAMINE SACCHARATE, AMPHETAMINE ASPARTATE MONOHYDRATE, DEXTROAMPHETAMINE SULFATE AND AMPHETAMINE SULFATE 7.5; 7.5; 7.5; 7.5 MG/1; MG/1; MG/1; MG/1
30 CAPSULE, EXTENDED RELEASE ORAL DAILY
Qty: 30 CAP | Refills: 0 | Status: SHIPPED | OUTPATIENT
Start: 2021-03-24 | End: 2021-06-11 | Stop reason: SDUPTHER

## 2021-02-19 RX ORDER — DEXTROAMPHETAMINE SACCHARATE, AMPHETAMINE ASPARTATE, DEXTROAMPHETAMINE SULFATE AND AMPHETAMINE SULFATE 3.75; 3.75; 3.75; 3.75 MG/1; MG/1; MG/1; MG/1
15 TABLET ORAL
Qty: 30 TAB | Refills: 0 | Status: SHIPPED | OUTPATIENT
Start: 2021-05-12 | End: 2021-06-11 | Stop reason: SDUPTHER

## 2021-02-19 RX ORDER — DEXTROAMPHETAMINE SACCHARATE, AMPHETAMINE ASPARTATE, DEXTROAMPHETAMINE SULFATE AND AMPHETAMINE SULFATE 3.75; 3.75; 3.75; 3.75 MG/1; MG/1; MG/1; MG/1
15 TABLET ORAL
Qty: 30 TAB | Refills: 0 | Status: SHIPPED | OUTPATIENT
Start: 2021-02-22 | End: 2021-02-19 | Stop reason: SDUPTHER

## 2021-02-19 RX ORDER — DEXTROAMPHETAMINE SACCHARATE, AMPHETAMINE ASPARTATE, DEXTROAMPHETAMINE SULFATE AND AMPHETAMINE SULFATE 3.75; 3.75; 3.75; 3.75 MG/1; MG/1; MG/1; MG/1
15 TABLET ORAL
Qty: 30 TAB | Refills: 0 | Status: SHIPPED | OUTPATIENT
Start: 2021-03-13 | End: 2021-06-11 | Stop reason: SDUPTHER

## 2021-02-19 RX ORDER — DEXTROAMPHETAMINE SACCHARATE, AMPHETAMINE ASPARTATE MONOHYDRATE, DEXTROAMPHETAMINE SULFATE AND AMPHETAMINE SULFATE 7.5; 7.5; 7.5; 7.5 MG/1; MG/1; MG/1; MG/1
30 CAPSULE, EXTENDED RELEASE ORAL DAILY
Qty: 30 CAP | Refills: 0 | Status: SHIPPED | OUTPATIENT
Start: 2021-02-22 | End: 2021-06-11 | Stop reason: SDUPTHER

## 2021-02-19 NOTE — PROGRESS NOTES
Virtual Visit    Chief Complaint   Patient presents with    Medication Refill       1. Have you been to the ER, urgent care clinic since your last visit? Hospitalized since your last visit? No    2. Have you seen or consulted any other health care providers outside of the [address] [place] [address] since your last visit? Include any pap smears or colon screening.  No    Health Maintenance Due   Topic Date Due    Hepatitis C Screening  1995    DTaP/Tdap/Td series (1 - Tdap) 02/16/2014    Flu Vaccine (1) 09/01/2020       Learning Assessment 11/1/2018   PRIMARY LEARNER Patient   HIGHEST LEVEL OF EDUCATION - PRIMARY LEARNER  SOME COLLEGE   BARRIERS PRIMARY LEARNER NONE   CO-LEARNER CAREGIVER No   PRIMARY LANGUAGE ENGLISH   LEARNER PREFERENCE PRIMARY VIDEOS   ANSWERED BY patient   RELATIONSHIP SELF

## 2021-02-19 NOTE — PROGRESS NOTES
Yazan Hancock (: 1995) is a 27 y.o. male, established patient, here for evaluation of the following chief complaint(s)--see below:    Yazan Hancock is a 27 y.o. male who was seen by synchronous (real-time) audio-video technology on 2021. Consent: Yazan Hancock, who was seen by synchronous (real-time) audio-video technology, and/or his healthcare decision maker, is aware that this patient-initiated, Telehealth encounter on 2021 is a billable service, with coverage as determined by his insurance carrier. He is aware that he may receive a bill and has provided verbal consent to proceed: Yes. I was in the office while conducting this encounter. Assessment & Plan:   Diagnoses and all orders for this visit:      ICD-10-CM ICD-9-CM    1. Attention deficit disorder, unspecified hyperactivity presence  F98.8 314.00 amphetamine-dextroamphetamine XR (ADDERALL XR) 30 mg XR capsule      dextroamphetamine-amphetamine (ADDERALL) 15 mg tablet      dextroamphetamine-amphetamine (ADDERALL) 15 mg tablet      dextroamphetamine-amphetamine (ADDERALL) 15 mg tablet      amphetamine-dextroamphetamine XR (ADDERALL XR) 30 mg XR capsule      amphetamine-dextroamphetamine XR (ADDERALL XR) 30 mg XR capsule      DISCONTINUED: dextroamphetamine-amphetamine (ADDERALL) 15 mg tablet      DISCONTINUED: dextroamphetamine-amphetamine (ADDERALL) 15 mg tablet      DISCONTINUED: dextroamphetamine-amphetamine (ADDERALL) 15 mg tablet       Refills reviewed for XR and IR Adderall as above. Note:  Sent iniital 15mg scripts/refills with incorrect fill dates. (This is reason for discontinued 15mg scripts above.)  Included \"Note to Pharmacy: Replaces script for 15mg tab sent earlier today. \"  Called pharmacy to clarify after re-sent correct dates and spoke with pharamcist to confirm cancelled incorrect dates. He cancelled initial 15mg scripts x 3 and kept correct dates (x 3 also).     Scripts for 30mg dose sent with correct fill dates.          Follow-up and Dispositions    · Return in about 3 months (around 5/19/2021) for medication follow-up.       reviewed diet, exercise and weight control  reviewed medications and side effects in detail    Plan and evaluation (above) reviewed with pt at visit  Patient voiced understanding of plan and provided with time to ask/review questions. After Visit Summary (AVS) provided to pt after visit with additional instructions as needed/reviewed. AVS:  [x]  [facility] signed. []  Mailed to patient after visit. []  Not sent to patient after visit. No future appointments. --Updated future visits after patient check-out. Subjective:   Crissy Simental was seen for:  Chief Complaint   Patient presents with    Medication Refill       Notes:    Notes no problems with ADHD medications.     Prescription Monitoring Program ([place]) database query with fills:  Gretta Ask Date ID   Written Drug Qty Days Prescriber Rx # Pharmacy Refill   Daily Dose* Pymt Type      02/11/2021  1   01/09/2021  Dextroamp-Amphetamin 15 MG Tab  30.00  30 Cl [facility]   [id number]   Vir (0872)   [place]   [facility]   [facility]21  1   11/23/2020  Dextroamp-Amphet Er 30 MG Cap  30.00  30 Cl [facility]   [id number]   Vir (0480)   [place]   [facility]   01/12/2021  1   01/09/2021  Dextroamp-Amphetamin 15 MG Tab  30.00  30 Cl [facility]   [id number]   Vir (0872)   0   [facility]   12/24/2020  1   11/23/2020  Dextroamp-Amphet Er 30 MG Cap  30.00  30 Cl [facility]   [id number]   Vir (0480)   0   [facility]   12/02/2020  1   11/23/2020  Dextroamp-Amphetamin 15 MG Tab  30.00  30 Cl [facility]   [id number]   Vir (0480)   [place]   [facility]   11/24/2020  1   11/23/2020  Dextroamp-Amphet Er 30 MG Cap  30.00  30 Cl [facility]   [id number]   Vir (0480)   0   Private Pay            Wt Readings from Last 3 Encounters:   08/28/19 121 lb (54.9 kg)   04/30/19 137 lb (62.1 kg)   01/29/19 126 lb (57.2 kg)     BP Readings from Last 3 Encounters:   08/28/19 119/82   04/30/19 101/69   01/29/19 104/71     Pulse Readings from Last 3 Encounters:   08/28/19 (!) 104   04/30/19 87   01/29/19 93       Refills reviewed as below. Nursing screenings reviewed by provider at visit. No Known Allergies    Prior to Admission medications    Medication Sig Start Date End Date Taking? Authorizing Provider   amphetamine-dextroamphetamine XR (ADDERALL XR) 30 mg XR capsule Take 1 Cap by mouth daily. Max Daily Amount: 30 mg. 1/23/21  Yes Pedro Espinoza MD   dextroamphetamine-amphetamine (ADDERALL) 15 mg tablet Take 1 Tab by mouth daily as needed (ADHD). With evening classes/work 12/2/20  Yes Pedro Espinoza MD   fluticasone [facility]) 50 mcg/actuation nasal spray 2 Sprays by Both Nostrils route daily. 6/5/17  Yes Dolores Nielsen MD   fexofenadine (ALLEGRA) 180 mg tablet Take 1 Tab by mouth daily. 6/5/17  Yes Dolores Nielsen MD   Omeprazole delayed release (PRILOSEC D/R) 20 mg tablet Take 1 Tab by mouth daily. 1/6/16  Yes Dolores Nielsen MD   cyanocobalamin (B-12 DOTS) 500 mcg tablet Take 1 Tab by mouth daily. 6/2/15  Yes Dolores Nielsen MD   cholecalciferol (VITAMIN D3) 1,000 unit tablet Take 1 Tab by mouth daily. 6/2/15  Yes Dolores Nielsen MD   amphetamine-dextroamphetamine XR (ADDERALL XR) 30 mg XR capsule Take 1 Cap by mouth daily. Max Daily Amount: 30 mg. 1/23/21   Pedro Espinoza MD   dextroamphetamine-amphetamine (ADDERALL) 15 mg tablet Take 1 Tab by mouth daily as needed (ADHD). With evening classes/work 1/9/21   Pedro Espinoza MD   dextroamphetamine-amphetamine (ADDERALL) 15 mg tablet Take 1 Tab by mouth daily as needed (attention). With evening classes/work 2/8/21   Pedro Espinoza MD   amphetamine-dextroamphetamine XR (ADDERALL XR) 30 mg XR capsule Take 1 Cap by mouth daily. Max Daily Amount: 30 mg. 12/24/20   Pedro Espinoza MD   ALPRAZolam Calli Daya) 0.25 mg tablet Take 1 Tab by mouth nightly as needed for Anxiety. Max Daily Amount: 0.25 mg. Patient not taking: Reported on 8/26/2020 4/30/19   MD JON Lezama    PHYSICAL EXAMINATION:    Vital Signs: There were no vitals taken for this visit. Patient-Reported Vitals 2/19/2021   Patient-Reported Temperature 97.6        Constitutional: [x] Appears well-developed and well-nourished [x] No apparent distress      Mental status: [x] Alert and awake  [x] Oriented [x] Able to follow commands       Eyes:   EOM    [x]  Normal      Sclera  [x]  Normal              Discharge [x]  None visible       HENT: [x] Normocephalic, atraumatic    [x] Mouth/Throat: Mucous membranes are moist    External Ears [x] Normal      Neck: [x] No visualized mass     Pulmonary/Chest: [x] Respiratory effort normal   [x] No visualized signs of difficulty breathing or respiratory distress    Musculoskeletal:  [x] Normal range of motion of neck    Neurological:        [x] No Facial Asymmetry (Cranial nerve 7 motor function) (limited exam due to video visit)          [x] No gaze palsy     Skin:        [x] No significant exanthematous lesions or discoloration noted on facial skin             Psychiatric:       [x] Normal Affect       Other pertinent observable physical exam findings:  None. We discussed the expected course, resolution and complications of the diagnosis(es) in detail. Medication risks, benefits, costs, interactions, and alternatives were discussed as indicated. I advised him to contact the office if his condition worsens, changes or fails to improve as anticipated. He expressed understanding with the diagnosis(es) and plan. Yazan Hancock is a 27 y.o. male who was evaluated by a video visit encounter for concerns as above. Yazan Hancock is being evaluated by a Virtual Visit (video visit) encounter to address concerns as mentioned above. A caregiver was present when appropriate.   Due to this being a TeleHealth encounter (During ZMXY-92 public health emergency), evaluation of the following organ systems was limited: Vitals/Constitutional/EENT/Resp/CV/GI//MS/Neuro/Skin/Heme-Lymph-Imm. Pursuant to the emergency declaration under the [facility], [address] [address] and the [employer] Act, this Virtual Visit was conducted with patient's (and/or legal guardian's) consent, to reduce the patient's risk of exposure to COVID-19 and provide necessary medical care. The patient (and/or legal guardian) has also been advised to contact this office for worsening conditions or problems, and seek emergency medical treatment and/or call 911 if deemed necessary. Patient identification was verified at the start of the visit: YES. Services were provided through a video synchronous discussion virtually to substitute for in-person clinic visit. Patient was located at their individual home (or other location as per patient preference). Provider was located in medical office. An electronic signature was used to authenticate this note.   -- Jonathan Del Valle MD denies

## 2021-06-11 ENCOUNTER — OFFICE VISIT (OUTPATIENT)
Dept: INTERNAL MEDICINE CLINIC | Age: 26
End: 2021-06-11

## 2021-06-11 VITALS
OXYGEN SATURATION: 98 % | DIASTOLIC BLOOD PRESSURE: 78 MMHG | BODY MASS INDEX: 18.66 KG/M2 | SYSTOLIC BLOOD PRESSURE: 111 MMHG | TEMPERATURE: 97.8 F | RESPIRATION RATE: 14 BRPM | HEART RATE: 108 BPM | HEIGHT: 65 IN | WEIGHT: 112 LBS

## 2021-06-11 DIAGNOSIS — F98.8 ATTENTION DEFICIT DISORDER, UNSPECIFIED HYPERACTIVITY PRESENCE: Primary | ICD-10-CM

## 2021-06-11 DIAGNOSIS — E55.9 VITAMIN D DEFICIENCY: ICD-10-CM

## 2021-06-11 DIAGNOSIS — E53.8 B12 DEFICIENCY: ICD-10-CM

## 2021-06-11 PROCEDURE — 99214 OFFICE O/P EST MOD 30 MIN: CPT | Performed by: INTERNAL MEDICINE

## 2021-06-11 RX ORDER — DEXTROAMPHETAMINE SACCHARATE, AMPHETAMINE ASPARTATE, DEXTROAMPHETAMINE SULFATE AND AMPHETAMINE SULFATE 3.75; 3.75; 3.75; 3.75 MG/1; MG/1; MG/1; MG/1
15 TABLET ORAL
Qty: 30 TABLET | Refills: 0 | Status: SHIPPED | OUTPATIENT
Start: 2021-07-11 | End: 2021-10-14 | Stop reason: SDUPTHER

## 2021-06-11 RX ORDER — DEXTROAMPHETAMINE SACCHARATE, AMPHETAMINE ASPARTATE, DEXTROAMPHETAMINE SULFATE AND AMPHETAMINE SULFATE 3.75; 3.75; 3.75; 3.75 MG/1; MG/1; MG/1; MG/1
15 TABLET ORAL
Qty: 30 TABLET | Refills: 0 | Status: SHIPPED | OUTPATIENT
Start: 2021-06-11 | End: 2021-10-14 | Stop reason: SDUPTHER

## 2021-06-11 RX ORDER — DEXTROAMPHETAMINE SACCHARATE, AMPHETAMINE ASPARTATE MONOHYDRATE, DEXTROAMPHETAMINE SULFATE AND AMPHETAMINE SULFATE 7.5; 7.5; 7.5; 7.5 MG/1; MG/1; MG/1; MG/1
30 CAPSULE, EXTENDED RELEASE ORAL DAILY
Qty: 30 CAPSULE | Refills: 0 | Status: SHIPPED | OUTPATIENT
Start: 2021-08-22 | End: 2021-09-09 | Stop reason: SDUPTHER

## 2021-06-11 RX ORDER — DEXTROAMPHETAMINE SACCHARATE, AMPHETAMINE ASPARTATE MONOHYDRATE, DEXTROAMPHETAMINE SULFATE AND AMPHETAMINE SULFATE 7.5; 7.5; 7.5; 7.5 MG/1; MG/1; MG/1; MG/1
30 CAPSULE, EXTENDED RELEASE ORAL DAILY
Qty: 30 CAPSULE | Refills: 0 | Status: SHIPPED | OUTPATIENT
Start: 2021-06-23 | End: 2021-10-14 | Stop reason: SDUPTHER

## 2021-06-11 RX ORDER — DEXTROAMPHETAMINE SACCHARATE, AMPHETAMINE ASPARTATE MONOHYDRATE, DEXTROAMPHETAMINE SULFATE AND AMPHETAMINE SULFATE 7.5; 7.5; 7.5; 7.5 MG/1; MG/1; MG/1; MG/1
30 CAPSULE, EXTENDED RELEASE ORAL DAILY
Qty: 30 CAPSULE | Refills: 0 | Status: SHIPPED | OUTPATIENT
Start: 2021-07-23 | End: 2021-10-14 | Stop reason: SDUPTHER

## 2021-06-11 RX ORDER — DEXTROAMPHETAMINE SACCHARATE, AMPHETAMINE ASPARTATE, DEXTROAMPHETAMINE SULFATE AND AMPHETAMINE SULFATE 3.75; 3.75; 3.75; 3.75 MG/1; MG/1; MG/1; MG/1
15 TABLET ORAL
Qty: 30 TABLET | Refills: 0 | Status: SHIPPED | OUTPATIENT
Start: 2021-08-10 | End: 2021-09-09 | Stop reason: SDUPTHER

## 2021-06-11 NOTE — PROGRESS NOTES
Annette Lantigua (: 1995) is a 27 y.o. male, established patient, here for evaluation of the following chief complaint(s):  Chief Complaint   Patient presents with    Medication Evaluation     follow up        Assessment and Plan:       ICD-10-CM ICD-9-CM    1. Attention deficit disorder, unspecified hyperactivity presence  F98.8 314.00 amphetamine-dextroamphetamine XR (ADDERALL XR) 30 mg XR capsule      dextroamphetamine-amphetamine (ADDERALL) 15 mg tablet      dextroamphetamine-amphetamine (ADDERALL) 15 mg tablet      dextroamphetamine-amphetamine (ADDERALL) 15 mg tablet      amphetamine-dextroamphetamine XR (ADDERALL XR) 30 mg XR capsule      amphetamine-dextroamphetamine XR (ADDERALL XR) 30 mg XR capsule   2. Vitamin D deficiency  E55.9 268.9    3. B12 deficiency  E53.8 266.2        1. Refills reviewed at visit. Doing well on current doses. Timing of refills reviewed, based on prior  review below. 2,3:  Monitoring with fasting labs at follow-up reviewed. Prefers updated lipids, and will try to fast for next visit as below. Lab Results   Component Value Date/Time    Cholesterol, total 108 2015 10:34 AM    HDL Cholesterol 29 (L) 2015 10:34 AM    LDL, calculated 56 2015 10:34 AM    VLDL, calculated 23 2015 10:34 AM    Triglyceride 116 (H) 2015 10:34 AM       Follow-up and Dispositions    · Return in about 3 months (around 2021) for medication follow-up, fasting labs. lab results and schedule of future lab studies reviewed with patient  reviewed medications and side effects in detail    For additional documentation of information and/or recommendations discussed this visit, please see notes in instructions. Plan and evaluation (above) reviewed with pt at visit  Patient voiced understanding of plan and provided with time to ask/review questions.   After Visit Summary (AVS) provided to pt after visit with additional instructions as needed/reviewed. No future appointments. --Updated future visits after patient check-out. History of Present Illness:     Notes (nursing/rooming note copied below in italics):  Patient not fasting       Notes no problems with ADHD medications. Prescription Monitoring Program ([place]) database query with fills:  Annelise Favorite Date ID   Written Drug Qty Days Prescriber Rx # Pharmacy Refill   Daily Dose* Pymt Type      05/23/2021  1   01/09/2021  Dextroamp-Amphet Er 30 MG Cap  30.00  30 Cl [facility]   [id number]   Vir (0872)   [place]   [facility]   [facility]12/2021  1   02/19/2021  Dextroamp-Amphetamin 15 MG Tab  30.00  30 Cl [facility]   [id number]   Vir (0480)   0   [facility]   [place]   04/23/2021  1   02/19/2021  Dextroamp-Amphet Er 30 MG Cap  30.00  30 Cl Chi   [id number]   Vir (0480)   0   [facility]   04/12/2021  1   02/19/2021  Dextroamp-Amphetamin 15 MG Tab  30.00  30 Cl Chi   [id number]   Vir (0480)   0   [facility] [place]   03/24/2021  1   02/19/2021  Dextroamp-Amphet Er 30 MG Cap  30.00  30 Cl Chi   [id number]   Vir (0480)   0   [facility] [place]   03/13/2021  1   02/19/2021  Dextroamp-Amphetamin 15 MG Tab  30.00  30 Cl Chi   [id number]   Vir (0480)   0   [facility]   02/22/2021  1   02/19/2021  Dextroamp-Amphet Er 30 MG Cap  30.00  30 Cl Chi   [id number]   Vir (0480)   0   [facility] [place]   [facility]   02/11/2021  1   01/09/2021  Dextroamp-Amphetamin 15 MG Tab  30.00  30 Cl [facility]   [id number]   Vir (0872)   0   [facility] [place]   [facility]         Wt Readings from Last 3 Encounters:   06/11/21 112 lb (50.8 kg)   08/28/19 121 lb (54.9 kg)   04/30/19 137 lb (62.1 kg)     No problems noted with appetite. Very active during summer. BP Readings from Last 3 Encounters:   06/11/21 111/78   08/28/19 119/82   04/30/19 101/69     Pulse Readings from Last 3 Encounters:   06/11/21 (!) 108   08/28/19 (!) 104   04/30/19 87     HR during visit 112. He notes anxious in medical offices generally. Refills reviewed as below.         Reviewed prior lipids, and Vit D and B12 monitoring on supplements. He prefers to fast for follow-up visit, do in-office (not virtually) and repeat CMP, H18, B12 and Vit D with those fasting lipids. Nursing screenings reviewed by provider at visit. Prior to Admission medications    Medication Sig Start Date End Date Taking? Authorizing Provider   amphetamine-dextroamphetamine XR (ADDERALL XR) 30 mg XR capsule Take 1 Cap by mouth daily. Max Daily Amount: 30 mg. 4/23/21  Yes Lucrecia Angeles MD   dextroamphetamine-amphetamine (ADDERALL) 15 mg tablet Take 1 Tab by mouth daily as needed (ADHD). With evening classes/work. 5/12/21  Yes Lucrecia Angeles MD   dextroamphetamine-amphetamine (ADDERALL) 15 mg tablet Take 1 Tab by mouth daily as needed (ADHD). With evening classes/work. 4/12/21  Yes Lucrecia Angeles MD   dextroamphetamine-amphetamine (ADDERALL) 15 mg tablet Take 1 Tab by mouth daily as needed (attention). With evening classes/work 3/13/21  Yes Lucrecia Angeles MD   amphetamine-dextroamphetamine XR (ADDERALL XR) 30 mg XR capsule Take 1 Cap by mouth daily. Max Daily Amount: 30 mg. 3/24/21  Yes Lucrecia Angeles MD   amphetamine-dextroamphetamine XR (ADDERALL XR) 30 mg XR capsule Take 1 Cap by mouth daily. Max Daily Amount: 30 mg. 2/22/21  Yes Lucrecia Angeles MD   fluticasone [facility]) 50 mcg/actuation nasal spray 2 Sprays by Both Nostrils route daily. 6/5/17  Yes Libzeth Villareal MD   fexofenadine (ALLEGRA) 180 mg tablet Take 1 Tab by mouth daily. 6/5/17  Yes Lizbeth Villareal MD   cyanocobalamin (B-12 DOTS) 500 mcg tablet Take 1 Tab by mouth daily. 6/2/15  Yes Lizbeth Villareal MD   cholecalciferol (VITAMIN D3) 1,000 unit tablet Take 1 Tab by mouth daily. 6/2/15  Yes Lizbeth Villareal MD   ALPRAZolam Clorinda Nalini) 0.25 mg tablet Take 1 Tab by mouth nightly as needed for Anxiety. Max Daily Amount: 0.25 mg.   Patient not taking: Reported on 8/26/2020 4/30/19   Lizbeth Villareal MD   Omeprazole delayed release (PRILOSEC D/R) 20 mg tablet Take 1 Tab by mouth daily. Patient not taking: Reported on 6/11/2021 1/6/16   MD JON Valdes    Vitals:    06/11/21 1056   BP: 111/78   Pulse: (!) 108   Resp: 14   Temp: 97.8 °F (36.6 °C)   TempSrc: Oral   SpO2: 98%   Weight: 112 lb (50.8 kg)   Height: 5' 5\" (1.651 m)   PainSc:   0 - No pain      Body mass index is 18.64 kg/m². Physical Exam:     Physical Exam  Vitals and nursing note reviewed. Constitutional:       General: He is not in acute distress. Appearance: Normal appearance. He is well-developed. He is not diaphoretic. HENT:      Head: Normocephalic and atraumatic. Eyes:      General: No scleral icterus. Right eye: No discharge. Left eye: No discharge. Conjunctiva/sclera: Conjunctivae normal.   Cardiovascular:      Rate and Rhythm: Normal rate and regular rhythm. Pulses: Normal pulses. Heart sounds: Normal heart sounds. No murmur heard. No friction rub. No gallop. Pulmonary:      Effort: Pulmonary effort is normal. No respiratory distress. Breath sounds: Normal breath sounds. No stridor. No wheezing, rhonchi or rales. Abdominal:      General: Bowel sounds are normal. There is no distension. Palpations: Abdomen is soft. Tenderness: There is no abdominal tenderness. There is no guarding or rebound. Musculoskeletal:         General: No swelling, tenderness or deformity. Right lower leg: No edema. Left lower leg: No edema. Skin:     General: Skin is warm. Coloration: Skin is not jaundiced or pale. Findings: No bruising, erythema or rash. Neurological:      General: No focal deficit present. Mental Status: He is alert. Motor: No abnormal muscle tone. Coordination: Coordination normal.      Gait: Gait normal.   Psychiatric:         Mood and Affect: Mood normal.         Behavior: Behavior normal.         Thought Content:  Thought content normal. Judgment: Judgment normal.         An electronic signature was used to authenticate this note.   -- Kashif Mead MD

## 2021-06-11 NOTE — PATIENT INSTRUCTIONS
If not fasting at your next visit, we can do all the labs reviewed except your cholesterol.  
 
The vitamin D and B12 monitoring do not require you to fast.

## 2021-06-11 NOTE — PROGRESS NOTES
RM 17    Patient not fasting     Chief Complaint   Patient presents with    Medication Evaluation     follow up        1. Have you been to the ER, urgent care clinic since your last visit? Hospitalized since your last visit? No    2. Have you seen or consulted any other health care providers outside of the [address] [place] [address] since your last visit? Include any pap smears or colon screening. No    Health Maintenance Due   Topic Date Due    Hepatitis C Screening  Never done    COVID-19 Vaccine (1) Never done    DTaP/Tdap/Td series (1 - Tdap) Never done       Abuse Screening Questionnaire 6/11/2021   Do you ever feel afraid of your partner? N   Are you in a relationship with someone who physically or mentally threatens you? N   Is it safe for you to go home?  Y       3 most recent PHQ Screens 6/11/2021   Little interest or pleasure in doing things Not at all   Feeling down, depressed, irritable, or hopeless Not at all   Total Score PHQ 2 0       Learning Assessment 11/1/2018   PRIMARY LEARNER Patient   HIGHEST LEVEL OF EDUCATION - PRIMARY LEARNER  SOME COLLEGE   BARRIERS PRIMARY LEARNER NONE   CO-LEARNER CAREGIVER No   PRIMARY LANGUAGE ENGLISH   LEARNER PREFERENCE PRIMARY VIDEOS   ANSWERED BY patient   RELATIONSHIP SELF

## 2021-09-09 DIAGNOSIS — F98.8 ATTENTION DEFICIT DISORDER, UNSPECIFIED HYPERACTIVITY PRESENCE: ICD-10-CM

## 2021-09-09 NOTE — TELEPHONE ENCOUNTER
----- Message from [facility] sent at 9/9/2021  8:10 AM EDT -----  Regarding: Dr. Stephany Deal  Medication Refill    Caller (if not patient):NA      Relationship of caller (if not patient):NA      Best contact number(s):[phone]      Name of medication and dosage if known:\"ADDERALL 15 MG IR ADDERALL 30MG xr\"      Is patient out of this medication (yes/no): Miguel Choi 9881 listed in chart? (yes/no): Y  Pharmacy phone number:      Details to clarify the request:Want to know can he get temp. Refill till saleem on 10/14/2021 @.       [facility]

## 2021-09-10 RX ORDER — DEXTROAMPHETAMINE SACCHARATE, AMPHETAMINE ASPARTATE, DEXTROAMPHETAMINE SULFATE AND AMPHETAMINE SULFATE 3.75; 3.75; 3.75; 3.75 MG/1; MG/1; MG/1; MG/1
15 TABLET ORAL
Qty: 30 TABLET | Refills: 0 | Status: SHIPPED | OUTPATIENT
Start: 2021-09-10 | End: 2021-10-14 | Stop reason: SDUPTHER

## 2021-09-10 RX ORDER — DEXTROAMPHETAMINE SACCHARATE, AMPHETAMINE ASPARTATE MONOHYDRATE, DEXTROAMPHETAMINE SULFATE AND AMPHETAMINE SULFATE 7.5; 7.5; 7.5; 7.5 MG/1; MG/1; MG/1; MG/1
30 CAPSULE, EXTENDED RELEASE ORAL DAILY
Qty: 30 CAPSULE | Refills: 0 | Status: SHIPPED | OUTPATIENT
Start: 2021-09-21 | End: 2021-10-14 | Stop reason: SDUPTHER

## 2021-09-10 NOTE — TELEPHONE ENCOUNTER
Refill request(s) approved--Adderall--both 15mg IR and 30mg Ext-Release. --30mg dose due to refill on 9-21-21.  --15mg dose due to refill today/yesterday. Prescription Monitoring Program ([place]) database query with fills:  08/22/2021  1   06/11/2021  Dextroamp-Amphet Er 30 MG Cap  30.00  30 Cl Chi   [id number]   Vir (0480)   0   Private Pay   [place]   08/10/2021  1   06/11/2021  Dextroamp-Amphetamin 15 MG Tab  30.00  30 Cl Chi   [id number]   Vir (0480)   0   Private Pay   [place]   07/23/2021  1   06/11/2021  Dextroamp-Amphet Er 30 MG Cap  30.00  30 Cl [facility]   [id number]   Vir (0480)   [place]   [facility] [place]   [facility]   07/11/2021  1   06/11/2021  Dextroamp-Amphetamin 15 MG Tab  30.00  30 Cl Chi   [id number]   Vir (0480)   0   [facility] [place]   [facility]   06/23/2021  1   06/11/2021  Dextroamp-Amphet Er 30 MG Cap  30.00  30 Cl Chi   [id number]   Vir (0480)   0   [facility] [place]   06/11/2021  1   06/11/2021  Dextroamp-Amphetamin 15 MG Tab  30.00  30 Cl Chi   [id number]   Vir (0480)   0   [facility] [place]   [facility]       Requested Prescriptions     Signed Prescriptions Disp Refills    dextroamphetamine-amphetamine (ADDERALL) 15 mg tablet 30 Tablet 0     Sig: Take 1 Tablet by mouth daily as needed (ADHD). With evening classes/work. Authorizing Provider: Shira Moffett amphetamine-dextroamphetamine XR (ADDERALL XR) 30 mg XR capsule 30 Capsule 0     Sig: Take 1 Capsule by mouth daily. Max Daily Amount: 30 mg.      Authorizing Provider: Deja Suh       Future Appointments   Date Time Provider Nathalie Calle   10/14/2021 10:00 AM Yokasta Marcano MD [facility]

## 2021-10-08 DIAGNOSIS — F98.8 ATTENTION DEFICIT DISORDER, UNSPECIFIED HYPERACTIVITY PRESENCE: ICD-10-CM

## 2021-10-08 RX ORDER — DEXTROAMPHETAMINE SACCHARATE, AMPHETAMINE ASPARTATE, DEXTROAMPHETAMINE SULFATE AND AMPHETAMINE SULFATE 3.75; 3.75; 3.75; 3.75 MG/1; MG/1; MG/1; MG/1
15 TABLET ORAL
Qty: 30 TABLET | Refills: 0 | Status: CANCELLED | OUTPATIENT
Start: 2021-10-08

## 2021-10-08 NOTE — TELEPHONE ENCOUNTER
----- Message from [facility] sent at 10/8/2021  9:29 AM EDT -----  Regarding: /Refill  Medication Refill    Caller (if not patient):NA      Relationship of caller (if not patientNA      Best contact number(s):[phone]      Name of medication and dosage if known:\"Adderall 15 mg instant release\"      Is patient out of this medication (yes/no): Will be by Monday      Pharmacy name:[facility] [address] listed in chart? (yes/no): Y  Pharmacy phone [id number]:[phone]      Details to clarify the request:      [facility]

## 2021-10-08 NOTE — TELEPHONE ENCOUNTER
Last visit 06/11/2021 MD Castro Lang   Next appointment 10/14/2021 MD Castro Lang   Previous refill encounter(s)   09/10/2021 Adderall-IR #30     No access to      Requested Prescriptions     Pending Prescriptions Disp Refills    dextroamphetamine-amphetamine (ADDERALL) 15 mg tablet 30 Tablet 0     Sig: Take 1 Tablet by mouth daily as needed (ADHD). With evening classes/work.

## 2021-10-14 ENCOUNTER — OFFICE VISIT (OUTPATIENT)
Dept: INTERNAL MEDICINE CLINIC | Age: 26
End: 2021-10-14

## 2021-10-14 VITALS
BODY MASS INDEX: 20.16 KG/M2 | RESPIRATION RATE: 16 BRPM | TEMPERATURE: 97.7 F | SYSTOLIC BLOOD PRESSURE: 122 MMHG | OXYGEN SATURATION: 98 % | HEIGHT: 65 IN | HEART RATE: 87 BPM | WEIGHT: 121 LBS | DIASTOLIC BLOOD PRESSURE: 82 MMHG

## 2021-10-14 DIAGNOSIS — F98.8 ATTENTION DEFICIT DISORDER, UNSPECIFIED HYPERACTIVITY PRESENCE: Primary | ICD-10-CM

## 2021-10-14 DIAGNOSIS — E53.8 B12 DEFICIENCY: ICD-10-CM

## 2021-10-14 DIAGNOSIS — E55.9 VITAMIN D DEFICIENCY: ICD-10-CM

## 2021-10-14 PROCEDURE — 99214 OFFICE O/P EST MOD 30 MIN: CPT | Performed by: INTERNAL MEDICINE

## 2021-10-14 RX ORDER — DEXTROAMPHETAMINE SACCHARATE, AMPHETAMINE ASPARTATE, DEXTROAMPHETAMINE SULFATE AND AMPHETAMINE SULFATE 3.75; 3.75; 3.75; 3.75 MG/1; MG/1; MG/1; MG/1
15 TABLET ORAL
Qty: 30 TABLET | Refills: 0 | Status: SHIPPED | OUTPATIENT
Start: 2021-11-13 | End: 2022-01-14 | Stop reason: SDUPTHER

## 2021-10-14 RX ORDER — DEXTROAMPHETAMINE SACCHARATE, AMPHETAMINE ASPARTATE, DEXTROAMPHETAMINE SULFATE AND AMPHETAMINE SULFATE 3.75; 3.75; 3.75; 3.75 MG/1; MG/1; MG/1; MG/1
15 TABLET ORAL
Qty: 30 TABLET | Refills: 0 | Status: SHIPPED | OUTPATIENT
Start: 2021-10-14 | End: 2022-01-14 | Stop reason: SDUPTHER

## 2021-10-14 RX ORDER — DEXTROAMPHETAMINE SACCHARATE, AMPHETAMINE ASPARTATE, DEXTROAMPHETAMINE SULFATE AND AMPHETAMINE SULFATE 3.75; 3.75; 3.75; 3.75 MG/1; MG/1; MG/1; MG/1
15 TABLET ORAL
Qty: 30 TABLET | Refills: 0 | Status: SHIPPED | OUTPATIENT
Start: 2021-12-13 | End: 2022-01-14 | Stop reason: SDUPTHER

## 2021-10-14 RX ORDER — DEXTROAMPHETAMINE SACCHARATE, AMPHETAMINE ASPARTATE MONOHYDRATE, DEXTROAMPHETAMINE SULFATE AND AMPHETAMINE SULFATE 7.5; 7.5; 7.5; 7.5 MG/1; MG/1; MG/1; MG/1
30 CAPSULE, EXTENDED RELEASE ORAL DAILY
Qty: 30 CAPSULE | Refills: 0 | Status: SHIPPED | OUTPATIENT
Start: 2021-10-21 | End: 2022-01-14 | Stop reason: SDUPTHER

## 2021-10-14 RX ORDER — DEXTROAMPHETAMINE SACCHARATE, AMPHETAMINE ASPARTATE MONOHYDRATE, DEXTROAMPHETAMINE SULFATE AND AMPHETAMINE SULFATE 7.5; 7.5; 7.5; 7.5 MG/1; MG/1; MG/1; MG/1
30 CAPSULE, EXTENDED RELEASE ORAL DAILY
Qty: 30 CAPSULE | Refills: 0 | Status: SHIPPED | OUTPATIENT
Start: 2021-11-20 | End: 2022-01-14 | Stop reason: SDUPTHER

## 2021-10-14 RX ORDER — DEXTROAMPHETAMINE SACCHARATE, AMPHETAMINE ASPARTATE MONOHYDRATE, DEXTROAMPHETAMINE SULFATE AND AMPHETAMINE SULFATE 7.5; 7.5; 7.5; 7.5 MG/1; MG/1; MG/1; MG/1
30 CAPSULE, EXTENDED RELEASE ORAL DAILY
Qty: 30 CAPSULE | Refills: 0 | Status: SHIPPED | OUTPATIENT
Start: 2021-12-20 | End: 2022-01-14 | Stop reason: SDUPTHER

## 2021-10-14 NOTE — PROGRESS NOTES
Elke Peterson (: 1995) is a 27 y.o. male, established patient, here for evaluation of the following chief complaint(s):  Chief Complaint   Patient presents with    Medication Evaluation    Medication Refill       Assessment and Plan:       ICD-10-CM ICD-9-CM    1. Attention deficit disorder, unspecified hyperactivity presence  F98.8 314.00 amphetamine-dextroamphetamine XR (ADDERALL XR) 30 mg XR capsule      dextroamphetamine-amphetamine (ADDERALL) 15 mg tablet      dextroamphetamine-amphetamine (ADDERALL) 15 mg tablet      dextroamphetamine-amphetamine (ADDERALL) 15 mg tablet      amphetamine-dextroamphetamine XR (ADDERALL XR) 30 mg XR capsule      amphetamine-dextroamphetamine XR (ADDERALL XR) 30 mg XR capsule   2. Vitamin D deficiency  E55.9 268.9    3. B12 deficiency  E53.8 266.2        1. Refills reviewed at visit. Continue current doses/medications. 2,3:  Repeat levels with fasting labs as reviewed. He prefers to update lipids with labs above. Follow-up and Dispositions    · Return in about 3 months (around 2022) for medication follow-up, fasting labs. current treatment plan is effective, no change in therapy  lab results and schedule of future lab studies reviewed with patient  reviewed diet, exercise and weight control  reviewed medications and side effects in detail    For additional documentation of information and/or recommendations discussed this visit, please see notes in instructions. Plan and evaluation (above) reviewed with pt at visit  Patient voiced understanding of plan and provided with time to ask/review questions. After Visit Summary (AVS) provided to pt after visit with additional instructions as needed/reviewed. No future appointments. --Updated future visits after patient check-out. History of Present Illness:     Notes (nursing/rooming note copied below in italics):  As above2    Here for medication follow-up.       Notes no problems with ADHD medications. Prescription Monitoring Program ([place]) database query with fills:  Filled  Written  Sold  ID  Drug  QTY  Days  Prescriber  RX #  Dispenser  Refill  Daily Dose*  Pymt Type       09/21/2021  09/10/2021   1  Dextroamp-Amphet Er 30 Mg Cap 30.00  30  Cl Chi  [id number]  Vir (0480)  0   Private Pay  [place]     09/10/2021  09/10/2021   1  Dextroamp-Amphetamin 15 Mg Tab 30.00  30  Cl Chi  [id number]  Vir (0480)  0   Private Pay  [place]     08/22/2021 06/11/2021   1  Dextroamp-Amphet Er 30 Mg Cap 30.00  30  Cl Chi  [id number]  Vir (0480)  0   [facility] Pay  [place]     08/10/2021  06/11/2021   1  Dextroamp-Amphetamin 15 Mg Tab 30.00  30  Cl Chi  [id number]  Vir (0480)  0   [facility]     07/23/2021 06/11/2021   1  Dextroamp-Amphet Er 30 Mg Cap 30.00  30  Cl Chi  [id number]  Vir (0480)  0   [facility] Pay  [place]     07/11/2021 06/11/2021   1  Dextroamp-Amphetamin 15 Mg Tab 30.00  30  Cl Chi  [id number]  Vir (0480)  0   [facility]     06/23/2021 06/11/2021   1  Dextroamp-Amphet Er 30 Mg Cap 30.00  30  Cl Chi  [id number]  Vir (0480)  0   [facility]     06/11/2021 06/11/2021   1  Dextroamp-Amphetamin 15 Mg Tab 30.00  30  Cl Chi  [id number]  Vir (0480)  0             Wt Readings from Last 3 Encounters:   10/14/21 121 lb (54.9 kg)   06/11/21 112 lb (50.8 kg)   08/28/19 121 lb (54.9 kg)     BP Readings from Last 3 Encounters:   10/14/21 122/82   06/11/21 111/78   08/28/19 119/82     Pulse Readings from Last 3 Encounters:   10/14/21 87   06/11/21 (!) 108   08/28/19 (!) 104       Refills reviewed as below. Had planned fasting labs--to update lipids and Vit D and B12. He has not fasted, and prefers to update next visit. Reviewed and he plans to set reminder to fast, since appt reminder doesn't indicate need for fasting labs or non-fasting labs. He notes has not been good about taking his vitamins. Reviewed Vit D repletion if needed with follow-up labs.     Lab Results   Component Value Date/Time    VITAMIN D, 25-HYDROXY 9.3 (L) 06/01/2015 10:34 AM       Lab Results   Component Value Date/Time    Vitamin B12 218 06/01/2015 10:34 AM       Health Maintenance Due   Topic Date Due    Hepatitis C Screening  Never done    COVID-19 Vaccine (1) Never done    DTaP/Tdap/Td series (1 - Tdap) Never done    Flu Vaccine (1) Never done     He notes has had first COVID vaccine. Doesn't have info with him today. He is not interested in influenza vaccine at today's visit. Nursing screenings reviewed by provider at visit. Prior to Admission medications    Medication Sig Start Date End Date Taking? Authorizing Provider   dextroamphetamine-amphetamine (ADDERALL) 15 mg tablet Take 1 Tablet by mouth daily as needed (ADHD). With evening classes/work. 9/10/21  Yes Long Quezada MD   amphetamine-dextroamphetamine XR (ADDERALL XR) 30 mg XR capsule Take 1 Capsule by mouth daily. Max Daily Amount: 30 mg. 9/21/21  Yes Long Quezada MD   dextroamphetamine-amphetamine (ADDERALL) 15 mg tablet Take 1 Tablet by mouth daily as needed (ADHD). With evening classes/work. 7/11/21  Yes Long Quezada MD   dextroamphetamine-amphetamine (ADDERALL) 15 mg tablet Take 1 Tablet by mouth daily as needed (attention). With evening classes/work 6/11/21  Yes Long Quezada MD   amphetamine-dextroamphetamine XR (ADDERALL XR) 30 mg XR capsule Take 1 Capsule by mouth daily. Max Daily Amount: 30 mg. 7/23/21  Yes Long Quezada MD   amphetamine-dextroamphetamine XR (ADDERALL XR) 30 mg XR capsule Take 1 Capsule by mouth daily. Max Daily Amount: 30 mg. 6/23/21  Yes Long Quezada MD   fluticasone [facility]) 50 mcg/actuation nasal spray 2 Sprays by Both Nostrils route daily. 6/5/17  Yes Francesca Marrero MD   fexofenadine (ALLEGRA) 180 mg tablet Take 1 Tab by mouth daily. 6/5/17  Yes Francesca Marrero MD   cyanocobalamin (B-12 DOTS) 500 mcg tablet Take 1 Tab by mouth daily.  6/2/15  Yes Francesca Marrero MD cholecalciferol (VITAMIN D3) 1,000 unit tablet Take 1 Tab by mouth daily. 6/2/15  Yes Kishor Ibarra MD   ALPRAZolam Newton Chen) 0.25 mg tablet Take 1 Tab by mouth nightly as needed for Anxiety. Max Daily Amount: 0.25 mg. Patient not taking: Reported on 8/26/2020 4/30/19   Kishor Ibarra MD   Omeprazole delayed release (PRILOSEC D/R) 20 mg tablet Take 1 Tab by mouth daily. Patient not taking: Reported on 6/11/2021 1/6/16   Kishor Ibarra MD        ROS    Vitals:    10/14/21 1000   BP: 122/82   Pulse: 87   Resp: 16   Temp: 97.7 °F (36.5 °C)   TempSrc: Oral   SpO2: 98%   Weight: 121 lb (54.9 kg)   Height: 5' 5\" (1.651 m)   PainSc:   0 - No pain      Body mass index is 20.14 kg/m². Physical Exam:     Physical Exam  Vitals and nursing note reviewed. Constitutional:       General: He is not in acute distress. Appearance: Normal appearance. He is well-developed. He is not diaphoretic. HENT:      Head: Normocephalic and atraumatic. Eyes:      General: No scleral icterus. Right eye: No discharge. Left eye: No discharge. Conjunctiva/sclera: Conjunctivae normal.   Cardiovascular:      Rate and Rhythm: Normal rate and regular rhythm. Pulses: Normal pulses. Heart sounds: Normal heart sounds. No murmur heard. No friction rub. No gallop. Pulmonary:      Effort: Pulmonary effort is normal. No respiratory distress. Breath sounds: Normal breath sounds. No stridor. No wheezing, rhonchi or rales. Abdominal:      General: Bowel sounds are normal. There is no distension. Palpations: Abdomen is soft. Tenderness: There is no abdominal tenderness. There is no guarding or rebound. Musculoskeletal:         General: No swelling, tenderness or deformity. Right lower leg: No edema. Left lower leg: No edema. Skin:     General: Skin is warm. Coloration: Skin is not jaundiced or pale. Findings: No bruising, erythema or rash.    Neurological: General: No focal deficit present. Mental Status: He is alert. Motor: No abnormal muscle tone. Coordination: Coordination normal.      Gait: Gait normal.   Psychiatric:         Mood and Affect: Mood normal.         Behavior: Behavior normal.         Thought Content: Thought content normal.         Judgment: Judgment normal.         An electronic signature was used to authenticate this note.   -- Delmi Cheung MD

## 2021-10-14 NOTE — PROGRESS NOTES
RM 16    Chief Complaint   Patient presents with    Medication Evaluation    Medication Refill       1. Have you been to the ER, urgent care clinic since your last visit? Hospitalized since your last visit? No    2. Have you seen or consulted any other health care providers outside of the [address] [place] [address] since your last visit? Include any pap smears or colon screening. No    Health Maintenance Due   Topic Date Due    Hepatitis C Screening  Never done    COVID-19 Vaccine (1) Never done    DTaP/Tdap/Td series (1 - Tdap) Never done    Flu Vaccine (1) Never done       Abuse Screening Questionnaire 10/14/2021   Do you ever feel afraid of your partner? N   Are you in a relationship with someone who physically or mentally threatens you? N   Is it safe for you to go home?  Y       3 most recent PHQ Screens 10/14/2021   Little interest or pleasure in doing things Not at all   Feeling down, depressed, irritable, or hopeless Not at all   Total Score PHQ 2 0       Learning Assessment 11/1/2018   PRIMARY LEARNER Patient   HIGHEST LEVEL OF EDUCATION - PRIMARY LEARNER  SOME COLLEGE   BARRIERS PRIMARY LEARNER NONE   CO-LEARNER CAREGIVER No   PRIMARY LANGUAGE ENGLISH   LEARNER PREFERENCE PRIMARY VIDEOS   ANSWERED BY patient   RELATIONSHIP SELF

## 2021-10-14 NOTE — PATIENT INSTRUCTIONS
Please set a reminder to fast for your next visit and can update labs as reviewed. If you prefer to do lipids/cholesterol testing at your next visit with labs, please fast.    If not doing lipids/cholesterol testing, the other labs (including A1c), can be done non-fasting.

## 2022-01-12 ENCOUNTER — TELEPHONE (OUTPATIENT)
Dept: INTERNAL MEDICINE CLINIC | Age: 27
End: 2022-01-12

## 2022-01-12 NOTE — TELEPHONE ENCOUNTER
----- Message from [employer] sent at 1/10/2022  9:39 AM EST -----  Subject: Refill Request    QUESTIONS  Name of Medication? dextroamphetamine-amphetamine (ADDERALL) 15 mg tablet  Patient-reported dosage and instructions? 15 mg one tablet once daily  How many days do you have left? 2  Preferred Pharmacy? [facility]/PH[facility]ACY #[id number]  Pharmacy phone number (if available)? [phone]  Additional Information for Provider? Has appt. Needs refill until appt   12/14. 2 days left.  ---------------------------------------------------------------------------  --------------  CALL BACK INFO  What is the best way for the office to contact you? OK to leave message on   voicemail  Preferred Call Back Phone Number?  [phone]

## 2022-01-14 ENCOUNTER — OFFICE VISIT (OUTPATIENT)
Dept: INTERNAL MEDICINE CLINIC | Age: 27
End: 2022-01-14

## 2022-01-14 VITALS
SYSTOLIC BLOOD PRESSURE: 107 MMHG | BODY MASS INDEX: 19.77 KG/M2 | HEIGHT: 66 IN | HEART RATE: 81 BPM | DIASTOLIC BLOOD PRESSURE: 70 MMHG | OXYGEN SATURATION: 99 % | WEIGHT: 123 LBS | TEMPERATURE: 98.4 F

## 2022-01-14 DIAGNOSIS — E53.8 B12 DEFICIENCY: ICD-10-CM

## 2022-01-14 DIAGNOSIS — F98.8 ATTENTION DEFICIT DISORDER, UNSPECIFIED HYPERACTIVITY PRESENCE: Primary | ICD-10-CM

## 2022-01-14 DIAGNOSIS — E55.9 VITAMIN D DEFICIENCY: ICD-10-CM

## 2022-01-14 PROCEDURE — 99214 OFFICE O/P EST MOD 30 MIN: CPT | Performed by: INTERNAL MEDICINE

## 2022-01-14 RX ORDER — DEXTROAMPHETAMINE SACCHARATE, AMPHETAMINE ASPARTATE, DEXTROAMPHETAMINE SULFATE AND AMPHETAMINE SULFATE 3.75; 3.75; 3.75; 3.75 MG/1; MG/1; MG/1; MG/1
15 TABLET ORAL
Qty: 30 TABLET | Refills: 0 | Status: SHIPPED | OUTPATIENT
Start: 2022-01-14 | End: 2022-04-14 | Stop reason: SDUPTHER

## 2022-01-14 RX ORDER — DEXTROAMPHETAMINE SACCHARATE, AMPHETAMINE ASPARTATE MONOHYDRATE, DEXTROAMPHETAMINE SULFATE AND AMPHETAMINE SULFATE 7.5; 7.5; 7.5; 7.5 MG/1; MG/1; MG/1; MG/1
30 CAPSULE, EXTENDED RELEASE ORAL DAILY
Qty: 30 CAPSULE | Refills: 0 | Status: SHIPPED | OUTPATIENT
Start: 2022-03-22 | End: 2022-04-14 | Stop reason: SDUPTHER

## 2022-01-14 RX ORDER — DEXTROAMPHETAMINE SACCHARATE, AMPHETAMINE ASPARTATE MONOHYDRATE, DEXTROAMPHETAMINE SULFATE AND AMPHETAMINE SULFATE 7.5; 7.5; 7.5; 7.5 MG/1; MG/1; MG/1; MG/1
30 CAPSULE, EXTENDED RELEASE ORAL DAILY
Qty: 30 CAPSULE | Refills: 0 | Status: SHIPPED | OUTPATIENT
Start: 2022-02-20 | End: 2022-04-14 | Stop reason: SDUPTHER

## 2022-01-14 RX ORDER — DEXTROAMPHETAMINE SACCHARATE, AMPHETAMINE ASPARTATE MONOHYDRATE, DEXTROAMPHETAMINE SULFATE AND AMPHETAMINE SULFATE 7.5; 7.5; 7.5; 7.5 MG/1; MG/1; MG/1; MG/1
30 CAPSULE, EXTENDED RELEASE ORAL DAILY
Qty: 30 CAPSULE | Refills: 0 | Status: SHIPPED | OUTPATIENT
Start: 2022-01-21 | End: 2022-04-14 | Stop reason: SDUPTHER

## 2022-01-14 RX ORDER — DEXTROAMPHETAMINE SACCHARATE, AMPHETAMINE ASPARTATE, DEXTROAMPHETAMINE SULFATE AND AMPHETAMINE SULFATE 3.75; 3.75; 3.75; 3.75 MG/1; MG/1; MG/1; MG/1
15 TABLET ORAL
Qty: 30 TABLET | Refills: 0 | Status: SHIPPED | OUTPATIENT
Start: 2022-03-15 | End: 2022-04-14 | Stop reason: SDUPTHER

## 2022-01-14 RX ORDER — DEXTROAMPHETAMINE SACCHARATE, AMPHETAMINE ASPARTATE, DEXTROAMPHETAMINE SULFATE AND AMPHETAMINE SULFATE 3.75; 3.75; 3.75; 3.75 MG/1; MG/1; MG/1; MG/1
15 TABLET ORAL
Qty: 30 TABLET | Refills: 0 | Status: SHIPPED | OUTPATIENT
Start: 2022-02-13 | End: 2022-04-14 | Stop reason: SDUPTHER

## 2022-01-14 NOTE — PATIENT INSTRUCTIONS
If you do not want to repeat your cholesterol, you do not have to fast for next labs. If fasting will update cholesterol as planned previously. We can update your Tdap (Tetanus, diphtheria and acellular pertussis/\"whooping cough\") next visit as reviewed.

## 2022-01-14 NOTE — PROGRESS NOTES
RM 17    Chief Complaint   Patient presents with    Medication Evaluation       Visit Vitals  /70   Pulse 81   Temp 98.4 °F (36.9 °C)   Ht 5' 6\" (1.676 m)   Wt 123 lb (55.8 kg)   SpO2 99%   BMI 19.85 kg/m²       3 most recent PHQ Screens 1/14/2022   Little interest or pleasure in doing things Not at all   Feeling down, depressed, irritable, or hopeless Not at all   Total Score PHQ 2 0         1. Have you been to the ER, urgent care clinic since your last visit? Hospitalized since your last visit? No    2. Have you seen or consulted any other health care providers outside of the [address] [place] [address] since your last visit? Include any pap smears or colon screening. No    Health Maintenance Due   Topic Date Due    Hepatitis C Screening  Never done    COVID-19 Vaccine (1) Never done    DTaP/Tdap/Td series (1 - Tdap) Never done    Flu Vaccine (1) Never done       Learning Assessment 11/1/2018   PRIMARY LEARNER Patient   HIGHEST LEVEL OF EDUCATION - PRIMARY LEARNER  SOME COLLEGE   BARRIERS PRIMARY LEARNER NONE   CO-LEARNER CAREGIVER No   PRIMARY LANGUAGE ENGLISH   LEARNER PREFERENCE PRIMARY VIDEOS   ANSWERED BY patient   RELATIONSHIP SELF       AVS  education, follow up, and recommendations provided and addressed with patient.   services used to advise patient -no

## 2022-01-14 NOTE — PROGRESS NOTES
Whit Goyal (: 1995) is a 27 y.o. male, established patient, here for evaluation of the following chief complaint(s):  Chief Complaint   Patient presents with    Medication Evaluation       Assessment and Plan:       ICD-10-CM ICD-9-CM    1. Attention deficit disorder, unspecified hyperactivity presence  F98.8 314.00 amphetamine-dextroamphetamine XR (ADDERALL XR) 30 mg XR capsule      dextroamphetamine-amphetamine (ADDERALL) 15 mg tablet      dextroamphetamine-amphetamine (ADDERALL) 15 mg tablet      dextroamphetamine-amphetamine (ADDERALL) 15 mg tablet      amphetamine-dextroamphetamine XR (ADDERALL XR) 30 mg XR capsule      amphetamine-dextroamphetamine XR (ADDERALL XR) 30 mg XR capsule   2. Vitamin D deficiency  E55.9 268.9    3. B12 deficiency  E53.8 266.2        1. Refills reviewed at visit. 2,3:  Monitoring at follow-up reviewed. Plan lipids if fasting, or just above levels (with CMP, H18) if not fasting. Follow-up and Dispositions    · Return in about 3 months (around 2022) for medication follow-up, fasting labs, Tdap.       lab results and schedule of future lab studies reviewed with patient  reviewed medications and side effects in detail    For additional documentation of information and/or recommendations discussed this visit, please see notes in instructions. Plan and evaluation (above) reviewed with pt at visit  Patient voiced understanding of plan and provided with time to ask/review questions. After Visit Summary (AVS) provided to pt after visit with additional instructions as needed/reviewed. No future appointments. --Updated future visits after patient check-out. History of Present Illness:     Notes (nursing/rooming note copied below in italics):  As above      Notes no problems with ADHD medications.     Prescription Monitoring Program ([place]) database query with fills:  Filled  Written  Sold  ID  Drug  QTY  Days  Prescriber  RX #  Dispenser  Refill Daily Dose*  Pymt Type       12/22/2021  10/14/2021   1  Dextroamp-Amphet Er 30 Mg Cap   30.00  30  Cl Chi  [id number]  Vir (0480)  0   [facility]     12/13/2021  10/14/2021   1  Dextroamp-Amphetamin 15 Mg Tab   30.00  30  Cl Chi  [id number]  Vir (0480)  0   [facility]     11/20/2021  10/14/2021   1  Dextroamp-Amphet Er 30 Mg Cap   30.00  30  Cl Chi  [id number]  Vir (0480)  0   [facility]     11/13/2021  10/14/2021   1  Dextroamp-Amphetamin 15 Mg Tab   30.00  30  Cl Chi  [id number]  Vir (0480)  0   [facility]     10/21/2021  10/14/2021   1  Dextroamp-Amphet Er 30 Mg Cap   30.00  30  Cl Chi  [id number]  Vir (0480)  0   [facility]     10/14/2021  10/14/2021   1  Dextroamp-Amphetamin 15 Mg Tab   10.00  10  Cl Chi  [id number]  Vir (0480)  0   [facility]     10/14/2021  10/14/2021   1  Dextroamp-Amphetamin 15 Mg Tab   20.00  20  Cl Chi  [id number]  Vir (0480)  0   [facility]     09/21/2021  09/10/2021   1  Dextroamp-Amphet Er 30 Mg Cap   30.00  30  Cl Chi  [id number]  Vir (0480)  0             Wt Readings from Last 3 Encounters:   01/14/22 123 lb (55.8 kg)   10/14/21 121 lb (54.9 kg)   06/11/21 112 lb (50.8 kg)     BP Readings from Last 3 Encounters:   01/14/22 107/70   10/14/21 122/82   06/11/21 111/78     Pulse Readings from Last 3 Encounters:   01/14/22 81   10/14/21 87   06/11/21 (!) 108       Refills reviewed as below. Nursing screenings reviewed by provider at visit. Prior to Admission medications    Medication Sig Start Date End Date Taking? Authorizing Provider   amphetamine-dextroamphetamine XR (ADDERALL XR) 30 mg XR capsule Take 1 Capsule by mouth daily. Max Daily Amount: 30 mg. 12/20/21  Yes Pradip Slaughter MD   dextroamphetamine-amphetamine (ADDERALL) 15 mg tablet Take 1 Tablet by mouth daily as needed (ADHD). With evening classes/work. 12/13/21  Yes Pradip Slaughter MD   dextroamphetamine-amphetamine (ADDERALL) 15 mg tablet Take 1 Tablet by mouth daily as needed (ADHD). With evening classes/work. 11/13/21  Yes Laya Hampton MD   dextroamphetamine-amphetamine (ADDERALL) 15 mg tablet Take 1 Tablet by mouth daily as needed (attention). With evening classes/work 10/14/21  Yes Laya Hampton MD   amphetamine-dextroamphetamine XR (ADDERALL XR) 30 mg XR capsule Take 1 Capsule by mouth daily. Max Daily Amount: 30 mg. 11/20/21  Yes Laya Hampton MD   amphetamine-dextroamphetamine XR (ADDERALL XR) 30 mg XR capsule Take 1 Capsule by mouth daily. Max Daily Amount: 30 mg. 10/21/21  Yes Laya Hampton MD   fluticasone [facility]) 50 mcg/actuation nasal spray 2 Sprays by Both Nostrils route daily. 6/5/17  Yes Trudi Goodman MD   fexofenadine (ALLEGRA) 180 mg tablet Take 1 Tab by mouth daily. 6/5/17  Yes Trudi Goodman MD   Omeprazole delayed release (PRILOSEC D/R) 20 mg tablet Take 1 Tab by mouth daily. 1/6/16  Yes Trudi Goodman MD   cyanocobalamin (B-12 DOTS) 500 mcg tablet Take 1 Tab by mouth daily. 6/2/15  Yes Trudi Goodman MD   cholecalciferol (VITAMIN D3) 1,000 unit tablet Take 1 Tab by mouth daily. 6/2/15  Yes Trudi Goodman MD   ALPRAZolam Daryel De Jesus) 0.25 mg tablet Take 1 Tab by mouth nightly as needed for Anxiety. Max Daily Amount: 0.25 mg. Patient not taking: Reported on 8/26/2020 4/30/19   Trudi Goodman MD        ROS    Vitals:    01/14/22 1155   BP: 107/70   Pulse: 81   Temp: 98.4 °F (36.9 °C)   SpO2: 99%   Weight: 123 lb (55.8 kg)   Height: 5' 6\" (1.676 m)   PainSc:   0 - No pain      Body mass index is 19.85 kg/m². Physical Exam:     Physical Exam  Vitals and nursing note reviewed. Constitutional:       General: He is not in acute distress. Appearance: Normal appearance. He is well-developed. He is not diaphoretic. HENT:      Head: Normocephalic and atraumatic. Eyes:      General: No scleral icterus. Right eye: No discharge. Left eye: No discharge.       Conjunctiva/sclera: Conjunctivae normal. Cardiovascular:      Rate and Rhythm: Normal rate and regular rhythm. Pulses: Normal pulses. Heart sounds: Normal heart sounds. No murmur heard. No friction rub. No gallop. Pulmonary:      Effort: Pulmonary effort is normal. No respiratory distress. Breath sounds: Normal breath sounds. No stridor. No wheezing, rhonchi or rales. Abdominal:      General: Bowel sounds are normal. There is no distension. Palpations: Abdomen is soft. Tenderness: There is no abdominal tenderness. There is no guarding or rebound. Musculoskeletal:         General: No swelling, tenderness or deformity. Right lower leg: No edema. Left lower leg: No edema. Skin:     General: Skin is warm. Coloration: Skin is not jaundiced or pale. Findings: No bruising, erythema or rash. Neurological:      General: No focal deficit present. Mental Status: He is alert. Motor: No abnormal muscle tone. Coordination: Coordination normal.      Gait: Gait normal.   Psychiatric:         Mood and Affect: Mood normal.         Behavior: Behavior normal.         Thought Content: Thought content normal.         Judgment: Judgment normal.         An electronic signature was used to authenticate this note.   -- Marci Burt MD

## 2022-03-19 PROBLEM — F41.9 ANXIETY: Status: ACTIVE | Noted: 2019-08-29

## 2022-03-19 PROBLEM — J02.9 SORE THROAT: Status: ACTIVE | Noted: 2018-02-16

## 2022-03-20 PROBLEM — R63.4 WEIGHT LOSS, NON-INTENTIONAL: Status: ACTIVE | Noted: 2019-08-29

## 2022-04-14 ENCOUNTER — VIRTUAL VISIT (OUTPATIENT)
Dept: INTERNAL MEDICINE CLINIC | Age: 27
End: 2022-04-14

## 2022-04-14 DIAGNOSIS — F41.9 ANXIETY: ICD-10-CM

## 2022-04-14 DIAGNOSIS — F98.8 ATTENTION DEFICIT DISORDER, UNSPECIFIED HYPERACTIVITY PRESENCE: ICD-10-CM

## 2022-04-14 PROCEDURE — 99214 OFFICE O/P EST MOD 30 MIN: CPT | Performed by: INTERNAL MEDICINE

## 2022-04-14 RX ORDER — DEXTROAMPHETAMINE SACCHARATE, AMPHETAMINE ASPARTATE MONOHYDRATE, DEXTROAMPHETAMINE SULFATE AND AMPHETAMINE SULFATE 7.5; 7.5; 7.5; 7.5 MG/1; MG/1; MG/1; MG/1
30 CAPSULE, EXTENDED RELEASE ORAL DAILY
Qty: 30 CAPSULE | Refills: 0 | Status: SHIPPED | OUTPATIENT
Start: 2022-04-22 | End: 2022-07-19 | Stop reason: SDUPTHER

## 2022-04-14 RX ORDER — DEXTROAMPHETAMINE SACCHARATE, AMPHETAMINE ASPARTATE, DEXTROAMPHETAMINE SULFATE AND AMPHETAMINE SULFATE 3.75; 3.75; 3.75; 3.75 MG/1; MG/1; MG/1; MG/1
15 TABLET ORAL
Qty: 30 TABLET | Refills: 0 | Status: SHIPPED | OUTPATIENT
Start: 2022-05-14 | End: 2022-07-19 | Stop reason: SDUPTHER

## 2022-04-14 RX ORDER — DEXTROAMPHETAMINE SACCHARATE, AMPHETAMINE ASPARTATE MONOHYDRATE, DEXTROAMPHETAMINE SULFATE AND AMPHETAMINE SULFATE 7.5; 7.5; 7.5; 7.5 MG/1; MG/1; MG/1; MG/1
30 CAPSULE, EXTENDED RELEASE ORAL DAILY
Qty: 30 CAPSULE | Refills: 0 | Status: SHIPPED | OUTPATIENT
Start: 2022-06-21 | End: 2022-07-16 | Stop reason: SDUPTHER

## 2022-04-14 RX ORDER — DEXTROAMPHETAMINE SACCHARATE, AMPHETAMINE ASPARTATE, DEXTROAMPHETAMINE SULFATE AND AMPHETAMINE SULFATE 3.75; 3.75; 3.75; 3.75 MG/1; MG/1; MG/1; MG/1
15 TABLET ORAL
Qty: 30 TABLET | Refills: 0 | Status: SHIPPED | OUTPATIENT
Start: 2022-04-14 | End: 2022-07-13 | Stop reason: SDUPTHER

## 2022-04-14 RX ORDER — DEXTROAMPHETAMINE SACCHARATE, AMPHETAMINE ASPARTATE MONOHYDRATE, DEXTROAMPHETAMINE SULFATE AND AMPHETAMINE SULFATE 7.5; 7.5; 7.5; 7.5 MG/1; MG/1; MG/1; MG/1
30 CAPSULE, EXTENDED RELEASE ORAL DAILY
Qty: 30 CAPSULE | Refills: 0 | Status: SHIPPED | OUTPATIENT
Start: 2022-05-22 | End: 2022-07-19 | Stop reason: SDUPTHER

## 2022-04-14 RX ORDER — ALPRAZOLAM 0.25 MG/1
.25-.5 TABLET ORAL
Qty: 15 TABLET | Refills: 0 | Status: SHIPPED | OUTPATIENT
Start: 2022-04-14 | End: 2022-07-19 | Stop reason: SDUPTHER

## 2022-04-14 RX ORDER — DEXTROAMPHETAMINE SACCHARATE, AMPHETAMINE ASPARTATE, DEXTROAMPHETAMINE SULFATE AND AMPHETAMINE SULFATE 3.75; 3.75; 3.75; 3.75 MG/1; MG/1; MG/1; MG/1
15 TABLET ORAL
Qty: 30 TABLET | Refills: 0 | Status: SHIPPED | OUTPATIENT
Start: 2022-06-13 | End: 2022-07-19 | Stop reason: SDUPTHER

## 2022-04-14 NOTE — PROGRESS NOTES
Xanax refill going on trip soon with lots of flying  15mg adderrall refill    Chief Complaint   Patient presents with    Medication Evaluation     f/u     1. Have you been to the ER, urgent care clinic since your last visit? Hospitalized since your last visit? No    2. Have you seen or consulted any other health care providers outside of the [address] [place] [address] since your last visit? Include any pap smears or colon screening.  No

## 2022-04-14 NOTE — PROGRESS NOTES
Elizabeth Bhakta (: 1995) is a 32 y.o. male, established patient, here for evaluation of the following chief complaint(s)--see below:    Elizabeth Bhakta is a 32 y.o. male who was seen by synchronous (real-time) audio-video technology on 2022. Consent: Elizabeth Bhakta, who was seen by synchronous (real-time) audio-video technology, and/or his healthcare decision maker, is aware that this patient-initiated, Telehealth encounter on 2022 is a billable service, with coverage as determined by his insurance carrier. He is aware that he may receive a bill and has provided verbal consent to proceed: Yes. I was in the office while conducting this encounter. Assessment & Plan:   Diagnoses and all orders for this visit:      ICD-10-CM ICD-9-CM    1. Attention deficit disorder, unspecified hyperactivity presence  F98.8 314.00 dextroamphetamine-amphetamine (ADDERALL) 15 mg tablet      dextroamphetamine-amphetamine (ADDERALL) 15 mg tablet      dextroamphetamine-amphetamine (ADDERALL) 15 mg tablet      amphetamine-dextroamphetamine XR (ADDERALL XR) 30 mg XR capsule      amphetamine-dextroamphetamine XR (ADDERALL XR) 30 mg XR capsule      amphetamine-dextroamphetamine XR (ADDERALL XR) 30 mg XR capsule   2. Anxiety  F41.9 300.00 ALPRAZolam (XANAX) 0.25 mg tablet       1,2:  Refills, and follow-up reviewed with pt at visit. Follow-up and Dispositions    · Return in about 3 months (around 2022) for medication follow-up--in-office or virtual.       reviewed medications and side effects in detail    Plan and evaluation (above) reviewed with pt at visit  Patient voiced understanding of plan and provided with time to ask/review questions. After Visit Summary (AVS) provided to pt after visit with additional instructions as needed/reviewed. AVS:  []  Available to patient in [facility] after visit signed. []  Mailed to patient after visit. [x]  Not sent to patient after visit.       No future appointments. --Updated future visits after patient check-out. Subjective:   Bernabe Peraza was seen for:  Chief Complaint   Patient presents with    Medication Evaluation     f/u     Notes (nursing/rooming note):  --Xanax refill going on trip soon with lots of flying  --15mg adderrall refill      Notes no problems with ADHD medications. Prescription Monitoring Program ([place]) database query with fills:  Filled  Written  Sold  ID  Drug  QTY  Days  Prescriber  RX #  Dispenser  Refill  Daily Dose*  Pymt Type       03/22/2022 01/14/2022   1  Dextroamp-Amphet Er 30 Mg Cap   30.00  30  Cl Chi  [id number]  Vir (0480)  0   Private Pay  [facility]     03/15/2022  01/14/2022   1  Dextroamp-Amphetamin 15 Mg Tab   30.00  30  Cl Chi  [id number]  Vir (0480)  0   Private Pay  [facility]     02/21/2022 01/14/2022   1  Dextroamp-Amphet Er 30 Mg Cap   30.00  30  Cl Chi  [id number]  Vir (0480)  0   Private Pay  [facility]     02/14/2022 01/14/2022   1  Dextroamp-Amphetamin 15 Mg Tab   30.00  30  Cl Chi  [id number]  Vir (0480)  0   Private Pay  [facility]     01/21/2022 01/14/2022   1  Dextroamp-Amphet Er 30 Mg Cap   30.00  30  Cl Chi  [id number]  Vir (0480)  0   Private Pay  [facility]     01/14/2022 01/14/2022   1  Dextroamp-Amphetamin 15 Mg Tab   30.00  30  Cl Chi  [id number]  Vir (0480           No Xanax refills through April 2020. Wt Readings from Last 3 Encounters:   01/14/22 123 lb (55.8 kg)   10/14/21 121 lb (54.9 kg)   06/11/21 112 lb (50.8 kg)     BP Readings from Last 3 Encounters:   01/14/22 107/70   10/14/21 122/82   06/11/21 111/78     Pulse Readings from Last 3 Encounters:   01/14/22 81   10/14/21 87   06/11/21 (!) 108       Refills reviewed as above. Nursing screenings reviewed by provider at visit. No Known Allergies    Prior to Admission medications    Medication Sig Start Date End Date Taking? Authorizing Provider   amphetamine-dextroamphetamine XR (ADDERALL XR) 30 mg XR capsule Take 1 Capsule by mouth daily. Max Daily Amount: 30 mg. 3/22/22  Yes Braxton Spencer MD   dextroamphetamine-amphetamine (ADDERALL) 15 mg tablet Take 1 Tablet by mouth daily as needed (ADHD). With evening classes/work. 3/15/22  Yes Braxton Spencer MD   dextroamphetamine-amphetamine (ADDERALL) 15 mg tablet Take 1 Tablet by mouth daily as needed (ADHD). With evening classes/work. 2/13/22  Yes Braxton Spencer MD   dextroamphetamine-amphetamine (ADDERALL) 15 mg tablet Take 1 Tablet by mouth daily as needed (attention). With evening classes/work 1/14/22  Yes Braxton Spencer MD   amphetamine-dextroamphetamine XR (ADDERALL XR) 30 mg XR capsule Take 1 Capsule by mouth daily. Max Daily Amount: 30 mg. 2/20/22  Yes Braxton Spencer MD   amphetamine-dextroamphetamine XR (ADDERALL XR) 30 mg XR capsule Take 1 Capsule by mouth daily. Max Daily Amount: 30 mg. 1/21/22  Yes Braxton Spencer MD   fluticasone [facility]) 50 mcg/actuation nasal spray 2 Sprays by Both Nostrils route daily. 6/5/17  Yes Torres Desai MD   fexofenadine (ALLEGRA) 180 mg tablet Take 1 Tab by mouth daily. 6/5/17  Yes Torres Desai MD   cyanocobalamin (B-12 DOTS) 500 mcg tablet Take 1 Tab by mouth daily. 6/2/15  Yes Torres Desai MD   cholecalciferol (VITAMIN D3) 1,000 unit tablet Take 1 Tab by mouth daily. 6/2/15  Yes Torres Desai MD   ALPRAZolam Teetee Pedjuvencio) 0.25 mg tablet Take 1 Tab by mouth nightly as needed for Anxiety. Max Daily Amount: 0.25 mg. Patient not taking: Reported on 8/26/2020 4/30/19   Torres Desai MD   Omeprazole delayed release (PRILOSEC D/R) 20 mg tablet Take 1 Tab by mouth daily. Patient not taking: Reported on 4/14/2022 1/6/16   Torres Desai MD         ROS    PHYSICAL EXAMINATION:    Vital Signs: There were no vitals taken for this visit.    Patient-Reported Vitals 2/19/2021   Patient-Reported Temperature 97.6        Constitutional: [x] Appears well-developed and well-nourished [x] No apparent distress      Mental status: [x] Alert and awake  [x] Oriented [x] Able to follow commands       Eyes:   EOM    [x]  Normal      Sclera  [x]  Normal              Discharge [x]  None visible       HENT: [x] Normocephalic, atraumatic    [x] Mouth/Throat: Mucous membranes are moist    External Ears [x] Normal      Neck: [x] No visualized mass     Pulmonary/Chest: [x] Respiratory effort normal   [x] No visualized signs of difficulty breathing or respiratory distress    Musculoskeletal:  [x] Normal range of motion of neck    Neurological:        [x] No Facial Asymmetry (Cranial nerve 7 motor function) (limited exam due to video visit)          [x] No gaze palsy     Skin:        [x] No significant exanthematous lesions or discoloration noted on facial skin             Psychiatric:       [x] Normal Affect       Other pertinent observable physical exam findings:  None. We discussed the expected course, resolution and complications of the diagnosis(es) in detail. Medication risks, benefits, costs, interactions, and alternatives were discussed as indicated. I advised him to contact the office if his condition worsens, changes or fails to improve as anticipated. He expressed understanding with the diagnosis(es) and plan. Ailyn Arreola is a 32 y.o. male who was evaluated by a video visit encounter for concerns as above. Ailyn Arreola is being evaluated by a Virtual Visit (video visit) encounter to address concerns as mentioned above. A caregiver was present when appropriate. Due to this being a TeleHealth encounter (During [facility] public health emergency), evaluation of the following organ systems was limited: Vitals/Constitutional/EENT/Resp/CV/GI//MS/Neuro/Skin/Heme-Lymph-Imm.   Pursuant to the emergency declaration under the [facility] waiver authority and the [employer] and [employer] Act, this Virtual Visit was conducted with patient's (and/or legal guardian's) consent, to reduce the patient's risk of exposure to COVID-19 and provide necessary medical care. The patient (and/or legal guardian) has also been advised to contact this office for worsening conditions or problems, and seek emergency medical treatment and/or call 911 if deemed necessary. Patient identification was verified at the start of the visit: YES. Services were provided through a video synchronous discussion virtually to substitute for in-person clinic visit. Patient was located at their individual home (or other location as per patient preference). Provider was located in medical office. An electronic signature was used to authenticate this note.   -- Melly Min MD

## 2022-07-13 DIAGNOSIS — F98.8 ATTENTION DEFICIT DISORDER, UNSPECIFIED HYPERACTIVITY PRESENCE: ICD-10-CM

## 2022-07-13 NOTE — TELEPHONE ENCOUNTER
Pt left a voice message requesting a refill. [phone]    Last visit 04/14/2022 Virtual visit MD Lori Alanis   Next appointment 07/19/2022 MD Lori Alanis   Previous refill encounter(s)   06/13/2022 Adderall -IR #30    No access to [place] [id number] Only     Intervention Detail: New Rx: 1, reason: Patient Preference   Time Spent (min): 5        Requested Prescriptions     Pending Prescriptions Disp Refills    dextroamphetamine-amphetamine (ADDERALL) 15 mg tablet 30 Tablet 0     Sig: Take 1 Tablet by mouth daily as needed (ADHD). With evening classes/work.

## 2022-07-14 RX ORDER — DEXTROAMPHETAMINE SACCHARATE, AMPHETAMINE ASPARTATE, DEXTROAMPHETAMINE SULFATE AND AMPHETAMINE SULFATE 3.75; 3.75; 3.75; 3.75 MG/1; MG/1; MG/1; MG/1
15 TABLET ORAL
Qty: 30 TABLET | Refills: 0 | Status: CANCELLED | OUTPATIENT
Start: 2022-07-14

## 2022-07-16 RX ORDER — DEXTROAMPHETAMINE SACCHARATE, AMPHETAMINE ASPARTATE, DEXTROAMPHETAMINE SULFATE AND AMPHETAMINE SULFATE 3.75; 3.75; 3.75; 3.75 MG/1; MG/1; MG/1; MG/1
15 TABLET ORAL
Qty: 30 TABLET | Refills: 0 | Status: SHIPPED | OUTPATIENT
Start: 2022-07-16 | End: 2022-09-15 | Stop reason: SDUPTHER

## 2022-07-16 RX ORDER — DEXTROAMPHETAMINE SACCHARATE, AMPHETAMINE ASPARTATE MONOHYDRATE, DEXTROAMPHETAMINE SULFATE AND AMPHETAMINE SULFATE 7.5; 7.5; 7.5; 7.5 MG/1; MG/1; MG/1; MG/1
30 CAPSULE, EXTENDED RELEASE ORAL DAILY
Qty: 30 CAPSULE | Refills: 0 | Status: SHIPPED | OUTPATIENT
Start: 2022-07-21 | End: 2022-07-19 | Stop reason: SDUPTHER

## 2022-07-16 NOTE — TELEPHONE ENCOUNTER
Refill request(s) approved--Adderall--as below. Prescription Monitoring Program ([place]) database query with fills:  Filled  Written  Sold  ID  Drug  QTY  Days  Prescriber  RX #  Dispenser  Refill  Daily Dose*  Pymt Type       06/21/2022 04/14/2022   1  Dextroamp-Amphet Er 30 Mg Cap   30.00  30  Cl Chi  [id number]  Vir (0480)  0   Private Pay  [facility]     06/13/2022 04/14/2022   1  Dextroamp-Amphetamin 15 Mg Tab   30.00  30  Cl Chi                Requested Prescriptions     Signed Prescriptions Disp Refills    dextroamphetamine-amphetamine (ADDERALL) 15 mg tablet 30 Tablet 0     Sig: Take 1 Tablet by mouth daily as needed (ADHD). With evening classes/work. Authorizing Provider: Renetta Kay amphetamine-dextroamphetamine XR (ADDERALL XR) 30 mg XR capsule 30 Capsule 0     Sig: Take 1 Capsule by mouth daily. Max Daily Amount: 30 mg. Authorizing Provider: Elyssa Dial     Immed-release product approved to fill today. ER product approved to fill on 6-21-22.

## 2022-07-19 ENCOUNTER — OFFICE VISIT (OUTPATIENT)
Dept: INTERNAL MEDICINE CLINIC | Age: 27
End: 2022-07-19

## 2022-07-19 VITALS
BODY MASS INDEX: 16.88 KG/M2 | SYSTOLIC BLOOD PRESSURE: 118 MMHG | HEART RATE: 130 BPM | OXYGEN SATURATION: 99 % | HEIGHT: 66 IN | TEMPERATURE: 98.1 F | RESPIRATION RATE: 16 BRPM | WEIGHT: 105 LBS | DIASTOLIC BLOOD PRESSURE: 83 MMHG

## 2022-07-19 DIAGNOSIS — F98.8 ATTENTION DEFICIT DISORDER, UNSPECIFIED HYPERACTIVITY PRESENCE: Primary | ICD-10-CM

## 2022-07-19 DIAGNOSIS — F41.9 ANXIETY: ICD-10-CM

## 2022-07-19 DIAGNOSIS — R63.4 UNINTENDED WEIGHT LOSS: ICD-10-CM

## 2022-07-19 PROCEDURE — 99214 OFFICE O/P EST MOD 30 MIN: CPT | Performed by: INTERNAL MEDICINE

## 2022-07-19 RX ORDER — DEXTROAMPHETAMINE SACCHARATE, AMPHETAMINE ASPARTATE MONOHYDRATE, DEXTROAMPHETAMINE SULFATE AND AMPHETAMINE SULFATE 7.5; 7.5; 7.5; 7.5 MG/1; MG/1; MG/1; MG/1
30 CAPSULE, EXTENDED RELEASE ORAL DAILY
Qty: 30 CAPSULE | Refills: 0 | Status: SHIPPED | OUTPATIENT
Start: 2022-07-19 | End: 2022-09-15 | Stop reason: SDUPTHER

## 2022-07-19 RX ORDER — ALPRAZOLAM 0.25 MG/1
.25-.5 TABLET ORAL
Qty: 15 TABLET | Refills: 0 | Status: SHIPPED | OUTPATIENT
Start: 2022-07-19 | End: 2022-10-29

## 2022-07-19 RX ORDER — DEXTROAMPHETAMINE SACCHARATE, AMPHETAMINE ASPARTATE, DEXTROAMPHETAMINE SULFATE AND AMPHETAMINE SULFATE 3.75; 3.75; 3.75; 3.75 MG/1; MG/1; MG/1; MG/1
15 TABLET ORAL
Qty: 30 TABLET | Refills: 0 | Status: SHIPPED | OUTPATIENT
Start: 2022-08-15 | End: 2022-09-15 | Stop reason: SDUPTHER

## 2022-07-19 RX ORDER — DEXTROAMPHETAMINE SACCHARATE, AMPHETAMINE ASPARTATE MONOHYDRATE, DEXTROAMPHETAMINE SULFATE AND AMPHETAMINE SULFATE 7.5; 7.5; 7.5; 7.5 MG/1; MG/1; MG/1; MG/1
30 CAPSULE, EXTENDED RELEASE ORAL DAILY
Qty: 30 CAPSULE | Refills: 0 | Status: SHIPPED | OUTPATIENT
Start: 2022-08-18 | End: 2022-09-15 | Stop reason: SDUPTHER

## 2022-07-19 RX ORDER — DEXTROAMPHETAMINE SACCHARATE, AMPHETAMINE ASPARTATE MONOHYDRATE, DEXTROAMPHETAMINE SULFATE AND AMPHETAMINE SULFATE 7.5; 7.5; 7.5; 7.5 MG/1; MG/1; MG/1; MG/1
30 CAPSULE, EXTENDED RELEASE ORAL DAILY
Qty: 30 CAPSULE | Refills: 0 | Status: SHIPPED | OUTPATIENT
Start: 2022-09-17 | End: 2022-09-15 | Stop reason: SDUPTHER

## 2022-07-19 RX ORDER — DEXTROAMPHETAMINE SACCHARATE, AMPHETAMINE ASPARTATE, DEXTROAMPHETAMINE SULFATE AND AMPHETAMINE SULFATE 3.75; 3.75; 3.75; 3.75 MG/1; MG/1; MG/1; MG/1
15 TABLET ORAL
Qty: 30 TABLET | Refills: 0 | Status: SHIPPED | OUTPATIENT
Start: 2022-09-14 | End: 2022-09-15 | Stop reason: SDUPTHER

## 2022-07-19 NOTE — PROGRESS NOTES
rm 16    Leaving for [place] tomorrow 12 days  Needs to fill adderral today or tomorrow  Had 2 deaths in family and dog recently  Used xanax for those  Unintended weight loss    Chief Complaint   Patient presents with    Medication Evaluation     1. Have you been to the ER, urgent care clinic since your last visit? Hospitalized since your last visit? No    2. Have you seen or consulted any other health care providers outside of the [address] [place] [address] since your last visit? Include any pap smears or colon screening.  No     Visit Vitals  /83 (BP 1 Location: Left arm, BP Patient Position: Sitting, BP Cuff Size: Adult)   Pulse (!) 130   Temp 98.1 °F (36.7 °C) (Oral)   Resp 16   Ht 5' 6\" (1.676 m)   Wt 105 lb (47.6 kg)   SpO2 99%   BMI 16.95 kg/m²

## 2022-07-19 NOTE — PROGRESS NOTES
Karina Quezada (: 1995) is a 32 y.o. male, established patient, here for evaluation of the following chief complaint(s):  Chief Complaint   Patient presents with    Medication Evaluation       Assessment and Plan:       ICD-10-CM ICD-9-CM    1. Attention deficit disorder, unspecified hyperactivity presence  F98.8 314.00 dextroamphetamine-amphetamine (ADDERALL) 15 mg tablet      dextroamphetamine-amphetamine (ADDERALL) 15 mg tablet      amphetamine-dextroamphetamine XR (ADDERALL XR) 30 mg XR capsule      amphetamine-dextroamphetamine XR (ADDERALL XR) 30 mg XR capsule      amphetamine-dextroamphetamine XR (ADDERALL XR) 30 mg XR capsule      2. Anxiety  F41.9 300.00 ALPRAZolam (XANAX) 0.25 mg tablet      3. Unintended weight loss  R63.4 783.21           1-2:  Refills reviewed with pt at visit. 3.   Monitoring at follow-up reviewed. Consider nutrition eval for increased calories PRN. Follow-up and Dispositions    Return in about 3 months (around 10/19/2022) for medication follow-up, weight follow-up.       reviewed medications and side effects in detail    Plan and evaluation (above) reviewed with pt at visit  Patient voiced understanding of plan and provided with time to ask/review questions. After Visit Summary (AVS) provided to pt after visit with additional instructions as needed/reviewed. No future appointments. --Updated future visits after patient check-out. History of Present Illness:     Notes (nursing/rooming note copied below in italics):  Leaving for [facility] tomorrow 12 days  Needs to fill adderral today or tomorrow  Had 2 deaths in family and dog recently  Used xanax for those  Unintended weight loss      Notes no problems with ADHD medications.     Prescription Monitoring Program ([place]) database query with fills:  Filled  Written  Sold  ID  Drug  QTY  Days  Prescriber  RX #  Dispenser  Refill  Daily Dose*  Pymt Type       2022   1  Dextroamp-Amphetamin 15 Mg Tab   30.00  30  Cl Chi  [id number]  Vir (0480)  0   Private Pay  [place]     06/21/2022 04/14/2022   1  Dextroamp-Amphet Er 30 Mg Cap   30.00  30  Cl Chi  [id number]  Vir (0480)  0   [facility] Pay  [place]     06/13/2022 04/14/2022   1  Dextroamp-Amphetamin 15 Mg Tab   30.00  30  Cl Chi  [id number]  Vir (0480)  0   [facility] Pay  [place]     05/22/2022 04/14/2022   1  Dextroamp-Amphet Er 30 Mg Cap   30.00  30  Cl Chi  [id number]  Vir (0480)  0   [facility] Pay  [place]     05/14/2022 04/14/2022   1  Dextroamp-Amphetamin 15 Mg Tab   30.00  30  Cl Chi  [id number]  Vir (0480)  0   [facility]        Refilled both meds recently. ER product was not due to fill until 7/21. Nursing able to call     Wt Readings from Last 3 Encounters:   07/19/22 105 lb (47.6 kg)   01/14/22 123 lb (55.8 kg)   10/14/21 121 lb (54.9 kg)     BP Readings from Last 3 Encounters:   07/19/22 118/83   01/14/22 107/70   10/14/21 122/82     Pulse Readings from Last 3 Encounters:   07/19/22 (!) 130   01/14/22 81   10/14/21 87       Refills reviewed as above. Nursing screenings reviewed by provider at visit. Prior to Admission medications    Medication Sig Start Date End Date Taking? Authorizing Provider   dextroamphetamine-amphetamine (ADDERALL) 15 mg tablet Take 1 Tablet by mouth daily as needed (ADHD). With evening classes/work. 7/16/22  Yes Micha Parson MD   amphetamine-dextroamphetamine XR (ADDERALL XR) 30 mg XR capsule Take 1 Capsule by mouth daily. Max Daily Amount: 30 mg. 7/21/22  Yes Micha Parson MD   dextroamphetamine-amphetamine (ADDERALL) 15 mg tablet Take 1 Tablet by mouth daily as needed (ADHD). With evening classes/work. 5/14/22  Yes Micha Parson MD   dextroamphetamine-amphetamine (ADDERALL) 15 mg tablet Take 1 Tablet by mouth daily as needed (attention). With evening classes/work 6/13/22  Yes Micha Parson MD   amphetamine-dextroamphetamine XR (ADDERALL XR) 30 mg XR capsule Take 1 Capsule by mouth daily.  Max Daily Amount: 30 mg. 4/22/22  Yes Mercy De La Cruz MD   amphetamine-dextroamphetamine XR (ADDERALL XR) 30 mg XR capsule Take 1 Capsule by mouth daily. Max Daily Amount: 30 mg. 5/22/22  Yes Mercy De La Cruz MD   ALPRAZolam Kalpesh Andrade) 0.25 mg tablet Take 1-2 Tablets by mouth every six (6) hours as needed for Anxiety (As needed prior to/for flying). Max Daily Amount: 2 mg. 4/14/22  Yes Mercy De La Cruz MD   fluticasone [facility]) 50 mcg/actuation nasal spray 2 Sprays by Both Nostrils route daily. 6/5/17  Yes Alma Akbar MD   fexofenadine (ALLEGRA) 180 mg tablet Take 1 Tab by mouth daily. 6/5/17  Yes Alma Akbar MD   Omeprazole delayed release (PRILOSEC D/R) 20 mg tablet Take 1 Tab by mouth daily. 1/6/16  Yes Alma Akbar MD   cyanocobalamin (B-12 DOTS) 500 mcg tablet Take 1 Tab by mouth daily. 6/2/15  Yes Alma Akbar MD   cholecalciferol (VITAMIN D3) 1,000 unit tablet Take 1 Tab by mouth daily. 6/2/15  Yes Alma Akbar MD        ROS    Vitals:    07/19/22 1603   BP: 118/83   Pulse: (!) 130   Resp: 16   Temp: 98.1 °F (36.7 °C)   TempSrc: Oral   SpO2: 99%   Weight: 105 lb (47.6 kg)   Height: 5' 6\" (1.676 m)      Body mass index is 16.95 kg/m². Physical Exam:     Physical Exam  Vitals and nursing note reviewed. Constitutional:       General: He is not in acute distress. Appearance: Normal appearance. He is well-developed. He is not diaphoretic. HENT:      Head: Normocephalic and atraumatic. Eyes:      General: No scleral icterus. Right eye: No discharge. Left eye: No discharge. Conjunctiva/sclera: Conjunctivae normal.   Cardiovascular:      Rate and Rhythm: Normal rate and regular rhythm. Pulses: Normal pulses. Heart sounds: Normal heart sounds. No murmur heard. No friction rub. No gallop. Pulmonary:      Effort: Pulmonary effort is normal. No respiratory distress. Breath sounds: Normal breath sounds. No stridor.  No wheezing, rhonchi or rales. Abdominal:      General: Bowel sounds are normal. There is no distension. Palpations: Abdomen is soft. Tenderness: There is no abdominal tenderness. There is no guarding or rebound. Musculoskeletal:         General: No swelling, tenderness or deformity. Skin:     General: Skin is warm. Coloration: Skin is not jaundiced or pale. Findings: No bruising, erythema or rash. Neurological:      General: No focal deficit present. Mental Status: He is alert. Motor: No abnormal muscle tone. Coordination: Coordination normal.      Gait: Gait normal.   Psychiatric:         Mood and Affect: Mood normal.         Behavior: Behavior normal.         Thought Content: Thought content normal.         Judgment: Judgment normal.       An electronic signature was used to authenticate this note.   -- Elvis Cummings MD

## 2022-09-15 ENCOUNTER — VIRTUAL VISIT (OUTPATIENT)
Dept: INTERNAL MEDICINE CLINIC | Age: 27
End: 2022-09-15

## 2022-09-15 DIAGNOSIS — E53.8 B12 DEFICIENCY: ICD-10-CM

## 2022-09-15 DIAGNOSIS — F41.9 ANXIETY: ICD-10-CM

## 2022-09-15 DIAGNOSIS — R63.4 UNINTENDED WEIGHT LOSS: ICD-10-CM

## 2022-09-15 DIAGNOSIS — F98.8 ATTENTION DEFICIT DISORDER, UNSPECIFIED HYPERACTIVITY PRESENCE: Primary | ICD-10-CM

## 2022-09-15 DIAGNOSIS — E55.9 VITAMIN D DEFICIENCY: ICD-10-CM

## 2022-09-15 PROCEDURE — 99214 OFFICE O/P EST MOD 30 MIN: CPT | Performed by: INTERNAL MEDICINE

## 2022-09-15 RX ORDER — DEXTROAMPHETAMINE SACCHARATE, AMPHETAMINE ASPARTATE, DEXTROAMPHETAMINE SULFATE AND AMPHETAMINE SULFATE 3.75; 3.75; 3.75; 3.75 MG/1; MG/1; MG/1; MG/1
15 TABLET ORAL
Qty: 30 TABLET | Refills: 0 | Status: SHIPPED | OUTPATIENT
Start: 2022-11-15

## 2022-09-15 RX ORDER — DEXTROAMPHETAMINE SACCHARATE, AMPHETAMINE ASPARTATE MONOHYDRATE, DEXTROAMPHETAMINE SULFATE AND AMPHETAMINE SULFATE 7.5; 7.5; 7.5; 7.5 MG/1; MG/1; MG/1; MG/1
30 CAPSULE, EXTENDED RELEASE ORAL DAILY
Qty: 30 CAPSULE | Refills: 0 | Status: SHIPPED | OUTPATIENT
Start: 2022-11-19

## 2022-09-15 RX ORDER — DEXTROAMPHETAMINE SACCHARATE, AMPHETAMINE ASPARTATE, DEXTROAMPHETAMINE SULFATE AND AMPHETAMINE SULFATE 3.75; 3.75; 3.75; 3.75 MG/1; MG/1; MG/1; MG/1
15 TABLET ORAL
Qty: 30 TABLET | Refills: 0 | Status: SHIPPED | OUTPATIENT
Start: 2022-10-16

## 2022-09-15 RX ORDER — DEXTROAMPHETAMINE SACCHARATE, AMPHETAMINE ASPARTATE, DEXTROAMPHETAMINE SULFATE AND AMPHETAMINE SULFATE 3.75; 3.75; 3.75; 3.75 MG/1; MG/1; MG/1; MG/1
15 TABLET ORAL
Qty: 30 TABLET | Refills: 0 | Status: SHIPPED | OUTPATIENT
Start: 2022-09-16

## 2022-09-15 RX ORDER — DEXTROAMPHETAMINE SACCHARATE, AMPHETAMINE ASPARTATE MONOHYDRATE, DEXTROAMPHETAMINE SULFATE AND AMPHETAMINE SULFATE 7.5; 7.5; 7.5; 7.5 MG/1; MG/1; MG/1; MG/1
30 CAPSULE, EXTENDED RELEASE ORAL DAILY
Qty: 30 CAPSULE | Refills: 0 | Status: SHIPPED | OUTPATIENT
Start: 2022-09-20

## 2022-09-15 RX ORDER — DEXTROAMPHETAMINE SACCHARATE, AMPHETAMINE ASPARTATE MONOHYDRATE, DEXTROAMPHETAMINE SULFATE AND AMPHETAMINE SULFATE 7.5; 7.5; 7.5; 7.5 MG/1; MG/1; MG/1; MG/1
30 CAPSULE, EXTENDED RELEASE ORAL DAILY
Qty: 30 CAPSULE | Refills: 0 | Status: SHIPPED | OUTPATIENT
Start: 2022-10-20

## 2022-09-15 NOTE — PROGRESS NOTES
VV-Pt is aware of billable appt depending on insurance plan. Preferred number [phone]    Pt verbally agrees for labs, orders, and others to be mailed to confirmed home address. Identified pt with two pt identifiers(name and ). Reviewed record in preparation for visit and have obtained necessary documentation. All patient medications has been reviewed. Chief Complaint   Patient presents with    Attention Deficit Disorder    Medication Refill       Health Maintenance Review: Patient reminded of \"due or due soon\" health maintenance. I have asked the patient to contact his/her primary care provider (PCP) for follow-up on his/her health maintenance. Patient-Reported Vitals 9/15/2022   Patient-Reported Weight 114 lb   Patient-Reported Temperature -        Wt Readings from Last 3 Encounters:   22 105 lb (47.6 kg)   22 123 lb (55.8 kg)   10/14/21 121 lb (54.9 kg)     Temp Readings from Last 3 Encounters:   22 98.1 °F (36.7 °C) (Oral)   22 98.4 °F (36.9 °C)   10/14/21 97.7 °F (36.5 °C) (Oral)     BP Readings from Last 3 Encounters:   22 118/83   22 107/70   10/14/21 122/82     Pulse Readings from Last 3 Encounters:   22 (!) 130   22 81   10/14/21 87         Coordination of Care Questionnaire:   1) Have you been to an emergency room, urgent care, or hospitalized since your last visit?   no       2. Have seen or consulted any other health care provider since your last visit? NO      Patient is accompanied by self I have received verbal consent from Narayan Castillo to discuss any/all medical information while they are present in the room.

## 2022-09-15 NOTE — PROGRESS NOTES
Claudean Plenty (: 1995) is a 32 y.o. male, established patient, here for evaluation of the following chief complaint(s)--see below:    Claudean Plenty is a 32 y.o. male who was seen by synchronous (real-time) audio-video technology on 9/15/2022. Consent: Claudean Plenty, who was seen by synchronous (real-time) audio-video technology, and/or his healthcare decision maker, is aware that this patient-initiated, Telehealth encounter on 9/15/2022 is a billable service, with coverage as determined by his insurance carrier. He is aware that he may receive a bill and has provided verbal consent to proceed: Yes. I was in the office while conducting this encounter. Assessment & Plan:   Diagnoses and all orders for this visit:      ICD-10-CM ICD-9-CM    1. Attention deficit disorder, unspecified hyperactivity presence  F98.8 314.00 dextroamphetamine-amphetamine (ADDERALL) 15 mg tablet      dextroamphetamine-amphetamine (ADDERALL) 15 mg tablet      dextroamphetamine-amphetamine (ADDERALL) 15 mg tablet      amphetamine-dextroamphetamine XR (ADDERALL XR) 30 mg XR capsule      amphetamine-dextroamphetamine XR (ADDERALL XR) 30 mg XR capsule      amphetamine-dextroamphetamine XR (ADDERALL XR) 30 mg XR capsule      2. Anxiety  F41.9 300.00       3. Unintended weight loss  R63.4 783.21       4. Vitamin D deficiency  E55.9 268.9       5. B12 deficiency  E53.8 266.2           1:  Refills reviewed as above--based on last refills:  Plan IR to fill on 22; and ER to rel on 22. 2.  No refill needed at this time on alprazolam.    3.  Monitoring on Adderall reviewed. 4,5:  Follow-up with future office/visit labs reviewed.       Follow-up and Dispositions    Return in about 3 months (around 12/15/2022) for medication follow-up.       reviewed diet, weight   reviewed medications and side effects in detail    Plan and evaluation (above) reviewed with pt at visit  Patient voiced understanding of plan and provided with time to ask/review questions. After Visit Summary (AVS) provided to pt after visit with additional instructions as needed/reviewed. AVS:  [x]  Available to patient in [facility] after visit signed. []  Mailed to patient after visit. []  Not sent to patient after visit. No future appointments. --Updated future visits after patient check-out. Subjective:   Sonny Montalvo was seen for:  Chief Complaint   Patient presents with    Attention Deficit Disorder    Medication Refill     Notes (nursing/rooming note):  Preferred number [phone]  Pt verbally agrees for labs, orders, and others to be mailed to confirmed home address. Notes:    Notes no problems with ADHD medications. Prescription Monitoring Program ([facility]) database query with fills: Wt Readings from Last 3 Encounters:   07/19/22 105 lb (47.6 kg)   01/14/22 123 lb (55.8 kg)   10/14/21 121 lb (54.9 kg)     BP Readings from Last 3 Encounters:   07/19/22 118/83   01/14/22 107/70   10/14/21 122/82     Pulse Readings from Last 3 Encounters:   07/19/22 (!) 130   01/14/22 81   10/14/21 87       He is at 114 lbs with home scale. Eating better and better food. No problems with meds or need to suppelment. Prefers in-office in 3mo for weight follow-up --agree--plan non-fasting  Vit D/B12 or fasting labs with lipids reviewed PRN. Note needed but can update PRN. Lab Results   Component Value Date/Time    Vitamin B12 218 06/01/2015 10:34 AM     Lab Results   Component Value Date/Time    VITAMIN D, 25-HYDROXY 9.3 (L) 06/01/2015 10:34 AM         Lab Results   Component Value Date/Time    Cholesterol, total 108 06/01/2015 10:34 AM    HDL Cholesterol 29 (L) 06/01/2015 10:34 AM    LDL, calculated 56 06/01/2015 10:34 AM    VLDL, calculated 23 06/01/2015 10:34 AM    Triglyceride 116 (H) 06/01/2015 10:34 AM       Refills reviewed as above. Has fe w alprazolam remaining and no refill needed at thsi time.     Will request PRN prior to visit--reviewed #15 with refill can be approved if needed      Nursing screenings reviewed by provider at visit. No Known Allergies    Prior to Admission medications    Medication Sig Start Date End Date Taking? Authorizing Provider   MAGNESIUM PO Take  by mouth. Yes Provider, Historical   amphetamine-dextroamphetamine XR (ADDERALL XR) 30 mg XR capsule Take 1 Capsule by mouth daily. Max Daily Amount: 30 mg. 9/17/22  Yes Sohail Dickson MD   dextroamphetamine-amphetamine (ADDERALL) 15 mg tablet Take 1 Tablet by mouth daily as needed (ADHD). With evening classes/work. 7/16/22  Yes Sohail Dickson MD   fluticasone [facility]) 50 mcg/actuation nasal spray 2 Sprays by Both Nostrils route daily. 6/5/17  Yes Bing Putnam MD   fexofenadine (ALLEGRA) 180 mg tablet Take 1 Tab by mouth daily. 6/5/17  Yes Bing Putnam MD   Omeprazole delayed release (PRILOSEC D/R) 20 mg tablet Take 1 Tab by mouth daily. 1/6/16  Yes Bing Putnam MD   cyanocobalamin (B-12 DOTS) 500 mcg tablet Take 1 Tab by mouth daily. 6/2/15  Yes Bing Putnam MD   cholecalciferol (VITAMIN D3) 1,000 unit tablet Take 1 Tab by mouth daily. 6/2/15  Yes Bing Putnam MD   dextroamphetamine-amphetamine (ADDERALL) 15 mg tablet Take 1 Tablet by mouth daily as needed (ADHD). With evening classes/work. Patient not taking: Reported on 9/15/2022 9/14/22   Sohail Dickson MD   dextroamphetamine-amphetamine (ADDERALL) 15 mg tablet Take 1 Tablet by mouth daily as needed (attention). With evening classes/work  Patient not taking: Reported on 9/15/2022 8/15/22   Sohail Dickson MD   amphetamine-dextroamphetamine XR (ADDERALL XR) 30 mg XR capsule Take 1 Capsule by mouth daily. Max Daily Amount: 30 mg. Patient not taking: Reported on 9/15/2022 8/18/22   Sohail Dickson MD   ALPRAZolam Liseth Leonardo) 0.25 mg tablet Take 1-2 Tablets by mouth every six (6) hours as needed for Anxiety (As needed prior to/for flying). Max Daily Amount: 2 mg. Patient not taking: Reported on 9/15/2022 7/19/22   Jarek Serna MD   amphetamine-dextroamphetamine XR (ADDERALL XR) 30 mg XR capsule Take 1 Capsule by mouth daily. Max Daily Amount: 30 mg. Authorized for early refill--to fill today, since going out of town tomorrow. This replaces script to refill on 7-21-22. Patient not taking: Reported on 9/15/2022 7/19/22   Jarek Serna MD         ROS    PHYSICAL EXAMINATION:    Vital Signs: There were no vitals taken for this visit. Patient-Reported Vitals 9/15/2022   Patient-Reported Weight 114 lb   Patient-Reported Temperature -     Wt Readings from Last 3 Encounters:   07/19/22 105 lb (47.6 kg)   01/14/22 123 lb (55.8 kg)   10/14/21 121 lb (54.9 kg)         Constitutional: [x] Appears well-developed and well-nourished [x] No apparent distress      Mental status: [x] Alert and awake  [x] Oriented [x] Able to follow commands       Eyes:   EOM    [x]  Normal      Sclera  [x]  Normal              Discharge [x]  None visible       HENT: [x] Normocephalic, atraumatic    [x] Mouth/Throat: Mucous membranes are moist    External Ears [x] Normal      Neck: [x] No visualized mass     Pulmonary/Chest: [x] Respiratory effort normal   [x] No visualized signs of difficulty breathing or respiratory distress    Musculoskeletal:  [x] Normal range of motion of neck    Neurological:        [x] No Facial Asymmetry (Cranial nerve 7 motor function) (limited exam due to video visit)          [x] No gaze palsy     Skin:        [x] No significant exanthematous lesions or discoloration noted on facial skin             Psychiatric:       [x] Normal Affect       Other pertinent observable physical exam findings:  None. We discussed the expected course, resolution and complications of the diagnosis(es) in detail. Medication risks, benefits, costs, interactions, and alternatives were discussed as indicated.   I advised him to contact the office if his condition worsens, changes or fails to improve as anticipated. He expressed understanding with the diagnosis(es) and plan. Rick Hayes is a 32 y.o. male who was evaluated by a video visit encounter for concerns as above. Rick Hayes is being evaluated by a Virtual Visit (video visit) encounter to address concerns as mentioned above. A caregiver was present when appropriate. Due to this being a TeleHealth encounter (During JODDU-57 public health emergency), evaluation of the following organ systems was limited: Vitals/Constitutional/EENT/Resp/CV/GI//MS/Neuro/Skin/Heme-Lymph-Imm. Pursuant to the emergency declaration under the [address] [place] [address] and the [employer] and [employer] Act, this Virtual Visit was conducted with patient's (and/or legal guardian's) consent, to reduce the patient's risk of exposure to COVID-19 and provide necessary medical care. The patient (and/or legal guardian) has also been advised to contact this office for worsening conditions or problems, and seek emergency medical treatment and/or call 911 if deemed necessary. Patient identification was verified at the start of the visit: YES. Services were provided through a video synchronous discussion virtually to substitute for in-person clinic visit. Patient was located at their individual home (or other location as per patient preference). Provider was located in medical office. An electronic signature was used to authenticate this note.   -- Pawel Tang MD

## 2022-10-26 DIAGNOSIS — F41.9 ANXIETY: ICD-10-CM

## 2022-10-29 RX ORDER — ALPRAZOLAM 0.25 MG/1
TABLET ORAL
Qty: 15 TABLET | Refills: 0 | Status: SHIPPED | OUTPATIENT
Start: 2022-10-29

## 2022-10-29 NOTE — TELEPHONE ENCOUNTER
Refill request(s) approved--alprazolam.    Prescription Monitoring Program ([place]) database query with fills:      Requested Prescriptions     Signed Prescriptions Disp Refills    ALPRAZolam (XANAX) 0.25 mg tablet 15 Tablet 0     Sig: TAKE 1-2 TABLETS BY MOUTH EVERY SIX (6) HOURS AS NEEDED FOR ANXIETY PRIOR TO/FOR FLYING     Authorizing Provider: Barron Alvarez

## 2022-12-15 ENCOUNTER — VIRTUAL VISIT (OUTPATIENT)
Dept: INTERNAL MEDICINE CLINIC | Age: 27
End: 2022-12-15

## 2022-12-15 DIAGNOSIS — F98.8 ATTENTION DEFICIT DISORDER, UNSPECIFIED HYPERACTIVITY PRESENCE: ICD-10-CM

## 2022-12-15 PROCEDURE — 99213 OFFICE O/P EST LOW 20 MIN: CPT | Performed by: INTERNAL MEDICINE

## 2022-12-15 RX ORDER — GUAIFENESIN 1200 MG/1
1200 TABLET, EXTENDED RELEASE ORAL 2 TIMES DAILY
COMMUNITY

## 2022-12-15 RX ORDER — DEXTROAMPHETAMINE SACCHARATE, AMPHETAMINE ASPARTATE, DEXTROAMPHETAMINE SULFATE AND AMPHETAMINE SULFATE 3.75; 3.75; 3.75; 3.75 MG/1; MG/1; MG/1; MG/1
15 TABLET ORAL
Qty: 30 TABLET | Refills: 0 | Status: SHIPPED | OUTPATIENT
Start: 2022-12-15

## 2022-12-15 RX ORDER — ACETAMINOPHEN 325 MG/1
TABLET ORAL
COMMUNITY

## 2022-12-15 RX ORDER — BISMUTH SUBSALICYLATE 262 MG
1 TABLET,CHEWABLE ORAL DAILY
COMMUNITY

## 2022-12-15 RX ORDER — DEXTROAMPHETAMINE SACCHARATE, AMPHETAMINE ASPARTATE MONOHYDRATE, DEXTROAMPHETAMINE SULFATE AND AMPHETAMINE SULFATE 7.5; 7.5; 7.5; 7.5 MG/1; MG/1; MG/1; MG/1
30 CAPSULE, EXTENDED RELEASE ORAL DAILY
Qty: 30 CAPSULE | Refills: 0 | Status: SHIPPED | OUTPATIENT
Start: 2022-12-20

## 2022-12-15 RX ORDER — DEXTROAMPHETAMINE SACCHARATE, AMPHETAMINE ASPARTATE MONOHYDRATE, DEXTROAMPHETAMINE SULFATE AND AMPHETAMINE SULFATE 7.5; 7.5; 7.5; 7.5 MG/1; MG/1; MG/1; MG/1
30 CAPSULE, EXTENDED RELEASE ORAL DAILY
Qty: 30 CAPSULE | Refills: 0 | Status: SHIPPED | OUTPATIENT
Start: 2023-01-19

## 2022-12-15 RX ORDER — DEXTROAMPHETAMINE SACCHARATE, AMPHETAMINE ASPARTATE MONOHYDRATE, DEXTROAMPHETAMINE SULFATE AND AMPHETAMINE SULFATE 7.5; 7.5; 7.5; 7.5 MG/1; MG/1; MG/1; MG/1
30 CAPSULE, EXTENDED RELEASE ORAL DAILY
Qty: 30 CAPSULE | Refills: 0 | Status: SHIPPED | OUTPATIENT
Start: 2023-02-18

## 2022-12-15 RX ORDER — DEXTROAMPHETAMINE SACCHARATE, AMPHETAMINE ASPARTATE, DEXTROAMPHETAMINE SULFATE AND AMPHETAMINE SULFATE 3.75; 3.75; 3.75; 3.75 MG/1; MG/1; MG/1; MG/1
15 TABLET ORAL
Qty: 30 TABLET | Refills: 0 | Status: SHIPPED | OUTPATIENT
Start: 2023-01-14

## 2022-12-15 RX ORDER — DEXTROAMPHETAMINE SACCHARATE, AMPHETAMINE ASPARTATE, DEXTROAMPHETAMINE SULFATE AND AMPHETAMINE SULFATE 3.75; 3.75; 3.75; 3.75 MG/1; MG/1; MG/1; MG/1
15 TABLET ORAL
Qty: 30 TABLET | Refills: 0 | Status: SHIPPED | OUTPATIENT
Start: 2023-02-13

## 2022-12-15 NOTE — PROGRESS NOTES
Chrissie Espinosa is a 32 y.o. male    Chief Complaint   Patient presents with    Medication Refill     Vitals taken today:  Weight: 111.5lb        1. Have you been to the ER, urgent care clinic since your last visit? Hospitalized since your last visit? NO    2. Have you seen or consulted any other health care providers outside of the [address] [place] [address] since your last visit? Include any pap smears or colon screening.  NO

## 2022-12-15 NOTE — PROGRESS NOTES
Elke Peterson (: 1995) is a 32 y.o. male, established patient, here for evaluation of the following chief complaint(s)--see below:    Elke Peterson is a 32 y.o. male who was seen by synchronous (real-time) audio-video technology on 12/15/2022. Consent: Elke Peterson, who was seen by synchronous (real-time) audio-video technology, and/or his healthcare decision maker, is aware that this patient-initiated, Telehealth encounter on 12/15/2022 is a billable service, with coverage as determined by his insurance carrier. He is aware that he may receive a bill and has provided verbal consent to proceed: Yes. I was in the office while conducting this encounter. Assessment & Plan:   Diagnoses and all orders for this visit:      ICD-10-CM ICD-9-CM    1. Attention deficit disorder, unspecified hyperactivity presence  F98.8 314.00 dextroamphetamine-amphetamine (ADDERALL) 15 mg tablet      amphetamine-dextroamphetamine XR (ADDERALL XR) 30 mg XR capsule      dextroamphetamine-amphetamine (ADDERALL) 15 mg tablet      amphetamine-dextroamphetamine XR (ADDERALL XR) 30 mg XR capsule      dextroamphetamine-amphetamine (ADDERALL) 15 mg tablet      amphetamine-dextroamphetamine XR (ADDERALL XR) 30 mg XR capsule          Refills and follow-up reviewed. Weight mgt and monitoring reviewed. Follow-up and Dispositions    Return in about 3 months (around 3/15/2023) for medication follow-up.       reviewed diet, exercise and weight control  reviewed medications and side effects in detail    Plan and evaluation (above) reviewed with pt at visit  Patient voiced understanding of plan and provided with time to ask/review questions. After Visit Summary (AVS) provided to pt after visit with additional instructions as needed/reviewed. AVS:  []  Available to patient in [facility] after visit signed. []  Mailed to patient after visit. [x]  Not sent to patient after visit.       Future Appointments   Date Time Provider Nathalie Calle   3/16/2023 10:00 AM Gertrudis Cintron MD [facility]   --Updated future visits after patient check-out. Subjective:   David Marrero was seen for:  Chief Complaint   Patient presents with    Medication Refill     Notes (nursing/rooming note):  Vitals taken today:  Weight: 111.5lb      Notes:    Notes no problems with ADHD medications. Prescription Monitoring Program ([place]) database query with fills: Wt Readings from Last 3 Encounters:   07/19/22 105 lb (47.6 kg)   01/14/22 123 lb (55.8 kg)   10/14/21 121 lb (54.9 kg)     BP Readings from Last 3 Encounters:   07/19/22 118/83   01/14/22 107/70   10/14/21 122/82     Pulse Readings from Last 3 Encounters:   07/19/22 (!) 130   01/14/22 81   10/14/21 87       Refills reviewed as above. No specific supplements--just eating better. He has had COVID in terim  and improved  Some sinus symptoms now, but improving with Mucinex. No concern fr sinus infection or need to be see or do (drive-up) POC testing. Followup and in-office scheduling reviewed--preferred for next visit. Nursing screenings reviewed by provider at visit. No Known Allergies    Prior to Admission medications    Medication Sig Start Date End Date Taking? Authorizing Provider   guaiFENesin (Mucinex) 1,200 mg Ta12 ER tablet Take 1,200 mg by mouth two (2) times a day. Yes Provider, Historical   multivitamin (ONE A DAY) tablet Take 1 Tablet by mouth daily. Yes Provider, Historical   acetaminophen (TylenoL) 325 mg tablet Take  by mouth every four (4) hours as needed for Pain. Yes Provider, Historical   ALPRAZolam (XANAX) 0.25 mg tablet TAKE 1-2 TABLETS BY MOUTH EVERY SIX (6) HOURS AS NEEDED FOR ANXIETY PRIOR TO/FOR FLYING 10/29/22  Yes Gertrudis Cintron MD   MAGNESIUM PO Take  by mouth. Yes Provider, Historical   dextroamphetamine-amphetamine (ADDERALL) 15 mg tablet Take 1 Tablet by mouth daily as needed (ADHD). With evening classes/work. 11/15/22  Yes Jenn Chang MD   dextroamphetamine-amphetamine (ADDERALL) 15 mg tablet Take 1 Tablet by mouth daily as needed (attention). With evening classes/work 10/16/22  Yes Jenn Chang MD   dextroamphetamine-amphetamine (ADDERALL) 15 mg tablet Take 1 Tablet by mouth daily as needed (ADHD). With evening classes/work. 9/16/22  Yes Jenn Chang MD   amphetamine-dextroamphetamine XR (ADDERALL XR) 30 mg XR capsule Take 1 Capsule by mouth daily. Max Daily Amount: 30 mg. 11/19/22  Yes Jenn Chang MD   amphetamine-dextroamphetamine XR (ADDERALL XR) 30 mg XR capsule Take 1 Capsule by mouth daily. Max Daily Amount: 30 mg. 10/20/22  Yes Jenn Chang MD   amphetamine-dextroamphetamine XR (ADDERALL XR) 30 mg XR capsule Take 1 Capsule by mouth daily. Max Daily Amount: 30 mg. 9/20/22  Yes Jenn Chang MD   fluticasone [facility]) 50 mcg/actuation nasal spray 2 Sprays by Both Nostrils route daily. 6/5/17  Yes Teri Hooks MD   fexofenadine (ALLEGRA) 180 mg tablet Take 1 Tab by mouth daily. 6/5/17  Yes Teri Hooks MD   Omeprazole delayed release (PRILOSEC D/R) 20 mg tablet Take 1 Tab by mouth daily. 1/6/16  Yes Teri Hooks MD   cyanocobalamin (B-12 DOTS) 500 mcg tablet Take 1 Tab by mouth daily. 6/2/15  Yes Teri Hooks MD   cholecalciferol (VITAMIN D3) 1,000 unit tablet Take 1 Tab by mouth daily. 6/2/15  Yes Teri Hooks MD         ROS    PHYSICAL EXAMINATION:    Vital Signs: There were no vitals taken for this visit.    Patient-Reported Vitals 12/15/2022   Patient-Reported Weight 111.5 lb   Patient-Reported Temperature -        Constitutional: [x] Appears well-developed and well-nourished [x] No apparent distress      Mental status: [x] Alert and awake  [x] Oriented [x] Able to follow commands       Eyes:   EOM    [x]  Normal      Sclera  [x]  Normal              Discharge [x]  None visible       HENT: [x] Normocephalic, atraumatic [x] Mouth/Throat: Mucous membranes are moist    External Ears [x] Normal      Neck: [x] No visualized mass     Pulmonary/Chest: [x] Respiratory effort normal   [x] No visualized signs of difficulty breathing or respiratory distress    Musculoskeletal:  [x] Normal range of motion of neck    Neurological:        [x] No Facial Asymmetry (Cranial nerve 7 motor function) (limited exam due to video visit)          [x] No gaze palsy     Skin:        [x] No significant exanthematous lesions or discoloration noted on facial skin             Psychiatric:       [x] Normal Affect       Other pertinent observable physical exam findings:  None. We discussed the expected course, resolution and complications of the diagnosis(es) in detail. Medication risks, benefits, costs, interactions, and alternatives were discussed as indicated. I advised him to contact the office if his condition worsens, changes or fails to improve as anticipated. He expressed understanding with the diagnosis(es) and plan. David Tapia is a 32 y.o. male who was evaluated by a video visit encounter for concerns as above. David Tapia is being evaluated by a Virtual Visit (video visit) encounter to address concerns as mentioned above. A caregiver was present when appropriate. Due to this being a TeleHealth encounter (During [place] [address] public [facility] emergency), evaluation of the following organ systems was limited: Vitals/Constitutional/EENT/Resp/CV/GI//MS/Neuro/Skin/Heme-Lymph-Imm. Pursuant to the emergency declaration under the [address] [place] [address] and the [employer] and [employer] Act, this Virtual Visit was conducted with patient's (and/or legal guardian's) consent, to reduce the patient's risk of exposure to COVID-19 and provide necessary medical care.   The patient (and/or legal guardian) has also been advised to contact this office for worsening conditions or problems, and seek emergency medical treatment and/or call 911 if deemed necessary. Patient identification was verified at the start of the visit: YES. Services were provided through a video synchronous discussion virtually to substitute for in-person clinic visit. Patient was located at their individual home (or other location as per patient preference). Provider was located in medical office. An electronic signature was used to authenticate this note.   -- Rafia Delcid MD

## 2023-02-20 DIAGNOSIS — F98.8 ATTENTION DEFICIT DISORDER, UNSPECIFIED HYPERACTIVITY PRESENCE: ICD-10-CM

## 2023-02-20 NOTE — TELEPHONE ENCOUNTER
Patient is requesting his refill on Aderall to be sent to [address] [place] [address] [facility]  because all of the other pharmacy are completely out of his medication

## 2023-02-22 NOTE — TELEPHONE ENCOUNTER
Patient called and stated his last refill for adderall cant be filled at current pharmacy. They are out of stock and can not transfer RX. Pt is asking for new Rx be sent to [facility] or provide a written RX so he can find a pharmacy that has medication.     Kurt Tracey LPN

## 2023-02-23 ENCOUNTER — TELEPHONE (OUTPATIENT)
Dept: INTERNAL MEDICINE CLINIC | Age: 28
End: 2023-02-23

## 2023-02-23 RX ORDER — DEXTROAMPHETAMINE SACCHARATE, AMPHETAMINE ASPARTATE MONOHYDRATE, DEXTROAMPHETAMINE SULFATE AND AMPHETAMINE SULFATE 7.5; 7.5; 7.5; 7.5 MG/1; MG/1; MG/1; MG/1
30 CAPSULE, EXTENDED RELEASE ORAL DAILY
Qty: 30 CAPSULE | Refills: 0 | Status: SHIPPED | OUTPATIENT
Start: 2023-02-23

## 2023-02-23 NOTE — TELEPHONE ENCOUNTER
Pt called in sayceasar Brown spoke to him about his adderall 30mg extended release medication and he was wondering if it was available for

## 2023-02-24 NOTE — TELEPHONE ENCOUNTER
Patient notified of new Rx sent to pharmacy. Will call back if written Rx is needed.     Daphnie Roman LPN

## 2023-02-24 NOTE — TELEPHONE ENCOUNTER
Adderall sent to [facility]--if he cannot fill there, can provide printed script 2-24-23.

## 2023-02-24 NOTE — TELEPHONE ENCOUNTER
Refill request(s) approved--Adderall--ER    Prescription Monitoring Program ([place]) database query with fills:      Requested Prescriptions     Signed Prescriptions Disp Refills    amphetamine-dextroamphetamine XR (ADDERALL XR) 30 mg XR capsule 30 Capsule 0     Sig: Take 1 Capsule by mouth daily. Max Daily Amount: 30 mg. Authorizing Provider: Sameer Pandya sent to [facility] (listed pharmacy) as requested.

## 2023-03-16 ENCOUNTER — OFFICE VISIT (OUTPATIENT)
Dept: INTERNAL MEDICINE CLINIC | Age: 28
End: 2023-03-16

## 2023-03-16 VITALS
OXYGEN SATURATION: 96 % | HEIGHT: 66 IN | DIASTOLIC BLOOD PRESSURE: 74 MMHG | WEIGHT: 121 LBS | SYSTOLIC BLOOD PRESSURE: 109 MMHG | TEMPERATURE: 97.9 F | HEART RATE: 89 BPM | RESPIRATION RATE: 16 BRPM | BODY MASS INDEX: 19.44 KG/M2

## 2023-03-16 DIAGNOSIS — F98.8 ATTENTION DEFICIT DISORDER, UNSPECIFIED HYPERACTIVITY PRESENCE: Primary | ICD-10-CM

## 2023-03-16 DIAGNOSIS — Z71.85 IMMUNIZATION COUNSELING: ICD-10-CM

## 2023-03-16 PROCEDURE — 99213 OFFICE O/P EST LOW 20 MIN: CPT | Performed by: INTERNAL MEDICINE

## 2023-03-16 RX ORDER — DEXTROAMPHETAMINE SACCHARATE, AMPHETAMINE ASPARTATE MONOHYDRATE, DEXTROAMPHETAMINE SULFATE AND AMPHETAMINE SULFATE 7.5; 7.5; 7.5; 7.5 MG/1; MG/1; MG/1; MG/1
30 CAPSULE, EXTENDED RELEASE ORAL DAILY
Qty: 30 CAPSULE | Refills: 0 | Status: SHIPPED | OUTPATIENT
Start: 2023-04-23

## 2023-03-16 RX ORDER — DEXTROAMPHETAMINE SACCHARATE, AMPHETAMINE ASPARTATE, DEXTROAMPHETAMINE SULFATE AND AMPHETAMINE SULFATE 3.75; 3.75; 3.75; 3.75 MG/1; MG/1; MG/1; MG/1
15 TABLET ORAL
Qty: 30 TABLET | Refills: 0 | Status: SHIPPED | OUTPATIENT
Start: 2023-04-15

## 2023-03-16 RX ORDER — DEXTROAMPHETAMINE SACCHARATE, AMPHETAMINE ASPARTATE MONOHYDRATE, DEXTROAMPHETAMINE SULFATE AND AMPHETAMINE SULFATE 7.5; 7.5; 7.5; 7.5 MG/1; MG/1; MG/1; MG/1
30 CAPSULE, EXTENDED RELEASE ORAL DAILY
Qty: 30 CAPSULE | Refills: 0 | Status: SHIPPED | OUTPATIENT
Start: 2023-05-23

## 2023-03-16 RX ORDER — DEXTROAMPHETAMINE SACCHARATE, AMPHETAMINE ASPARTATE, DEXTROAMPHETAMINE SULFATE AND AMPHETAMINE SULFATE 3.75; 3.75; 3.75; 3.75 MG/1; MG/1; MG/1; MG/1
15 TABLET ORAL
Qty: 30 TABLET | Refills: 0 | Status: SHIPPED | OUTPATIENT
Start: 2023-05-15

## 2023-03-16 RX ORDER — DEXTROAMPHETAMINE SACCHARATE, AMPHETAMINE ASPARTATE, DEXTROAMPHETAMINE SULFATE AND AMPHETAMINE SULFATE 3.75; 3.75; 3.75; 3.75 MG/1; MG/1; MG/1; MG/1
15 TABLET ORAL
Qty: 30 TABLET | Refills: 0 | Status: SHIPPED | OUTPATIENT
Start: 2023-03-16

## 2023-03-16 RX ORDER — DEXTROAMPHETAMINE SACCHARATE, AMPHETAMINE ASPARTATE, DEXTROAMPHETAMINE SULFATE AND AMPHETAMINE SULFATE 3.75; 3.75; 3.75; 3.75 MG/1; MG/1; MG/1; MG/1
15 TABLET ORAL
COMMUNITY

## 2023-03-16 RX ORDER — DEXTROAMPHETAMINE SACCHARATE, AMPHETAMINE ASPARTATE MONOHYDRATE, DEXTROAMPHETAMINE SULFATE AND AMPHETAMINE SULFATE 7.5; 7.5; 7.5; 7.5 MG/1; MG/1; MG/1; MG/1
30 CAPSULE, EXTENDED RELEASE ORAL DAILY
Qty: 30 CAPSULE | Refills: 0 | Status: SHIPPED | OUTPATIENT
Start: 2023-03-24

## 2023-03-16 NOTE — PATIENT INSTRUCTIONS
We can update your Tdap (Tetanus, diphtheria and acellular pertussis/\"whooping cough\") at your next visit as reviewed. This would be a 10-year booster. If you are interested in HPV (Human PapillomaVirus) vaccine, you can get in pharmacy, or here with future visits. You would need 3 doses over 6mo for this series.

## 2023-03-16 NOTE — PROGRESS NOTES
Nidia Cartagena (: 1995) is a 25 y.o. male, established patient, here for evaluation of the following chief complaint(s):  Chief Complaint   Patient presents with    Follow-up         Assessment and Plan:       ICD-10-CM ICD-9-CM    1. Attention deficit disorder, unspecified hyperactivity presence  F98.8 314.00 amphetamine-dextroamphetamine XR (ADDERALL XR) 30 mg XR capsule      dextroamphetamine-amphetamine (ADDERALL) 15 mg tablet      dextroamphetamine-amphetamine (ADDERALL) 15 mg tablet      amphetamine-dextroamphetamine XR (ADDERALL XR) 30 mg XR capsule      dextroamphetamine-amphetamine (ADDERALL) 15 mg tablet      amphetamine-dextroamphetamine XR (ADDERALL XR) 30 mg XR capsule      2. Immunization counseling  Z71.85 V65.49           Printed scripts reviewed for ADHD medications--due to problems with pharmacies not being able Adderall due to shortages. Follow-up and Dispositions    Return in about 3 months (around 2023) for medication follow-up, fasting labs, Tdap.       lab results and schedule of future lab studies reviewed with patient  reviewed medications and side effects in detail    For additional documentation of information and/or recommendations discussed this visit, please see notes in instructions. Plan and evaluation (above) reviewed with pt at visit  Patient voiced understanding of plan and provided with time to ask/review questions. After Visit Summary (AVS) provided to pt after visit with additional instructions as needed/reviewed. No future appointments. --Updated future visits after patient check-out. History of Present Illness:     Notes (nursing/rooming note copied below in italics):  As above      Notes no problems with ADHD medications. Prescription Monitoring Program ([place]) database query with fills:       Wt Readings from Last 3 Encounters:   23 121 lb (54.9 kg)   22 105 lb (47.6 kg)   22 123 lb (55.8 kg)     BP Readings from Last 3 Encounters:   03/16/23 109/74   07/19/22 118/83   01/14/22 107/70     Pulse Readings from Last 3 Encounters:   03/16/23 89   07/19/22 (!) 130   01/14/22 81     His weight on home scale is about 5 pounds lower. Reviewed currrent weight and BMI as above/below. Refills reviewed as above. Health Maintenance Due   Topic Date Due    Hepatitis C Screening  Never done    DTaP/Tdap/Td series (2 - Td or Tdap) 08/18/2016    COVID-19 Vaccine (4 - Booster for Pfizer series) 03/24/2022    Flu Vaccine (1) Never done     --[employer] (VIIS) form retrieved/reviewed. Updated with imported Jan 2022 COVID vaccine and 2006 Tdap. He prefers to update Tdap at a future visit here. Plans next visit in-office to do fasting labs then. Not fasting today and prefers to update with lipids  as reviewed. Nursing screenings reviewed by provider at visit. Prior to Admission medications    Medication Sig Start Date End Date Taking? Authorizing Provider   dextroamphetamine-amphetamine (AdderalL) 15 mg tablet Take 15 mg by mouth. Yes Provider, Historical   amphetamine-dextroamphetamine XR (ADDERALL XR) 30 mg XR capsule Take 1 Capsule by mouth daily. Max Daily Amount: 30 mg. 2/23/23  Yes Catracho Aguirre MD   guaiFENesin (Mucinex) 1,200 mg Ta12 ER tablet Take 1,200 mg by mouth two (2) times a day. Yes Provider, Historical   multivitamin (ONE A DAY) tablet Take 1 Tablet by mouth daily. Yes Provider, Historical   dextroamphetamine-amphetamine (ADDERALL) 15 mg tablet Take 1 Tablet by mouth daily as needed (ADHD). With evening classes/work. 12/15/22  Yes Catracho Aguirre MD   dextroamphetamine-amphetamine (ADDERALL) 15 mg tablet Take 1 Tablet by mouth daily as needed (attention). With evening classes/work 1/14/23  Yes Catracho Aguirre MD   amphetamine-dextroamphetamine XR (ADDERALL XR) 30 mg XR capsule Take 1 Capsule by mouth daily.  Max Daily Amount: 30 mg. 1/19/23  Yes Helena Bryant MD   dextroamphetamine-amphetamine (ADDERALL) 15 mg tablet Take 1 Tablet by mouth daily as needed (ADHD). With evening classes/work. 2/13/23  Yes Helena Bryant MD   amphetamine-dextroamphetamine XR (ADDERALL XR) 30 mg XR capsule Take 1 Capsule by mouth daily. Max Daily Amount: 30 mg. 2/18/23  Yes Helena Bryant MD   ALPRAZolam Monna Pressman) 0.25 mg tablet TAKE 1-2 TABLETS BY MOUTH EVERY SIX (6) HOURS AS NEEDED FOR ANXIETY PRIOR TO/FOR FLYING 10/29/22  Yes Helena Bryant MD   MAGNESIUM PO Take  by mouth. Yes Provider, Historical   fluticasone (FLONASE) 50 mcg/actuation nasal spray 2 Sprays by Both Nostrils route daily. 6/5/17  Yes Neena Gallegos MD   fexofenadine (ALLEGRA) 180 mg tablet Take 1 Tab by mouth daily. 6/5/17  Yes Neena Gallegos MD   cyanocobalamin (B-12 DOTS) 500 mcg tablet Take 1 Tab by mouth daily. 6/2/15  Yes Neena Gallegos MD   cholecalciferol (VITAMIN D3) 1,000 unit tablet Take 1 Tab by mouth daily. 6/2/15  Yes Neena Gallegos MD   acetaminophen (TYLENOL) 325 mg tablet Take  by mouth every four (4) hours as needed for Pain. Patient not taking: Reported on 3/16/2023    Provider, Historical   Omeprazole delayed release (PRILOSEC D/R) 20 mg tablet Take 1 Tab by mouth daily. Patient not taking: Reported on 3/16/2023 1/6/16   Neena Gallegos MD        ROS    Vitals:    03/16/23 1044   BP: 109/74   Pulse: 89   Resp: 16   Temp: 97.9 °F (36.6 °C)   TempSrc: Oral   SpO2: 96%   Weight: 121 lb (54.9 kg)   Height: 5' 6\" (1.676 m)      Body mass index is 19.53 kg/m². Physical Exam:     Physical Exam  Vitals and nursing note reviewed. Constitutional:       General: He is not in acute distress. Appearance: Normal appearance. He is well-developed. He is not diaphoretic. HENT:      Head: Normocephalic and atraumatic. Mouth/Throat:      Mouth: Mucous membranes are moist.   Eyes:      General: No scleral icterus.         Right eye: No discharge. Left eye: No discharge. Conjunctiva/sclera: Conjunctivae normal.   Cardiovascular:      Rate and Rhythm: Normal rate and regular rhythm. Pulses: Normal pulses. Heart sounds: Normal heart sounds. No murmur heard. No friction rub. No gallop. Pulmonary:      Effort: Pulmonary effort is normal. No respiratory distress. Breath sounds: Normal breath sounds. No stridor. No wheezing, rhonchi or rales. Abdominal:      General: Bowel sounds are normal. There is no distension. Palpations: Abdomen is soft. Tenderness: There is no abdominal tenderness. There is no guarding or rebound. Musculoskeletal:         General: No swelling, tenderness or deformity. Right lower leg: No edema. Left lower leg: No edema. Skin:     General: Skin is warm. Coloration: Skin is not jaundiced or pale. Findings: No bruising, erythema or rash. Neurological:      General: No focal deficit present. Mental Status: He is alert. Motor: No abnormal muscle tone. Coordination: Coordination normal.      Gait: Gait normal.   Psychiatric:         Mood and Affect: Mood normal.         Behavior: Behavior normal.         Thought Content: Thought content normal.         Judgment: Judgment normal.       An electronic signature was used to authenticate this note.   -- Ramses Woodard MD

## 2023-03-16 NOTE — PROGRESS NOTES
Kinsey Pal is a 25 y.o. male    Chief Complaint   Patient presents with    Follow-up       Visit Vitals  /74 (BP 1 Location: Left upper arm, BP Patient Position: Sitting, BP Cuff Size: Adult)   Pulse 89   Temp 97.9 °F (36.6 °C) (Oral)   Resp 16   Ht 5' 6\" (1.676 m)   Wt 121 lb (54.9 kg)   SpO2 96%   BMI 19.53 kg/m²           1. Have you been to the ER, urgent care clinic since your last visit? Hospitalized since your last visit? NO    2. Have you seen or consulted any other health care providers outside of the [address] [place] [address] since your last visit? Include any pap smears or colon screening.  NO

## 2023-03-27 DIAGNOSIS — F41.9 ANXIETY: ICD-10-CM

## 2023-04-02 RX ORDER — ALPRAZOLAM 0.25 MG/1
TABLET ORAL
Qty: 15 TABLET | Refills: 0 | Status: SHIPPED | OUTPATIENT
Start: 2023-04-02

## 2023-04-02 NOTE — TELEPHONE ENCOUNTER
Refill request(s) approved--alprazolam.    Prescription Monitoring Program ([place]) database query with fills:      Requested Prescriptions     Signed Prescriptions Disp Refills    ALPRAZolam (XANAX) 0.25 mg tablet 15 Tablet 0     Sig: TAKE 1-2 TABLETS BY MOUTH EVERY SIX (6) HOURS AS NEEDED FOR ANXIETY PRIOR TO/FOR FLYING     Authorizing Provider: Sherlyn Pang

## 2023-05-19 RX ORDER — GUAIFENESIN 1200 MG/1
1200 TABLET, EXTENDED RELEASE ORAL 2 TIMES DAILY
COMMUNITY

## 2023-05-19 RX ORDER — ALPRAZOLAM 0.25 MG/1
TABLET ORAL
COMMUNITY
Start: 2023-04-02 | End: 2023-07-23

## 2023-05-19 RX ORDER — DEXTROAMPHETAMINE SACCHARATE, AMPHETAMINE ASPARTATE MONOHYDRATE, DEXTROAMPHETAMINE SULFATE AND AMPHETAMINE SULFATE 7.5; 7.5; 7.5; 7.5 MG/1; MG/1; MG/1; MG/1
30 CAPSULE, EXTENDED RELEASE ORAL DAILY
COMMUNITY
Start: 2023-03-24 | End: 2023-06-11 | Stop reason: SDUPTHER

## 2023-05-19 RX ORDER — ACETAMINOPHEN 325 MG/1
TABLET ORAL EVERY 4 HOURS PRN
COMMUNITY

## 2023-05-19 RX ORDER — FEXOFENADINE HCL 180 MG/1
180 TABLET ORAL DAILY
COMMUNITY
Start: 2017-06-05

## 2023-05-19 RX ORDER — DEXTROAMPHETAMINE SACCHARATE, AMPHETAMINE ASPARTATE, DEXTROAMPHETAMINE SULFATE AND AMPHETAMINE SULFATE 3.75; 3.75; 3.75; 3.75 MG/1; MG/1; MG/1; MG/1
15 TABLET ORAL DAILY PRN
COMMUNITY
Start: 2023-03-16 | End: 2023-06-11 | Stop reason: SDUPTHER

## 2023-05-19 RX ORDER — FLUTICASONE PROPIONATE 50 MCG
2 SPRAY, SUSPENSION (ML) NASAL DAILY
COMMUNITY
Start: 2017-06-05

## 2023-05-19 RX ORDER — OMEPRAZOLE 20 MG/1
20 TABLET, DELAYED RELEASE ORAL DAILY
COMMUNITY
Start: 2016-01-06

## 2023-06-09 DIAGNOSIS — F90.9 ATTENTION DEFICIT HYPERACTIVITY DISORDER (ADHD), UNSPECIFIED ADHD TYPE: Primary | ICD-10-CM

## 2023-06-09 NOTE — TELEPHONE ENCOUNTER
Last appointment: 03/16/2023 MD Sherly Villarreal   Next appointment: 07/13/2023 MD Sherly Villarreal   Previous refill encounter(s):   05/15/2023 Adderall #30,   05/23/2023 Adderall-XR #30    Previous prescriptions was sent to \"[facility]\". Pt is requesting new prescription to be sent to [facility]     No access to [place] [id number] Only    Program: Medication Refill  Intervention Detail: New Rx: 2, reason: Patient Preference  Time Spent (min): 5      Requested Prescriptions     Pending Prescriptions Disp Refills    amphetamine-dextroamphetamine (ADDERALL XR) 30 MG extended release capsule 30 capsule 0     Sig: Take 1 capsule by mouth daily. Max Daily Amount: 30 mg    amphetamine-dextroamphetamine (ADDERALL) 15 MG tablet 30 tablet 0     Sig: Take 1 Tablet by mouth daily as needed (attention). With evening classes/work       ----- Message from Kat Mtz sent at 6/9/2023 12:56 PM EDT -----  Subject: Refill Request    QUESTIONS  Name of Medication? amphetamine-dextroamphetamine (ADDERALL) 15 MG tablet  Patient-reported dosage and instructions? 15mg tab once daily  How many days do you have left? 5  Preferred Pharmacy? [facility] #[id number]  Pharmacy phone number (if available)? [phone]  ---------------------------------------------------------------------------  --------------,  Name of Medication? amphetamine-dextroamphetamine (ADDERALL XR) 30 MG   extended release capsule  Patient-reported dosage and instructions? 30mg ext rel cap once daily  How many days do you have left? 13  Preferred Pharmacy? [facility] #[id number]  Pharmacy phone number (if available)? [phone]  Additional Information for Provider? Patient scheduled next available   appointment on 7/13 - will need medication before then  ---------------------------------------------------------------------------  --------------  [address]  What is the best way for the office to contact you?  OK to leave message on   voicemail  Preferred Call Back

## 2023-06-11 RX ORDER — DEXTROAMPHETAMINE SACCHARATE, AMPHETAMINE ASPARTATE, DEXTROAMPHETAMINE SULFATE AND AMPHETAMINE SULFATE 3.75; 3.75; 3.75; 3.75 MG/1; MG/1; MG/1; MG/1
TABLET ORAL
Qty: 30 TABLET | Refills: 0 | Status: SHIPPED | OUTPATIENT
Start: 2023-06-14 | End: 2023-07-14

## 2023-06-11 RX ORDER — DEXTROAMPHETAMINE SACCHARATE, AMPHETAMINE ASPARTATE MONOHYDRATE, DEXTROAMPHETAMINE SULFATE AND AMPHETAMINE SULFATE 7.5; 7.5; 7.5; 7.5 MG/1; MG/1; MG/1; MG/1
30 CAPSULE, EXTENDED RELEASE ORAL DAILY
Qty: 30 CAPSULE | Refills: 0 | Status: SHIPPED | OUTPATIENT
Start: 2023-06-22 | End: 2023-07-22

## 2023-07-12 DIAGNOSIS — F90.9 ATTENTION DEFICIT HYPERACTIVITY DISORDER (ADHD), UNSPECIFIED ADHD TYPE: ICD-10-CM

## 2023-07-14 DIAGNOSIS — F41.9 ANXIETY DISORDER, UNSPECIFIED: ICD-10-CM

## 2023-07-17 ENCOUNTER — TELEPHONE (OUTPATIENT)
Age: 28
End: 2023-07-17

## 2023-07-17 NOTE — TELEPHONE ENCOUNTER
Pt is asking for a refill on his medication Adderall XR 15mg and 30mg extended his appt was cancelled and ladonna called to reschedule it to [phone] pt is states he is out of 15mg medication going on 3days.  But need refill in the 30mg before his appt

## 2023-07-21 RX ORDER — DEXTROAMPHETAMINE SACCHARATE, AMPHETAMINE ASPARTATE, DEXTROAMPHETAMINE SULFATE AND AMPHETAMINE SULFATE 3.75; 3.75; 3.75; 3.75 MG/1; MG/1; MG/1; MG/1
TABLET ORAL
Qty: 30 TABLET | Refills: 0 | Status: SHIPPED | OUTPATIENT
Start: 2023-07-21 | End: 2023-08-11

## 2023-07-21 RX ORDER — DEXTROAMPHETAMINE SACCHARATE, AMPHETAMINE ASPARTATE MONOHYDRATE, DEXTROAMPHETAMINE SULFATE AND AMPHETAMINE SULFATE 7.5; 7.5; 7.5; 7.5 MG/1; MG/1; MG/1; MG/1
30 CAPSULE, EXTENDED RELEASE ORAL DAILY
Qty: 30 CAPSULE | Refills: 0 | Status: SHIPPED | OUTPATIENT
Start: 2023-07-21 | End: 2023-08-20

## 2023-07-22 NOTE — TELEPHONE ENCOUNTER
Refill request(s) approved--Adderall. Prescription Monitoring Program ([place]) database query with fills:        Requested Prescriptions     Signed Prescriptions Disp Refills    amphetamine-dextroamphetamine (ADDERALL XR) 30 MG extended release capsule 30 capsule 0     Sig: Take 1 capsule by mouth daily for 30 days. Max Daily Amount: 30 mg     Authorizing Provider: Marjorie Elizabeth    amphetamine-dextroamphetamine (ADDERALL) 15 MG tablet 30 tablet 0     Sig: Take 1 Tablet by mouth daily as needed (attention).  With evening classes/work     Authorizing Provider: Marjorie Elizabeth

## 2023-07-23 RX ORDER — ALPRAZOLAM 0.25 MG/1
TABLET ORAL
Qty: 15 TABLET | Refills: 1 | Status: SHIPPED | OUTPATIENT
Start: 2023-07-23 | End: 2023-10-21

## 2023-07-28 ENCOUNTER — TELEMEDICINE (OUTPATIENT)
Age: 28
End: 2023-07-28

## 2023-07-28 DIAGNOSIS — F90.9 ATTENTION DEFICIT HYPERACTIVITY DISORDER (ADHD), UNSPECIFIED ADHD TYPE: Primary | ICD-10-CM

## 2023-07-28 DIAGNOSIS — E55.9 HYPOVITAMINOSIS D: ICD-10-CM

## 2023-07-28 DIAGNOSIS — E53.8 LOW SERUM VITAMIN B12: ICD-10-CM

## 2023-07-28 DIAGNOSIS — Z00.00 WELL ADULT HEALTH CHECK: ICD-10-CM

## 2023-07-28 PROCEDURE — 99214 OFFICE O/P EST MOD 30 MIN: CPT | Performed by: INTERNAL MEDICINE

## 2023-07-28 RX ORDER — DEXTROAMPHETAMINE SACCHARATE, AMPHETAMINE ASPARTATE MONOHYDRATE, DEXTROAMPHETAMINE SULFATE AND AMPHETAMINE SULFATE 7.5; 7.5; 7.5; 7.5 MG/1; MG/1; MG/1; MG/1
30 CAPSULE, EXTENDED RELEASE ORAL DAILY
Qty: 30 CAPSULE | Refills: 0 | Status: SHIPPED | OUTPATIENT
Start: 2023-10-20 | End: 2023-11-19

## 2023-07-28 RX ORDER — DEXTROAMPHETAMINE SACCHARATE, AMPHETAMINE ASPARTATE MONOHYDRATE, DEXTROAMPHETAMINE SULFATE AND AMPHETAMINE SULFATE 7.5; 7.5; 7.5; 7.5 MG/1; MG/1; MG/1; MG/1
30 CAPSULE, EXTENDED RELEASE ORAL DAILY
Qty: 30 CAPSULE | Refills: 0 | Status: SHIPPED | OUTPATIENT
Start: 2023-09-20 | End: 2023-10-20

## 2023-07-28 RX ORDER — DEXTROAMPHETAMINE SACCHARATE, AMPHETAMINE ASPARTATE, DEXTROAMPHETAMINE SULFATE AND AMPHETAMINE SULFATE 3.75; 3.75; 3.75; 3.75 MG/1; MG/1; MG/1; MG/1
TABLET ORAL
Qty: 30 TABLET | Refills: 0 | Status: SHIPPED | OUTPATIENT
Start: 2023-10-20 | End: 2023-11-19

## 2023-07-28 RX ORDER — DEXTROAMPHETAMINE SACCHARATE, AMPHETAMINE ASPARTATE MONOHYDRATE, DEXTROAMPHETAMINE SULFATE AND AMPHETAMINE SULFATE 7.5; 7.5; 7.5; 7.5 MG/1; MG/1; MG/1; MG/1
30 CAPSULE, EXTENDED RELEASE ORAL DAILY
Qty: 30 CAPSULE | Refills: 0 | Status: SHIPPED | OUTPATIENT
Start: 2023-08-21 | End: 2023-09-20

## 2023-07-28 RX ORDER — DEXTROAMPHETAMINE SACCHARATE, AMPHETAMINE ASPARTATE, DEXTROAMPHETAMINE SULFATE AND AMPHETAMINE SULFATE 3.75; 3.75; 3.75; 3.75 MG/1; MG/1; MG/1; MG/1
TABLET ORAL
Qty: 30 TABLET | Refills: 0 | Status: SHIPPED | OUTPATIENT
Start: 2023-08-21 | End: 2023-09-20

## 2023-07-28 RX ORDER — DEXTROAMPHETAMINE SACCHARATE, AMPHETAMINE ASPARTATE, DEXTROAMPHETAMINE SULFATE AND AMPHETAMINE SULFATE 3.75; 3.75; 3.75; 3.75 MG/1; MG/1; MG/1; MG/1
TABLET ORAL
Qty: 30 TABLET | Refills: 0 | Status: SHIPPED | OUTPATIENT
Start: 2023-09-20 | End: 2023-10-20

## 2023-07-28 SDOH — ECONOMIC STABILITY: TRANSPORTATION INSECURITY
IN THE PAST 12 MONTHS, HAS LACK OF TRANSPORTATION KEPT YOU FROM MEETINGS, WORK, OR FROM GETTING THINGS NEEDED FOR DAILY LIVING?: NO

## 2023-07-28 SDOH — ECONOMIC STABILITY: FOOD INSECURITY: WITHIN THE PAST 12 MONTHS, THE FOOD YOU BOUGHT JUST DIDN'T LAST AND YOU DIDN'T HAVE MONEY TO GET MORE.: NEVER TRUE

## 2023-07-28 SDOH — ECONOMIC STABILITY: HOUSING INSECURITY
IN THE LAST 12 MONTHS, WAS THERE A TIME WHEN YOU DID NOT HAVE A STEADY PLACE TO SLEEP OR SLEPT IN A SHELTER (INCLUDING NOW)?: NO

## 2023-07-28 SDOH — ECONOMIC STABILITY: FOOD INSECURITY: WITHIN THE PAST 12 MONTHS, YOU WORRIED THAT YOUR FOOD WOULD RUN OUT BEFORE YOU GOT MONEY TO BUY MORE.: NEVER TRUE

## 2023-07-28 SDOH — ECONOMIC STABILITY: INCOME INSECURITY: HOW HARD IS IT FOR YOU TO PAY FOR THE VERY BASICS LIKE FOOD, HOUSING, MEDICAL CARE, AND HEATING?: NOT VERY HARD

## 2023-07-28 ASSESSMENT — PATIENT HEALTH QUESTIONNAIRE - PHQ9
SUM OF ALL RESPONSES TO PHQ9 QUESTIONS 1 & 2: 0
SUM OF ALL RESPONSES TO PHQ QUESTIONS 1-9: 0
SUM OF ALL RESPONSES TO PHQ QUESTIONS 1-9: 0
1. LITTLE INTEREST OR PLEASURE IN DOING THINGS: 0
SUM OF ALL RESPONSES TO PHQ QUESTIONS 1-9: 0
2. FEELING DOWN, DEPRESSED OR HOPELESS: 0
SUM OF ALL RESPONSES TO PHQ QUESTIONS 1-9: 0

## 2023-07-28 NOTE — PROGRESS NOTES
[id number]    Provider was located at [facility] (Appt Dept): [address] [facility],  [address]      Total time spent on this encounter: Not billed by time      --Belkys Barajas MD on 7/28/2023 at 5:30 PM  An electronic signature was used to authenticate this note.

## 2023-10-17 ENCOUNTER — TELEMEDICINE (OUTPATIENT)
Age: 28
End: 2023-10-17

## 2023-10-17 DIAGNOSIS — F90.9 ATTENTION DEFICIT HYPERACTIVITY DISORDER (ADHD), UNSPECIFIED ADHD TYPE: ICD-10-CM

## 2023-10-17 PROCEDURE — 99213 OFFICE O/P EST LOW 20 MIN: CPT | Performed by: INTERNAL MEDICINE

## 2023-10-17 RX ORDER — DEXTROAMPHETAMINE SACCHARATE, AMPHETAMINE ASPARTATE MONOHYDRATE, DEXTROAMPHETAMINE SULFATE AND AMPHETAMINE SULFATE 7.5; 7.5; 7.5; 7.5 MG/1; MG/1; MG/1; MG/1
30 CAPSULE, EXTENDED RELEASE ORAL DAILY
Qty: 30 CAPSULE | Refills: 0 | Status: SHIPPED | OUTPATIENT
Start: 2023-12-16 | End: 2023-10-22 | Stop reason: SDUPTHER

## 2023-10-17 RX ORDER — DEXTROAMPHETAMINE SACCHARATE, AMPHETAMINE ASPARTATE, DEXTROAMPHETAMINE SULFATE AND AMPHETAMINE SULFATE 3.75; 3.75; 3.75; 3.75 MG/1; MG/1; MG/1; MG/1
TABLET ORAL
Qty: 30 TABLET | Refills: 0 | Status: SHIPPED | OUTPATIENT
Start: 2023-12-16 | End: 2023-10-22 | Stop reason: SDUPTHER

## 2023-10-17 RX ORDER — DEXTROAMPHETAMINE SACCHARATE, AMPHETAMINE ASPARTATE, DEXTROAMPHETAMINE SULFATE AND AMPHETAMINE SULFATE 3.75; 3.75; 3.75; 3.75 MG/1; MG/1; MG/1; MG/1
TABLET ORAL
Qty: 30 TABLET | Refills: 0 | Status: SHIPPED | OUTPATIENT
Start: 2023-10-17 | End: 2023-10-22 | Stop reason: SDUPTHER

## 2023-10-17 RX ORDER — DEXTROAMPHETAMINE SACCHARATE, AMPHETAMINE ASPARTATE MONOHYDRATE, DEXTROAMPHETAMINE SULFATE AND AMPHETAMINE SULFATE 7.5; 7.5; 7.5; 7.5 MG/1; MG/1; MG/1; MG/1
30 CAPSULE, EXTENDED RELEASE ORAL DAILY
Qty: 30 CAPSULE | Refills: 0 | Status: SHIPPED | OUTPATIENT
Start: 2023-10-20 | End: 2023-10-22 | Stop reason: SDUPTHER

## 2023-10-17 RX ORDER — DEXTROAMPHETAMINE SACCHARATE, AMPHETAMINE ASPARTATE MONOHYDRATE, DEXTROAMPHETAMINE SULFATE AND AMPHETAMINE SULFATE 7.5; 7.5; 7.5; 7.5 MG/1; MG/1; MG/1; MG/1
30 CAPSULE, EXTENDED RELEASE ORAL DAILY
Qty: 30 CAPSULE | Refills: 0 | Status: SHIPPED | OUTPATIENT
Start: 2023-11-16 | End: 2023-10-22 | Stop reason: SDUPTHER

## 2023-10-17 ASSESSMENT — PATIENT HEALTH QUESTIONNAIRE - PHQ9
SUM OF ALL RESPONSES TO PHQ9 QUESTIONS 1 & 2: 0
SUM OF ALL RESPONSES TO PHQ QUESTIONS 1-9: 0
1. LITTLE INTEREST OR PLEASURE IN DOING THINGS: 0
2. FEELING DOWN, DEPRESSED OR HOPELESS: 0
SUM OF ALL RESPONSES TO PHQ QUESTIONS 1-9: 0

## 2023-10-17 NOTE — PROGRESS NOTES
10/17/2023    TELEHEALTH EVALUATION -- Audio/Visual    ASSESSMENT/PLAN:   Diagnosis Orders   1. Attention deficit hyperactivity disorder (ADHD), unspecified ADHD type  amphetamine-dextroamphetamine (ADDERALL XR) 30 MG extended release capsule    amphetamine-dextroamphetamine (ADDERALL) 15 MG tablet    amphetamine-dextroamphetamine (ADDERALL) 15 MG tablet    amphetamine-dextroamphetamine (ADDERALL XR) 30 MG extended release capsule    amphetamine-dextroamphetamine (ADDERALL) 15 MG tablet    amphetamine-dextroamphetamine (ADDERALL XR) 30 MG extended release capsule                                                      1:  Refill(s) and management reviewed. Scripts planned to be sent after VV, when he confirmed pharmacy had his medications for filling. Initially scripts printed/didn't eRx, then re-sent each script. Return in about 2 months (around 2023) for medication follow-up, fasting labs (in-office visit)--2-3mo. lab results and schedule of future lab studies reviewed with patient  reviewed diet, exercise and weight control  reviewed medications and side effects in detail    Plan and evaluation (above) reviewed with pt at visit  Patient voiced understanding of plan and provided with time to ask/review questions. After Visit Summary (AVS) provided to pt after visit with additional instructions as needed/reviewed. AVS:  [x]  Available to patient in MyChart after visit signed. []  Mailed to patient after visit. []  Not sent to patient after visit. Future Appointments   Date Time Provider [address] [place] [address]   10/17/2023  4:30 PM Chidi Warner MD [facility]   --Updated future visits after patient check-out. HPI:    Nazia Harman (:  1995) has requested an audio/video evaluation for the following concern(s):  Chief Complaint   Patient presents with    Follow-up     Notes no problems with ADHD medications.     Prescription Monitoring Program ([place]) database query with

## 2023-10-17 NOTE — PROGRESS NOTES
Rm: VV    Chief Complaint   Patient presents with    Follow-up        1. Have you been to the ER, urgent care clinic since your last visit? Hospitalized since your last visit?no    2. Have you seen or consulted any other health care providers outside of the [address] [place] [address] since your last visit? Include any pap smears or colon screening. no        Social Determinants of Health     Tobacco Use: Medium Risk (7/28/2023)    Patient History     Smoking Tobacco Use: Former     Smokeless Tobacco Use: Never     Passive Exposure: Not on file   Alcohol Use: Not on file   Financial Resource Strain: Low Risk  (7/28/2023)    Overall Financial Resource Strain (CARDIA)     Difficulty of Paying Living Expenses: Not very hard   Food Insecurity: No Food Insecurity (7/28/2023)    Hunger Vital Sign     Worried About Running Out of Food in the Last Year: Never true     [address] in the Last Year: Never true   Transportation Needs: Unknown (7/28/2023)    PRAPARE - Transportation     Lack of Transportation (Medical): Not on file     Lack of Transportation (Non-Medical):  No   Physical Activity: Not on file   Stress: Not on file   Social Connections: Not on file   Intimate Partner Violence: Not on file   Depression: Not at risk (10/17/2023)    PHQ-2     PHQ-2 Score: 0   Housing Stability: Unknown (7/28/2023)    Housing Stability Vital Sign     Unable to Pay for Housing in the Last Year: Not on file     Number of Places Lived in the Last Year: Not on file     Unstable Housing in the Last Year: No

## 2023-10-22 RX ORDER — DEXTROAMPHETAMINE SACCHARATE, AMPHETAMINE ASPARTATE, DEXTROAMPHETAMINE SULFATE AND AMPHETAMINE SULFATE 3.75; 3.75; 3.75; 3.75 MG/1; MG/1; MG/1; MG/1
TABLET ORAL
Qty: 30 TABLET | Refills: 0 | Status: SHIPPED | OUTPATIENT
Start: 2023-10-22 | End: 2023-11-19

## 2023-10-22 RX ORDER — DEXTROAMPHETAMINE SACCHARATE, AMPHETAMINE ASPARTATE, DEXTROAMPHETAMINE SULFATE AND AMPHETAMINE SULFATE 3.75; 3.75; 3.75; 3.75 MG/1; MG/1; MG/1; MG/1
TABLET ORAL
Qty: 30 TABLET | Refills: 0 | Status: SHIPPED | OUTPATIENT
Start: 2023-11-21 | End: 2023-12-21

## 2023-10-22 RX ORDER — DEXTROAMPHETAMINE SACCHARATE, AMPHETAMINE ASPARTATE, DEXTROAMPHETAMINE SULFATE AND AMPHETAMINE SULFATE 3.75; 3.75; 3.75; 3.75 MG/1; MG/1; MG/1; MG/1
TABLET ORAL
Qty: 30 TABLET | Refills: 0 | Status: SHIPPED | OUTPATIENT
Start: 2023-12-21 | End: 2024-01-15

## 2023-10-22 RX ORDER — DEXTROAMPHETAMINE SACCHARATE, AMPHETAMINE ASPARTATE MONOHYDRATE, DEXTROAMPHETAMINE SULFATE AND AMPHETAMINE SULFATE 7.5; 7.5; 7.5; 7.5 MG/1; MG/1; MG/1; MG/1
30 CAPSULE, EXTENDED RELEASE ORAL DAILY
Qty: 30 CAPSULE | Refills: 0 | Status: SHIPPED | OUTPATIENT
Start: 2023-11-16 | End: 2023-10-22 | Stop reason: SDUPTHER

## 2023-10-22 RX ORDER — DEXTROAMPHETAMINE SACCHARATE, AMPHETAMINE ASPARTATE MONOHYDRATE, DEXTROAMPHETAMINE SULFATE AND AMPHETAMINE SULFATE 7.5; 7.5; 7.5; 7.5 MG/1; MG/1; MG/1; MG/1
30 CAPSULE, EXTENDED RELEASE ORAL DAILY
Qty: 30 CAPSULE | Refills: 0 | Status: SHIPPED | OUTPATIENT
Start: 2023-11-21 | End: 2023-12-21

## 2023-10-22 RX ORDER — DEXTROAMPHETAMINE SACCHARATE, AMPHETAMINE ASPARTATE, DEXTROAMPHETAMINE SULFATE AND AMPHETAMINE SULFATE 3.75; 3.75; 3.75; 3.75 MG/1; MG/1; MG/1; MG/1
TABLET ORAL
Qty: 30 TABLET | Refills: 0 | Status: SHIPPED | OUTPATIENT
Start: 2023-10-22 | End: 2023-10-22 | Stop reason: SDUPTHER

## 2023-10-22 RX ORDER — DEXTROAMPHETAMINE SACCHARATE, AMPHETAMINE ASPARTATE MONOHYDRATE, DEXTROAMPHETAMINE SULFATE AND AMPHETAMINE SULFATE 7.5; 7.5; 7.5; 7.5 MG/1; MG/1; MG/1; MG/1
30 CAPSULE, EXTENDED RELEASE ORAL DAILY
Qty: 30 CAPSULE | Refills: 0 | Status: SHIPPED | OUTPATIENT
Start: 2023-10-22 | End: 2023-11-21

## 2023-10-22 RX ORDER — DEXTROAMPHETAMINE SACCHARATE, AMPHETAMINE ASPARTATE, DEXTROAMPHETAMINE SULFATE AND AMPHETAMINE SULFATE 3.75; 3.75; 3.75; 3.75 MG/1; MG/1; MG/1; MG/1
TABLET ORAL
Qty: 30 TABLET | Refills: 0 | Status: SHIPPED | OUTPATIENT
Start: 2023-12-16 | End: 2023-10-22 | Stop reason: SDUPTHER

## 2023-10-22 RX ORDER — DEXTROAMPHETAMINE SACCHARATE, AMPHETAMINE ASPARTATE MONOHYDRATE, DEXTROAMPHETAMINE SULFATE AND AMPHETAMINE SULFATE 7.5; 7.5; 7.5; 7.5 MG/1; MG/1; MG/1; MG/1
30 CAPSULE, EXTENDED RELEASE ORAL DAILY
Qty: 30 CAPSULE | Refills: 0 | Status: SHIPPED | OUTPATIENT
Start: 2023-10-22 | End: 2023-10-22 | Stop reason: SDUPTHER

## 2023-10-22 RX ORDER — DEXTROAMPHETAMINE SACCHARATE, AMPHETAMINE ASPARTATE MONOHYDRATE, DEXTROAMPHETAMINE SULFATE AND AMPHETAMINE SULFATE 7.5; 7.5; 7.5; 7.5 MG/1; MG/1; MG/1; MG/1
30 CAPSULE, EXTENDED RELEASE ORAL DAILY
Qty: 30 CAPSULE | Refills: 0 | Status: SHIPPED | OUTPATIENT
Start: 2023-12-21 | End: 2024-01-20

## 2023-10-22 RX ORDER — DEXTROAMPHETAMINE SACCHARATE, AMPHETAMINE ASPARTATE, DEXTROAMPHETAMINE SULFATE AND AMPHETAMINE SULFATE 3.75; 3.75; 3.75; 3.75 MG/1; MG/1; MG/1; MG/1
TABLET ORAL
Qty: 30 TABLET | Refills: 0 | Status: SHIPPED | OUTPATIENT
Start: 2023-11-16 | End: 2023-10-22 | Stop reason: SDUPTHER

## 2023-10-22 RX ORDER — DEXTROAMPHETAMINE SACCHARATE, AMPHETAMINE ASPARTATE MONOHYDRATE, DEXTROAMPHETAMINE SULFATE AND AMPHETAMINE SULFATE 7.5; 7.5; 7.5; 7.5 MG/1; MG/1; MG/1; MG/1
30 CAPSULE, EXTENDED RELEASE ORAL DAILY
Qty: 30 CAPSULE | Refills: 0 | Status: SHIPPED | OUTPATIENT
Start: 2023-12-16 | End: 2023-10-22 | Stop reason: SDUPTHER

## 2024-01-22 ENCOUNTER — TELEMEDICINE (OUTPATIENT)
Age: 29
End: 2024-01-22

## 2024-01-22 DIAGNOSIS — F41.9 ANXIETY DISORDER, UNSPECIFIED TYPE: ICD-10-CM

## 2024-01-22 DIAGNOSIS — F90.9 ATTENTION DEFICIT HYPERACTIVITY DISORDER (ADHD), UNSPECIFIED ADHD TYPE: Primary | ICD-10-CM

## 2024-01-22 DIAGNOSIS — U07.1 COVID-19: ICD-10-CM

## 2024-01-22 PROCEDURE — 99214 OFFICE O/P EST MOD 30 MIN: CPT | Performed by: INTERNAL MEDICINE

## 2024-01-22 RX ORDER — DEXTROAMPHETAMINE SACCHARATE, AMPHETAMINE ASPARTATE MONOHYDRATE, DEXTROAMPHETAMINE SULFATE AND AMPHETAMINE SULFATE 7.5; 7.5; 7.5; 7.5 MG/1; MG/1; MG/1; MG/1
30 CAPSULE, EXTENDED RELEASE ORAL DAILY
Qty: 30 CAPSULE | Refills: 0 | Status: SHIPPED | OUTPATIENT
Start: 2024-03-22 | End: 2024-04-21

## 2024-01-22 RX ORDER — DEXTROAMPHETAMINE SACCHARATE, AMPHETAMINE ASPARTATE, DEXTROAMPHETAMINE SULFATE AND AMPHETAMINE SULFATE 3.75; 3.75; 3.75; 3.75 MG/1; MG/1; MG/1; MG/1
TABLET ORAL
Qty: 30 TABLET | Refills: 0 | Status: SHIPPED | OUTPATIENT
Start: 2024-03-22 | End: 2024-04-23

## 2024-01-22 RX ORDER — ALPRAZOLAM 0.25 MG/1
TABLET ORAL
Qty: 15 TABLET | Refills: 1 | Status: SHIPPED | OUTPATIENT
Start: 2024-01-22 | End: 2024-04-21

## 2024-01-22 RX ORDER — DEXTROAMPHETAMINE SACCHARATE, AMPHETAMINE ASPARTATE, DEXTROAMPHETAMINE SULFATE AND AMPHETAMINE SULFATE 3.75; 3.75; 3.75; 3.75 MG/1; MG/1; MG/1; MG/1
TABLET ORAL
Qty: 30 TABLET | Refills: 0 | Status: SHIPPED | OUTPATIENT
Start: 2024-02-21 | End: 2024-03-24

## 2024-01-22 RX ORDER — DEXTROAMPHETAMINE SACCHARATE, AMPHETAMINE ASPARTATE, DEXTROAMPHETAMINE SULFATE AND AMPHETAMINE SULFATE 3.75; 3.75; 3.75; 3.75 MG/1; MG/1; MG/1; MG/1
TABLET ORAL
Qty: 30 TABLET | Refills: 0 | Status: SHIPPED | OUTPATIENT
Start: 2024-01-22 | End: 2024-02-23

## 2024-01-22 RX ORDER — FAMOTIDINE 20 MG/1
20 TABLET, FILM COATED ORAL 2 TIMES DAILY
COMMUNITY

## 2024-01-22 RX ORDER — DEXTROAMPHETAMINE SACCHARATE, AMPHETAMINE ASPARTATE MONOHYDRATE, DEXTROAMPHETAMINE SULFATE AND AMPHETAMINE SULFATE 7.5; 7.5; 7.5; 7.5 MG/1; MG/1; MG/1; MG/1
30 CAPSULE, EXTENDED RELEASE ORAL DAILY
Qty: 30 CAPSULE | Refills: 0 | Status: SHIPPED | OUTPATIENT
Start: 2024-02-21 | End: 2024-03-22

## 2024-01-22 RX ORDER — DEXTROAMPHETAMINE SACCHARATE, AMPHETAMINE ASPARTATE MONOHYDRATE, DEXTROAMPHETAMINE SULFATE AND AMPHETAMINE SULFATE 7.5; 7.5; 7.5; 7.5 MG/1; MG/1; MG/1; MG/1
30 CAPSULE, EXTENDED RELEASE ORAL DAILY
Qty: 30 CAPSULE | Refills: 0 | Status: SHIPPED | OUTPATIENT
Start: 2024-01-22 | End: 2024-02-21

## 2024-01-22 NOTE — PROGRESS NOTES
Issa Cruz is a 28 y.o. male    Chief Complaint   Patient presents with    Follow-up     Med refills       There were no vitals taken for this visit.        1. Have you been to the ER, urgent care clinic since your last visit?  Hospitalized since your last visit? No    2. Have you seen or consulted any other health care providers outside of the [facility] since your last visit?  Include any pap smears or colon screening. No    
  vitamin D (CHOLECALCIFEROL) 25 MCG (1000 UT) TABS tablet Take 1 tablet by mouth daily Yes Automatic Reconciliation, Ar   cyanocobalamin 500 MCG tablet Take 1 tablet by mouth daily Yes Automatic Reconciliation, Ar   fexofenadine (ALLEGRA) 180 MG tablet Take 1 tablet by mouth daily Yes Automatic Reconciliation, Ar   fluticasone (FLONASE) 50 MCG/ACT nasal spray 2 sprays by Nasal route daily Yes Automatic Reconciliation, Ar   guaiFENesin (MUCINEX MAXIMUM STRENGTH) 1200 MG TB12 Take 1,200 mg by mouth as needed Yes Automatic Reconciliation, Ar   omeprazole (PRILOSEC OTC) 20 MG tablet Take 1 tablet by mouth as needed  Patient not taking: Reported on 10/17/2023  Automatic Reconciliation, Ar       Social History     Tobacco Use    Smoking status: Former    Smokeless tobacco: Never   Substance Use Topics    Alcohol use: Not Currently     Comment: Very occasional glass of wine/beer, once a year at most    Drug use: No        PHYSICAL EXAMINATION:   \"[x]\" Indicates a positive item  \"[]\" Indicates a negative item     Vital Signs: (As obtained by patient/caregiver or practitioner observation)        1/22/2024     2:48 PM   Patient-Reported Vitals   Patient-Reported Weight 121   Patient-Reported Height 5'6   Patient-Reported Pulse 99   Patient-Reported Temperature 98.9          Constitutional: [x] Appears well-developed and well-nourished [x] No apparent distress      [] Abnormal-   Mental status  [x] Alert and awake  [x] Oriented to person/place/time [x]Able to follow commands      Eyes:  EOM    [x]  Normal  [] Abnormal-  Sclera  [x]  Normal  [] Abnormal -         Discharge [x]  None visible  [] Abnormal -    HENT:   [x] Normocephalic, atraumatic.  [] Abnormal   [x] Mouth/Throat: Mucous membranes are moist.     External Ears [x] Normal  [] Abnormal-     Neck: [x] No visualized mass     Pulmonary/Chest: [x] Respiratory effort normal.  [x] No visualized signs of difficulty breathing or respiratory distress        [] Abnormal-

## 2024-04-22 ENCOUNTER — TELEMEDICINE (OUTPATIENT)
Age: 29
End: 2024-04-22

## 2024-04-22 DIAGNOSIS — F41.9 ANXIETY DISORDER, UNSPECIFIED TYPE: ICD-10-CM

## 2024-04-22 DIAGNOSIS — F90.9 ATTENTION DEFICIT HYPERACTIVITY DISORDER (ADHD), UNSPECIFIED ADHD TYPE: Primary | ICD-10-CM

## 2024-04-22 PROCEDURE — 99214 OFFICE O/P EST MOD 30 MIN: CPT | Performed by: INTERNAL MEDICINE

## 2024-04-22 RX ORDER — DEXTROAMPHETAMINE SACCHARATE, AMPHETAMINE ASPARTATE, DEXTROAMPHETAMINE SULFATE AND AMPHETAMINE SULFATE 3.75; 3.75; 3.75; 3.75 MG/1; MG/1; MG/1; MG/1
TABLET ORAL
Qty: 30 TABLET | Refills: 0 | Status: SHIPPED | OUTPATIENT
Start: 2024-06-21 | End: 2024-07-21

## 2024-04-22 RX ORDER — DEXTROAMPHETAMINE SACCHARATE, AMPHETAMINE ASPARTATE MONOHYDRATE, DEXTROAMPHETAMINE SULFATE AND AMPHETAMINE SULFATE 7.5; 7.5; 7.5; 7.5 MG/1; MG/1; MG/1; MG/1
30 CAPSULE, EXTENDED RELEASE ORAL DAILY
Qty: 30 CAPSULE | Refills: 0 | Status: SHIPPED | OUTPATIENT
Start: 2024-05-22 | End: 2024-06-21

## 2024-04-22 RX ORDER — ALPRAZOLAM 0.25 MG/1
TABLET ORAL
Qty: 15 TABLET | Refills: 1 | Status: SHIPPED | OUTPATIENT
Start: 2024-04-22 | End: 2024-07-21

## 2024-04-22 RX ORDER — DEXTROAMPHETAMINE SACCHARATE, AMPHETAMINE ASPARTATE, DEXTROAMPHETAMINE SULFATE AND AMPHETAMINE SULFATE 3.75; 3.75; 3.75; 3.75 MG/1; MG/1; MG/1; MG/1
TABLET ORAL
Qty: 30 TABLET | Refills: 0 | Status: SHIPPED | OUTPATIENT
Start: 2024-04-22 | End: 2024-05-24

## 2024-04-22 RX ORDER — DEXTROAMPHETAMINE SACCHARATE, AMPHETAMINE ASPARTATE MONOHYDRATE, DEXTROAMPHETAMINE SULFATE AND AMPHETAMINE SULFATE 7.5; 7.5; 7.5; 7.5 MG/1; MG/1; MG/1; MG/1
30 CAPSULE, EXTENDED RELEASE ORAL DAILY
Qty: 30 CAPSULE | Refills: 0 | Status: SHIPPED | OUTPATIENT
Start: 2024-06-21 | End: 2024-07-21

## 2024-04-22 RX ORDER — DEXTROAMPHETAMINE SACCHARATE, AMPHETAMINE ASPARTATE MONOHYDRATE, DEXTROAMPHETAMINE SULFATE AND AMPHETAMINE SULFATE 7.5; 7.5; 7.5; 7.5 MG/1; MG/1; MG/1; MG/1
30 CAPSULE, EXTENDED RELEASE ORAL DAILY
Qty: 30 CAPSULE | Refills: 0 | Status: SHIPPED | OUTPATIENT
Start: 2024-04-22 | End: 2024-05-22

## 2024-04-22 RX ORDER — DEXTROAMPHETAMINE SACCHARATE, AMPHETAMINE ASPARTATE, DEXTROAMPHETAMINE SULFATE AND AMPHETAMINE SULFATE 3.75; 3.75; 3.75; 3.75 MG/1; MG/1; MG/1; MG/1
TABLET ORAL
Qty: 30 TABLET | Refills: 0 | Status: SHIPPED | OUTPATIENT
Start: 2024-05-22 | End: 2024-05-24

## 2024-04-22 SDOH — ECONOMIC STABILITY: FOOD INSECURITY: WITHIN THE PAST 12 MONTHS, YOU WORRIED THAT YOUR FOOD WOULD RUN OUT BEFORE YOU GOT MONEY TO BUY MORE.: NEVER TRUE

## 2024-04-22 SDOH — ECONOMIC STABILITY: FOOD INSECURITY: WITHIN THE PAST 12 MONTHS, THE FOOD YOU BOUGHT JUST DIDN'T LAST AND YOU DIDN'T HAVE MONEY TO GET MORE.: NEVER TRUE

## 2024-04-22 SDOH — ECONOMIC STABILITY: INCOME INSECURITY: HOW HARD IS IT FOR YOU TO PAY FOR THE VERY BASICS LIKE FOOD, HOUSING, MEDICAL CARE, AND HEATING?: NOT HARD AT ALL

## 2024-04-22 ASSESSMENT — PATIENT HEALTH QUESTIONNAIRE - PHQ9
SUM OF ALL RESPONSES TO PHQ QUESTIONS 1-9: 0
SUM OF ALL RESPONSES TO PHQ9 QUESTIONS 1 & 2: 0
1. LITTLE INTEREST OR PLEASURE IN DOING THINGS: NOT AT ALL
SUM OF ALL RESPONSES TO PHQ QUESTIONS 1-9: 0
2. FEELING DOWN, DEPRESSED OR HOPELESS: NOT AT ALL

## 2024-04-22 NOTE — PROGRESS NOTES
Rm: VV    Chief Complaint   Patient presents with    Follow-up        1. Have you been to the ER, urgent care clinic since your last visit?  Hospitalized since your last visit?no    2. Have you seen or consulted any other health care providers outside of the [facility] since your last visit?  Include any pap smears or colon screening. no        Social Determinants of Health     Tobacco Use: Medium Risk (4/22/2024)    Patient History     Smoking Tobacco Use: Former     Smokeless Tobacco Use: Never     Passive Exposure: Not on file   Alcohol Use: Not on file   Financial Resource Strain: Low Risk  (4/22/2024)    Overall Financial Resource Strain (CARDIA)     Difficulty of Paying Living Expenses: Not hard at all   Food Insecurity: No Food Insecurity (4/22/2024)    Hunger Vital Sign     Worried About Running Out of Food in the Last Year: Never true     Ran Out of Food in the Last Year: Never true   Transportation Needs: Unknown (4/22/2024)    PRAPARE - Transportation     Lack of Transportation (Medical): Not on file     Lack of Transportation (Non-Medical): No   Physical Activity: Not on file   Stress: Not on file   Social Connections: Not on file   Intimate Partner Violence: Not on file   Depression: Not at risk (4/22/2024)    PHQ-2     PHQ-2 Score: 0   Housing Stability: Unknown (4/22/2024)    Housing Stability Vital Sign     Unable to Pay for Housing in the Last Year: Not on file     Number of Places Lived in the Last Year: Not on file     Unstable Housing in the Last Year: No   Interpersonal Safety: Not on file   Utilities: Not on file

## 2024-04-22 NOTE — PROGRESS NOTES
4/22/2024    TELEHEALTH EVALUATION -- Audio/Visual    ASSESSMENT/PLAN:   Diagnosis Orders   1. Attention deficit hyperactivity disorder (ADHD), unspecified ADHD type  amphetamine-dextroamphetamine (ADDERALL XR) 30 MG extended release capsule    amphetamine-dextroamphetamine (ADDERALL) 15 MG tablet    amphetamine-dextroamphetamine (ADDERALL XR) 30 MG extended release capsule    amphetamine-dextroamphetamine (ADDERALL XR) 30 MG extended release capsule    amphetamine-dextroamphetamine (ADDERALL) 15 MG tablet    amphetamine-dextroamphetamine (ADDERALL) 15 MG tablet      2. Anxiety disorder, unspecified type  ALPRAZolam (XANAX) 0.25 MG tablet          1:  Refill(s) and management reviewed.    2:  Refill for PRN use reviewed.    Requested Prescriptions     Signed Prescriptions Disp Refills    amphetamine-dextroamphetamine (ADDERALL XR) 30 MG extended release capsule 30 capsule 0     Sig: Take 1 capsule by mouth daily for 30 days. Max Daily Amount: 30 mg    amphetamine-dextroamphetamine (ADDERALL) 15 MG tablet 30 tablet 0     Sig: Take 1 Tablet by mouth daily as needed (attention). With evening classes/work    amphetamine-dextroamphetamine (ADDERALL XR) 30 MG extended release capsule 30 capsule 0     Sig: Take 1 capsule by mouth daily for 30 days. Max Daily Amount: 30 mg    amphetamine-dextroamphetamine (ADDERALL XR) 30 MG extended release capsule 30 capsule 0     Sig: Take 1 capsule by mouth daily for 30 days. Max Daily Amount: 30 mg    amphetamine-dextroamphetamine (ADDERALL) 15 MG tablet 30 tablet 0     Sig: Take 1 Tablet by mouth daily as needed (attention). With evening classes/work    amphetamine-dextroamphetamine (ADDERALL) 15 MG tablet 30 tablet 0     Sig: Take 1 Tablet by mouth daily as needed (attention). With evening classes/work    ALPRAZolam (XANAX) 0.25 MG tablet 15 tablet 1     Sig: TAKE 1-2 TABLETS BY MOUTH EVERY SIX (6) HOURS AS NEEDED FOR ANXIETY PRIOR TO/FOR FLYING       Return in about 3 months

## 2024-07-24 DIAGNOSIS — F90.9 ATTENTION DEFICIT HYPERACTIVITY DISORDER (ADHD), UNSPECIFIED ADHD TYPE: ICD-10-CM

## 2024-07-24 RX ORDER — DEXTROAMPHETAMINE SACCHARATE, AMPHETAMINE ASPARTATE MONOHYDRATE, DEXTROAMPHETAMINE SULFATE AND AMPHETAMINE SULFATE 7.5; 7.5; 7.5; 7.5 MG/1; MG/1; MG/1; MG/1
30 CAPSULE, EXTENDED RELEASE ORAL DAILY
Qty: 30 CAPSULE | Refills: 0 | Status: SHIPPED | OUTPATIENT
Start: 2024-07-24 | End: 2024-07-26 | Stop reason: SDUPTHER

## 2024-07-24 RX ORDER — DEXTROAMPHETAMINE SACCHARATE, AMPHETAMINE ASPARTATE, DEXTROAMPHETAMINE SULFATE AND AMPHETAMINE SULFATE 3.75; 3.75; 3.75; 3.75 MG/1; MG/1; MG/1; MG/1
TABLET ORAL
Qty: 30 TABLET | Refills: 0 | Status: SHIPPED | OUTPATIENT
Start: 2024-07-24 | End: 2024-07-26 | Stop reason: SDUPTHER

## 2024-07-24 NOTE — TELEPHONE ENCOUNTER
Refill request(s) approved--Adderall--as below.    Prescription Monitoring Program ([place]) database query with fills:      Requested Prescriptions     Signed Prescriptions Disp Refills    amphetamine-dextroamphetamine (ADDERALL XR) 30 MG extended release capsule 30 capsule 0     Sig: Take 1 capsule by mouth daily for 30 days. Max Daily Amount: 30 mg     Authorizing Provider: CRISTIAN JORDAN    amphetamine-dextroamphetamine (ADDERALL) 15 MG tablet 30 tablet 0     Sig: Take 1 Tablet by mouth daily as needed (attention). With evening classes/work     Authorizing Provider: CRITSIAN JORDAN       Future Appointments   Date Time Provider Department Center   7/26/2024 11:00 AM Cristian Jordan MD [facility]

## 2024-07-24 NOTE — TELEPHONE ENCOUNTER
Future Appointments:  Future Appointments   Date Time Provider Department Center   7/26/2024 11:00 AM Cristian Jordan MD [facility]        Last Appointment With Me:  4/22/2024     Requested Prescriptions     Pending Prescriptions Disp Refills    amphetamine-dextroamphetamine (ADDERALL XR) 30 MG extended release capsule 30 capsule 0     Sig: Take 1 capsule by mouth daily for 30 days. Max Daily Amount: 30 mg    amphetamine-dextroamphetamine (ADDERALL) 15 MG tablet 30 tablet 0     Sig: Take 1 Tablet by mouth daily as needed (attention). With evening classes/work

## 2024-07-26 ENCOUNTER — OFFICE VISIT (OUTPATIENT)
Age: 29
End: 2024-07-26

## 2024-07-26 VITALS
OXYGEN SATURATION: 98 % | WEIGHT: 115.8 LBS | HEART RATE: 96 BPM | TEMPERATURE: 97.5 F | RESPIRATION RATE: 16 BRPM | HEIGHT: 66 IN | BODY MASS INDEX: 18.61 KG/M2 | SYSTOLIC BLOOD PRESSURE: 118 MMHG | DIASTOLIC BLOOD PRESSURE: 76 MMHG

## 2024-07-26 DIAGNOSIS — Z83.3 FH: DIABETES MELLITUS: ICD-10-CM

## 2024-07-26 DIAGNOSIS — L65.9 HAIR LOSS: ICD-10-CM

## 2024-07-26 DIAGNOSIS — E53.8 LOW SERUM VITAMIN B12: ICD-10-CM

## 2024-07-26 DIAGNOSIS — Z83.49 FH: THYROID CONDITION: ICD-10-CM

## 2024-07-26 DIAGNOSIS — E55.9 HYPOVITAMINOSIS D: ICD-10-CM

## 2024-07-26 DIAGNOSIS — Z01.84 IMMUNITY STATUS TESTING: ICD-10-CM

## 2024-07-26 DIAGNOSIS — F90.9 ATTENTION DEFICIT HYPERACTIVITY DISORDER (ADHD), UNSPECIFIED ADHD TYPE: Primary | ICD-10-CM

## 2024-07-26 DIAGNOSIS — Z00.00 WELL ADULT HEALTH CHECK: ICD-10-CM

## 2024-07-26 PROCEDURE — 99214 OFFICE O/P EST MOD 30 MIN: CPT | Performed by: INTERNAL MEDICINE

## 2024-07-26 RX ORDER — DEXTROAMPHETAMINE SACCHARATE, AMPHETAMINE ASPARTATE, DEXTROAMPHETAMINE SULFATE AND AMPHETAMINE SULFATE 3.75; 3.75; 3.75; 3.75 MG/1; MG/1; MG/1; MG/1
TABLET ORAL
Qty: 30 TABLET | Refills: 0 | Status: SHIPPED | OUTPATIENT
Start: 2024-08-23 | End: 2024-09-22

## 2024-07-26 RX ORDER — DEXTROAMPHETAMINE SACCHARATE, AMPHETAMINE ASPARTATE MONOHYDRATE, DEXTROAMPHETAMINE SULFATE AND AMPHETAMINE SULFATE 7.5; 7.5; 7.5; 7.5 MG/1; MG/1; MG/1; MG/1
30 CAPSULE, EXTENDED RELEASE ORAL DAILY
Qty: 30 CAPSULE | Refills: 0 | Status: SHIPPED | OUTPATIENT
Start: 2024-09-22 | End: 2024-10-22

## 2024-07-26 RX ORDER — DEXTROAMPHETAMINE SACCHARATE, AMPHETAMINE ASPARTATE MONOHYDRATE, DEXTROAMPHETAMINE SULFATE AND AMPHETAMINE SULFATE 7.5; 7.5; 7.5; 7.5 MG/1; MG/1; MG/1; MG/1
30 CAPSULE, EXTENDED RELEASE ORAL DAILY
Qty: 30 CAPSULE | Refills: 0 | Status: SHIPPED | OUTPATIENT
Start: 2024-10-22 | End: 2024-11-21

## 2024-07-26 RX ORDER — DEXTROAMPHETAMINE SACCHARATE, AMPHETAMINE ASPARTATE, DEXTROAMPHETAMINE SULFATE AND AMPHETAMINE SULFATE 3.75; 3.75; 3.75; 3.75 MG/1; MG/1; MG/1; MG/1
TABLET ORAL
Qty: 30 TABLET | Refills: 0 | Status: SHIPPED | OUTPATIENT
Start: 2024-10-22 | End: 2024-11-21

## 2024-07-26 RX ORDER — DEXTROAMPHETAMINE SACCHARATE, AMPHETAMINE ASPARTATE MONOHYDRATE, DEXTROAMPHETAMINE SULFATE AND AMPHETAMINE SULFATE 7.5; 7.5; 7.5; 7.5 MG/1; MG/1; MG/1; MG/1
30 CAPSULE, EXTENDED RELEASE ORAL DAILY
Qty: 30 CAPSULE | Refills: 0 | Status: SHIPPED | OUTPATIENT
Start: 2024-08-23 | End: 2024-09-22

## 2024-07-26 RX ORDER — DEXTROAMPHETAMINE SACCHARATE, AMPHETAMINE ASPARTATE, DEXTROAMPHETAMINE SULFATE AND AMPHETAMINE SULFATE 3.75; 3.75; 3.75; 3.75 MG/1; MG/1; MG/1; MG/1
TABLET ORAL
Qty: 30 TABLET | Refills: 0 | Status: SHIPPED | OUTPATIENT
Start: 2024-09-22 | End: 2024-10-22

## 2024-07-26 SDOH — ECONOMIC STABILITY: FOOD INSECURITY: WITHIN THE PAST 12 MONTHS, THE FOOD YOU BOUGHT JUST DIDN'T LAST AND YOU DIDN'T HAVE MONEY TO GET MORE.: NEVER TRUE

## 2024-07-26 SDOH — ECONOMIC STABILITY: INCOME INSECURITY: HOW HARD IS IT FOR YOU TO PAY FOR THE VERY BASICS LIKE FOOD, HOUSING, MEDICAL CARE, AND HEATING?: NOT HARD AT ALL

## 2024-07-26 SDOH — ECONOMIC STABILITY: FOOD INSECURITY: WITHIN THE PAST 12 MONTHS, YOU WORRIED THAT YOUR FOOD WOULD RUN OUT BEFORE YOU GOT MONEY TO BUY MORE.: NEVER TRUE

## 2024-07-26 ASSESSMENT — PATIENT HEALTH QUESTIONNAIRE - PHQ9
SUM OF ALL RESPONSES TO PHQ QUESTIONS 1-9: 0
1. LITTLE INTEREST OR PLEASURE IN DOING THINGS: NOT AT ALL
SUM OF ALL RESPONSES TO PHQ9 QUESTIONS 1 & 2: 0
2. FEELING DOWN, DEPRESSED OR HOPELESS: NOT AT ALL
SUM OF ALL RESPONSES TO PHQ QUESTIONS 1-9: 0

## 2024-07-26 NOTE — PROGRESS NOTES
Issa Cruz (: 1995) is a 29 y.o. male, established patient, here for FASTING: No evaluation of the following chief complaint(s):  Chief Complaint   Patient presents with    Medication Refill       Assessment and Plan:      Diagnosis Orders   1. Attention deficit hyperactivity disorder (ADHD), unspecified ADHD type  amphetamine-dextroamphetamine (ADDERALL XR) 30 MG extended release capsule    amphetamine-dextroamphetamine (ADDERALL XR) 30 MG extended release capsule    amphetamine-dextroamphetamine (ADDERALL XR) 30 MG extended release capsule    amphetamine-dextroamphetamine (ADDERALL) 15 MG tablet    amphetamine-dextroamphetamine (ADDERALL) 15 MG tablet    amphetamine-dextroamphetamine (ADDERALL) 15 MG tablet      2. Well adult health check  CBC with Auto Differential    Comprehensive Metabolic Panel    Lipid Panel    Hemoglobin A1C    TSH    T4, Free      3. Hair loss  TSH    T4, Free    ANN -  Ky Willingham MD, Dermatology, [place] ([facility])      4. Hypovitaminosis D  Vitamin D 25 Hydroxy      5. Low serum vitamin B12  Vitamin B12      6. Immunity status testing  Varicella Zoster Antibody, IgG    Hepatitis B Surface Antibody    Hepatitis B Surface Antigen    Hepatitis B Core Antibody, Total      7. FH: diabetes mellitus        8. FH: thyroid condition            1:  Refill(s) and management reviewed.    2,7-8  Screenings reviewed at visit.    3:  Lab evaluation reviewed.    4-5:  Lab monitoring reviewed.    6:  Lab evaluation reviewed.    Note:  Labs will be done at future date--he feels bad after havign labs done, and needs to do something after visit today, so will do later.      Requested Prescriptions     Signed Prescriptions Disp Refills    amphetamine-dextroamphetamine (ADDERALL XR) 30 MG extended release capsule 30 capsule 0     Sig: Take 1 capsule by mouth daily for 30 days. Max Daily Amount: 30 mg    amphetamine-dextroamphetamine (ADDERALL XR) 30 MG extended release capsule 30 capsule 0

## 2024-07-26 NOTE — PROGRESS NOTES
RM:16  Chief Complaint   Patient presents with    Medication Refill      Vitals:    07/26/24 1103   BP: 118/76   Pulse: 96   Resp: 16   Temp: 97.5 °F (36.4 °C)   SpO2: 98%      FASTING: No  Have you been to the ER, urgent care clinic since your last visit?  Hospitalized since your last visit?\"    NO  “Have you seen or consulted any other health care providers outside of [facility] since your last visit?”    NO    Click Here for Release of Records Request

## 2024-07-26 NOTE — PATIENT INSTRUCTIONS
If you need help finding a dermatologist covered by your insurance    --You can call [facility], [facility], [facility] or [facility], for dermatology evaluation      --You can also contact your insurance to see which providers are covered prior to calling for an appointment.

## 2024-07-31 ENCOUNTER — TELEPHONE (OUTPATIENT)
Age: 29
End: 2024-07-31

## 2024-07-31 NOTE — TELEPHONE ENCOUNTER
----- Message from Issa Cruz sent at 7/30/2024  9:48 PM EDT -----  Regarding: Alprazolam prescription renewal  Contact: [phone]  Ghulam Jordan,   I told the CMA during my visit but forgot to tell you I was going out of town in a few weeks (8/17) and need to refill the alprazolam prescription. I don’t see it under the prescriptions on my prescription renewal page, and it doesn’t let me do it on the [facility] site either, so I’m sending you a message.

## 2024-08-15 DIAGNOSIS — F41.9 ANXIETY DISORDER, UNSPECIFIED TYPE: ICD-10-CM

## 2024-08-15 RX ORDER — ALPRAZOLAM 0.25 MG/1
TABLET ORAL
Qty: 15 TABLET | Refills: 0 | Status: SHIPPED | OUTPATIENT
Start: 2024-08-15 | End: 2024-11-13

## 2024-08-15 NOTE — TELEPHONE ENCOUNTER
Refill request(s) approved--alprazolam--for travel-related anxiety--requested in [employer] message.    Last refill was for #15 with 1 refill from 4-22-24.    Prescription Monitoring Program ([place]) database query with fills:      Requested Prescriptions     Signed Prescriptions Disp Refills    ALPRAZolam (XANAX) 0.25 MG tablet 15 tablet 0     Sig: TAKE 1-2 TABLETS BY MOUTH EVERY SIX (6) HOURS AS NEEDED FOR ANXIETY PRIOR TO/FOR FLYING     Authorizing Provider: ABDULLAHI ELIZALDE

## 2024-09-12 ENCOUNTER — TELEPHONE (OUTPATIENT)
Age: 29
End: 2024-09-12

## 2024-10-25 ENCOUNTER — TELEMEDICINE (OUTPATIENT)
Age: 29
End: 2024-10-25

## 2024-10-25 DIAGNOSIS — F90.9 ATTENTION DEFICIT HYPERACTIVITY DISORDER (ADHD), UNSPECIFIED ADHD TYPE: ICD-10-CM

## 2024-10-25 PROCEDURE — 99213 OFFICE O/P EST LOW 20 MIN: CPT | Performed by: INTERNAL MEDICINE

## 2024-10-25 RX ORDER — DEXTROAMPHETAMINE SACCHARATE, AMPHETAMINE ASPARTATE, DEXTROAMPHETAMINE SULFATE AND AMPHETAMINE SULFATE 3.75; 3.75; 3.75; 3.75 MG/1; MG/1; MG/1; MG/1
TABLET ORAL
Qty: 30 TABLET | Refills: 0 | Status: SHIPPED | OUTPATIENT
Start: 2024-10-25 | End: 2024-11-24

## 2024-10-25 RX ORDER — DEXTROAMPHETAMINE SACCHARATE, AMPHETAMINE ASPARTATE MONOHYDRATE, DEXTROAMPHETAMINE SULFATE AND AMPHETAMINE SULFATE 7.5; 7.5; 7.5; 7.5 MG/1; MG/1; MG/1; MG/1
30 CAPSULE, EXTENDED RELEASE ORAL DAILY
Qty: 30 CAPSULE | Refills: 0 | Status: SHIPPED | OUTPATIENT
Start: 2024-10-25 | End: 2024-11-24

## 2024-10-25 RX ORDER — DEXTROAMPHETAMINE SACCHARATE, AMPHETAMINE ASPARTATE, DEXTROAMPHETAMINE SULFATE AND AMPHETAMINE SULFATE 3.75; 3.75; 3.75; 3.75 MG/1; MG/1; MG/1; MG/1
TABLET ORAL
Qty: 30 TABLET | Refills: 0 | Status: SHIPPED | OUTPATIENT
Start: 2024-12-24 | End: 2025-01-23

## 2024-10-25 RX ORDER — DEXTROAMPHETAMINE SACCHARATE, AMPHETAMINE ASPARTATE MONOHYDRATE, DEXTROAMPHETAMINE SULFATE AND AMPHETAMINE SULFATE 7.5; 7.5; 7.5; 7.5 MG/1; MG/1; MG/1; MG/1
30 CAPSULE, EXTENDED RELEASE ORAL DAILY
Qty: 30 CAPSULE | Refills: 0 | Status: SHIPPED | OUTPATIENT
Start: 2024-12-24 | End: 2025-01-23

## 2024-10-25 RX ORDER — DEXTROAMPHETAMINE SACCHARATE, AMPHETAMINE ASPARTATE, DEXTROAMPHETAMINE SULFATE AND AMPHETAMINE SULFATE 3.75; 3.75; 3.75; 3.75 MG/1; MG/1; MG/1; MG/1
TABLET ORAL
Qty: 30 TABLET | Refills: 0 | Status: SHIPPED | OUTPATIENT
Start: 2024-11-24 | End: 2024-11-24

## 2024-10-25 RX ORDER — DEXTROAMPHETAMINE SACCHARATE, AMPHETAMINE ASPARTATE MONOHYDRATE, DEXTROAMPHETAMINE SULFATE AND AMPHETAMINE SULFATE 7.5; 7.5; 7.5; 7.5 MG/1; MG/1; MG/1; MG/1
30 CAPSULE, EXTENDED RELEASE ORAL DAILY
Qty: 30 CAPSULE | Refills: 0 | Status: SHIPPED | OUTPATIENT
Start: 2024-11-24 | End: 2024-12-24

## 2024-10-25 NOTE — PROGRESS NOTES
10/25/2024    TELEHEALTH EVALUATION -- Audio/Visual    ASSESSMENT/PLAN:   Diagnosis Orders   1. Attention deficit hyperactivity disorder (ADHD), unspecified ADHD type  amphetamine-dextroamphetamine (ADDERALL XR) 30 MG extended release capsule    amphetamine-dextroamphetamine (ADDERALL XR) 30 MG extended release capsule    amphetamine-dextroamphetamine (ADDERALL XR) 30 MG extended release capsule    amphetamine-dextroamphetamine (ADDERALL) 15 MG tablet    amphetamine-dextroamphetamine (ADDERALL) 15 MG tablet    amphetamine-dextroamphetamine (ADDERALL) 15 MG tablet          1:  Refill(s) and management reviewed.      Requested Prescriptions     Signed Prescriptions Disp Refills    amphetamine-dextroamphetamine (ADDERALL XR) 30 MG extended release capsule 30 capsule 0     Sig: Take 1 capsule by mouth daily for 30 days. Max Daily Amount: 30 mg    amphetamine-dextroamphetamine (ADDERALL XR) 30 MG extended release capsule 30 capsule 0     Sig: Take 1 capsule by mouth daily for 30 days. Max Daily Amount: 30 mg    amphetamine-dextroamphetamine (ADDERALL XR) 30 MG extended release capsule 30 capsule 0     Sig: Take 1 capsule by mouth daily for 30 days. Max Daily Amount: 30 mg    amphetamine-dextroamphetamine (ADDERALL) 15 MG tablet 30 tablet 0     Sig: Take 1 Tablet by mouth daily as needed (attention). With evening classes/work    amphetamine-dextroamphetamine (ADDERALL) 15 MG tablet 30 tablet 0     Sig: Take 1 Tablet by mouth daily as needed (attention). With evening classes/work    amphetamine-dextroamphetamine (ADDERALL) 15 MG tablet 30 tablet 0     Sig: Take 1 Tablet by mouth daily as needed (attention). With evening classes/work       Return in about 3 months (around 1/25/2025) for medication follow-up--(virtual visit OK).  reviewed diet, exercise and weight control  reviewed medications and side effects in detail    Plan and evaluation (above) reviewed with pt at visit  Patient voiced understanding of plan and

## 2024-10-25 NOTE — PROGRESS NOTES
Virtual visit  Chief Complaint   Patient presents with    ADHD       There were no vitals filed for this visit.          No data to display                \"Have you been to the ER, urgent care clinic since your last visit?  Hospitalized since your last visit?\"    NO    “Have you seen or consulted any other health care providers outside of [facility] since your last visit?”    NO            Click Here for Release of Records Request   AVS  education, follow up, and recommendations provided and addressed with patient.  services used to advise patient no

## 2025-01-17 ENCOUNTER — TELEMEDICINE (OUTPATIENT)
Age: 30
End: 2025-01-17

## 2025-01-17 DIAGNOSIS — F90.9 ATTENTION DEFICIT HYPERACTIVITY DISORDER (ADHD), UNSPECIFIED ADHD TYPE: ICD-10-CM

## 2025-01-17 PROCEDURE — 99213 OFFICE O/P EST LOW 20 MIN: CPT | Performed by: INTERNAL MEDICINE

## 2025-01-17 RX ORDER — DEXTROAMPHETAMINE SACCHARATE, AMPHETAMINE ASPARTATE, DEXTROAMPHETAMINE SULFATE AND AMPHETAMINE SULFATE 3.75; 3.75; 3.75; 3.75 MG/1; MG/1; MG/1; MG/1
TABLET ORAL
Qty: 30 TABLET | Refills: 0 | Status: SHIPPED | OUTPATIENT
Start: 2025-01-23 | End: 2025-02-16

## 2025-01-17 RX ORDER — DEXTROAMPHETAMINE SACCHARATE, AMPHETAMINE ASPARTATE MONOHYDRATE, DEXTROAMPHETAMINE SULFATE AND AMPHETAMINE SULFATE 7.5; 7.5; 7.5; 7.5 MG/1; MG/1; MG/1; MG/1
30 CAPSULE, EXTENDED RELEASE ORAL DAILY
Qty: 30 CAPSULE | Refills: 0 | Status: SHIPPED | OUTPATIENT
Start: 2025-03-24 | End: 2025-04-23

## 2025-01-17 RX ORDER — DEXTROAMPHETAMINE SACCHARATE, AMPHETAMINE ASPARTATE MONOHYDRATE, DEXTROAMPHETAMINE SULFATE AND AMPHETAMINE SULFATE 7.5; 7.5; 7.5; 7.5 MG/1; MG/1; MG/1; MG/1
30 CAPSULE, EXTENDED RELEASE ORAL DAILY
Qty: 30 CAPSULE | Refills: 0 | Status: SHIPPED | OUTPATIENT
Start: 2025-02-22 | End: 2025-03-24

## 2025-01-17 RX ORDER — DEXTROAMPHETAMINE SACCHARATE, AMPHETAMINE ASPARTATE, DEXTROAMPHETAMINE SULFATE AND AMPHETAMINE SULFATE 3.75; 3.75; 3.75; 3.75 MG/1; MG/1; MG/1; MG/1
TABLET ORAL
Qty: 30 TABLET | Refills: 0 | Status: SHIPPED | OUTPATIENT
Start: 2025-03-24 | End: 2025-04-23

## 2025-01-17 RX ORDER — DEXTROAMPHETAMINE SACCHARATE, AMPHETAMINE ASPARTATE, DEXTROAMPHETAMINE SULFATE AND AMPHETAMINE SULFATE 3.75; 3.75; 3.75; 3.75 MG/1; MG/1; MG/1; MG/1
TABLET ORAL
Qty: 30 TABLET | Refills: 0 | Status: SHIPPED | OUTPATIENT
Start: 2025-02-22 | End: 2025-03-24

## 2025-01-17 RX ORDER — DEXTROAMPHETAMINE SACCHARATE, AMPHETAMINE ASPARTATE MONOHYDRATE, DEXTROAMPHETAMINE SULFATE AND AMPHETAMINE SULFATE 7.5; 7.5; 7.5; 7.5 MG/1; MG/1; MG/1; MG/1
30 CAPSULE, EXTENDED RELEASE ORAL DAILY
Qty: 30 CAPSULE | Refills: 0 | Status: SHIPPED | OUTPATIENT
Start: 2025-01-23 | End: 2025-02-22

## 2025-01-17 SDOH — ECONOMIC STABILITY: FOOD INSECURITY: WITHIN THE PAST 12 MONTHS, YOU WORRIED THAT YOUR FOOD WOULD RUN OUT BEFORE YOU GOT MONEY TO BUY MORE.: PATIENT DECLINED

## 2025-01-17 SDOH — ECONOMIC STABILITY: TRANSPORTATION INSECURITY
IN THE PAST 12 MONTHS, HAS THE LACK OF TRANSPORTATION KEPT YOU FROM MEDICAL APPOINTMENTS OR FROM GETTING MEDICATIONS?: PATIENT DECLINED

## 2025-01-17 SDOH — ECONOMIC STABILITY: FOOD INSECURITY: WITHIN THE PAST 12 MONTHS, THE FOOD YOU BOUGHT JUST DIDN'T LAST AND YOU DIDN'T HAVE MONEY TO GET MORE.: PATIENT DECLINED

## 2025-01-17 SDOH — ECONOMIC STABILITY: INCOME INSECURITY: IN THE LAST 12 MONTHS, WAS THERE A TIME WHEN YOU WERE NOT ABLE TO PAY THE MORTGAGE OR RENT ON TIME?: PATIENT DECLINED

## 2025-01-17 SDOH — ECONOMIC STABILITY: TRANSPORTATION INSECURITY
IN THE PAST 12 MONTHS, HAS LACK OF TRANSPORTATION KEPT YOU FROM MEETINGS, WORK, OR FROM GETTING THINGS NEEDED FOR DAILY LIVING?: PATIENT DECLINED

## 2025-01-17 ASSESSMENT — PATIENT HEALTH QUESTIONNAIRE - PHQ9
SUM OF ALL RESPONSES TO PHQ QUESTIONS 1-9: 0
SUM OF ALL RESPONSES TO PHQ QUESTIONS 1-9: 0
1. LITTLE INTEREST OR PLEASURE IN DOING THINGS: NOT AT ALL
SUM OF ALL RESPONSES TO PHQ9 QUESTIONS 1 & 2: 0
SUM OF ALL RESPONSES TO PHQ QUESTIONS 1-9: 0
2. FEELING DOWN, DEPRESSED OR HOPELESS: NOT AT ALL
SUM OF ALL RESPONSES TO PHQ QUESTIONS 1-9: 0

## 2025-01-17 NOTE — PROGRESS NOTES
1/17/2025    TELEHEALTH EVALUATION -- Audio/Visual    ASSESSMENT/PLAN:   Diagnosis Orders   1. Attention deficit hyperactivity disorder (ADHD), unspecified ADHD type  amphetamine-dextroamphetamine (ADDERALL XR) 30 MG extended release capsule    amphetamine-dextroamphetamine (ADDERALL XR) 30 MG extended release capsule    amphetamine-dextroamphetamine (ADDERALL) 15 MG tablet    amphetamine-dextroamphetamine (ADDERALL) 15 MG tablet    amphetamine-dextroamphetamine (ADDERALL) 15 MG tablet    amphetamine-dextroamphetamine (ADDERALL XR) 30 MG extended release capsule          1:  Continue current medications.  Refill(s) and management reviewed.      Requested Prescriptions     Signed Prescriptions Disp Refills    amphetamine-dextroamphetamine (ADDERALL XR) 30 MG extended release capsule 30 capsule 0     Sig: Take 1 capsule by mouth daily for 30 days. Max Daily Amount: 30 mg    amphetamine-dextroamphetamine (ADDERALL XR) 30 MG extended release capsule 30 capsule 0     Sig: Take 1 capsule by mouth daily for 30 days. Max Daily Amount: 30 mg    amphetamine-dextroamphetamine (ADDERALL) 15 MG tablet 30 tablet 0     Sig: Take 1 Tablet by mouth daily as needed (attention). With evening classes/work    amphetamine-dextroamphetamine (ADDERALL) 15 MG tablet 30 tablet 0     Sig: Take 1 Tablet by mouth daily as needed (attention). With evening classes/work    amphetamine-dextroamphetamine (ADDERALL) 15 MG tablet 30 tablet 0     Sig: Take 1 Tablet by mouth daily as needed (attention). With evening classes/work    amphetamine-dextroamphetamine (ADDERALL XR) 30 MG extended release capsule 30 capsule 0     Sig: Take 1 capsule by mouth daily for 30 days. Max Daily Amount: 30 mg       Return in about 3 months (around 4/17/2025) for medication follow-up.  --LOV July 2024, so VV OK for follow-up reviewed.    reviewed medications and side effects in detail    Plan and evaluation (above) reviewed with pt at visit  Patient voiced

## 2025-01-17 NOTE — PROGRESS NOTES
RM: VV  Chief Complaint   Patient presents with    follow up for meds      There were no vitals filed for this visit.   FASTING: No  Have you been to the ER, urgent care clinic since your last visit?  Hospitalized since your last visit?\"    NO  “Have you seen or consulted any other health care providers outside of [facility] since your last visit?”    NO    Click Here for Release of Records Request           1/17/2025     3:30 PM   Patient-Reported Vitals   Patient-Reported Weight 115   Patient-Reported Height 5'6   Patient-Reported Temperature 98.6

## 2025-04-17 DIAGNOSIS — F90.9 ATTENTION DEFICIT HYPERACTIVITY DISORDER (ADHD), UNSPECIFIED ADHD TYPE: ICD-10-CM

## 2025-04-17 RX ORDER — DEXTROAMPHETAMINE SACCHARATE, AMPHETAMINE ASPARTATE MONOHYDRATE, DEXTROAMPHETAMINE SULFATE AND AMPHETAMINE SULFATE 7.5; 7.5; 7.5; 7.5 MG/1; MG/1; MG/1; MG/1
30 CAPSULE, EXTENDED RELEASE ORAL DAILY
Qty: 30 CAPSULE | Refills: 0 | Status: CANCELLED | OUTPATIENT
Start: 2025-04-17 | End: 2025-05-17

## 2025-04-17 RX ORDER — DEXTROAMPHETAMINE SACCHARATE, AMPHETAMINE ASPARTATE, DEXTROAMPHETAMINE SULFATE AND AMPHETAMINE SULFATE 3.75; 3.75; 3.75; 3.75 MG/1; MG/1; MG/1; MG/1
TABLET ORAL
Qty: 30 TABLET | Refills: 0 | Status: CANCELLED | OUTPATIENT
Start: 2025-04-17 | End: 2025-05-11

## 2025-04-17 RX ORDER — DEXTROAMPHETAMINE SACCHARATE, AMPHETAMINE ASPARTATE, DEXTROAMPHETAMINE SULFATE AND AMPHETAMINE SULFATE 3.75; 3.75; 3.75; 3.75 MG/1; MG/1; MG/1; MG/1
TABLET ORAL
Qty: 30 TABLET | Refills: 0 | Status: CANCELLED | OUTPATIENT
Start: 2025-04-17 | End: 2025-05-17

## 2025-04-17 NOTE — TELEPHONE ENCOUNTER
Future Appointments:  Future Appointments   Date Time Provider Department Center   4/24/2025  4:40 PM Cristian Jordan MD [facility]        Last Appointment With Me:  1/17/2025     Requested Prescriptions     Pending Prescriptions Disp Refills    amphetamine-dextroamphetamine (ADDERALL XR) 30 MG extended release capsule 30 capsule 0     Sig: Take 1 capsule by mouth daily for 30 days. Max Daily Amount: 30 mg    amphetamine-dextroamphetamine (ADDERALL XR) 30 MG extended release capsule 30 capsule 0     Sig: Take 1 capsule by mouth daily for 30 days. Max Daily Amount: 30 mg    amphetamine-dextroamphetamine (ADDERALL XR) 30 MG extended release capsule 30 capsule 0     Sig: Take 1 capsule by mouth daily for 30 days. Max Daily Amount: 30 mg    amphetamine-dextroamphetamine (ADDERALL) 15 MG tablet 30 tablet 0     Sig: Take 1 Tablet by mouth daily as needed (attention). With evening classes/work    amphetamine-dextroamphetamine (ADDERALL) 15 MG tablet 30 tablet 0     Sig: Take 1 Tablet by mouth daily as needed (attention). With evening classes/work    amphetamine-dextroamphetamine (ADDERALL) 15 MG tablet 30 tablet 0     Sig: Take 1 Tablet by mouth daily as needed (attention). With evening classes/work

## 2025-04-17 NOTE — TELEPHONE ENCOUNTER
PT CALLED REQUESTING A REFILL FOR ADDERALL XR 30 AND ADDERALL 15 MG, PT WILL RUN OUT BEFORE HIS APPT.

## 2025-04-24 ENCOUNTER — TELEMEDICINE (OUTPATIENT)
Age: 30
End: 2025-04-24

## 2025-04-24 DIAGNOSIS — F90.9 ATTENTION DEFICIT HYPERACTIVITY DISORDER (ADHD), UNSPECIFIED ADHD TYPE: ICD-10-CM

## 2025-04-24 PROCEDURE — 99213 OFFICE O/P EST LOW 20 MIN: CPT | Performed by: INTERNAL MEDICINE

## 2025-04-24 RX ORDER — DEXTROAMPHETAMINE SACCHARATE, AMPHETAMINE ASPARTATE, DEXTROAMPHETAMINE SULFATE AND AMPHETAMINE SULFATE 3.75; 3.75; 3.75; 3.75 MG/1; MG/1; MG/1; MG/1
TABLET ORAL
Qty: 30 TABLET | Refills: 0 | Status: SHIPPED | OUTPATIENT
Start: 2025-05-24 | End: 2025-05-24

## 2025-04-24 RX ORDER — DEXTROAMPHETAMINE SACCHARATE, AMPHETAMINE ASPARTATE, DEXTROAMPHETAMINE SULFATE AND AMPHETAMINE SULFATE 3.75; 3.75; 3.75; 3.75 MG/1; MG/1; MG/1; MG/1
TABLET ORAL
Qty: 30 TABLET | Refills: 0 | Status: SHIPPED | OUTPATIENT
Start: 2025-06-23 | End: 2025-07-23

## 2025-04-24 RX ORDER — DEXTROAMPHETAMINE SACCHARATE, AMPHETAMINE ASPARTATE MONOHYDRATE, DEXTROAMPHETAMINE SULFATE AND AMPHETAMINE SULFATE 7.5; 7.5; 7.5; 7.5 MG/1; MG/1; MG/1; MG/1
30 CAPSULE, EXTENDED RELEASE ORAL DAILY
Qty: 30 CAPSULE | Refills: 0 | Status: SHIPPED | OUTPATIENT
Start: 2025-04-24 | End: 2025-05-24

## 2025-04-24 RX ORDER — DEXTROAMPHETAMINE SACCHARATE, AMPHETAMINE ASPARTATE MONOHYDRATE, DEXTROAMPHETAMINE SULFATE AND AMPHETAMINE SULFATE 7.5; 7.5; 7.5; 7.5 MG/1; MG/1; MG/1; MG/1
30 CAPSULE, EXTENDED RELEASE ORAL DAILY
Qty: 30 CAPSULE | Refills: 0 | Status: SHIPPED | OUTPATIENT
Start: 2025-05-24 | End: 2025-06-23

## 2025-04-24 RX ORDER — DEXTROAMPHETAMINE SACCHARATE, AMPHETAMINE ASPARTATE, DEXTROAMPHETAMINE SULFATE AND AMPHETAMINE SULFATE 3.75; 3.75; 3.75; 3.75 MG/1; MG/1; MG/1; MG/1
TABLET ORAL
Qty: 30 TABLET | Refills: 0 | Status: SHIPPED | OUTPATIENT
Start: 2025-04-24 | End: 2025-05-24

## 2025-04-24 RX ORDER — DEXTROAMPHETAMINE SACCHARATE, AMPHETAMINE ASPARTATE MONOHYDRATE, DEXTROAMPHETAMINE SULFATE AND AMPHETAMINE SULFATE 7.5; 7.5; 7.5; 7.5 MG/1; MG/1; MG/1; MG/1
30 CAPSULE, EXTENDED RELEASE ORAL DAILY
Qty: 30 CAPSULE | Refills: 0 | Status: SHIPPED | OUTPATIENT
Start: 2025-06-23 | End: 2025-07-23

## 2025-04-24 SDOH — ECONOMIC STABILITY: FOOD INSECURITY: WITHIN THE PAST 12 MONTHS, THE FOOD YOU BOUGHT JUST DIDN'T LAST AND YOU DIDN'T HAVE MONEY TO GET MORE.: NEVER TRUE

## 2025-04-24 ASSESSMENT — PATIENT HEALTH QUESTIONNAIRE - PHQ9
SUM OF ALL RESPONSES TO PHQ QUESTIONS 1-9: 0
SUM OF ALL RESPONSES TO PHQ QUESTIONS 1-9: 0
2. FEELING DOWN, DEPRESSED OR HOPELESS: NOT AT ALL
1. LITTLE INTEREST OR PLEASURE IN DOING THINGS: NOT AT ALL
SUM OF ALL RESPONSES TO PHQ QUESTIONS 1-9: 0
SUM OF ALL RESPONSES TO PHQ QUESTIONS 1-9: 0

## 2025-04-24 NOTE — PROGRESS NOTES
RM: VV  Chief Complaint   Patient presents with    Follow-up      There were no vitals filed for this visit.   FASTING: No  Have you been to the ER, urgent care clinic since your last visit?  Hospitalized since your last visit?\"    NO  “Have you seen or consulted any other health care providers outside of [facility] since your last visit?”    NO    Click Here for Release of Records Request

## 2025-04-24 NOTE — PROGRESS NOTES
4/24/2025    TELEHEALTH EVALUATION -- Audio/Visual    ASSESSMENT/PLAN:   Diagnosis Orders   1. Attention deficit hyperactivity disorder (ADHD), unspecified ADHD type  amphetamine-dextroamphetamine (ADDERALL XR) 30 MG extended release capsule    amphetamine-dextroamphetamine (ADDERALL XR) 30 MG extended release capsule    amphetamine-dextroamphetamine (ADDERALL XR) 30 MG extended release capsule    amphetamine-dextroamphetamine (ADDERALL) 15 MG tablet    amphetamine-dextroamphetamine (ADDERALL) 15 MG tablet    amphetamine-dextroamphetamine (ADDERALL) 15 MG tablet          1:  Continue current medications pending lab results/review.  Refill(s) and management reviewed.      Requested Prescriptions     Signed Prescriptions Disp Refills    amphetamine-dextroamphetamine (ADDERALL XR) 30 MG extended release capsule 30 capsule 0     Sig: Take 1 capsule by mouth daily for 30 days. Max Daily Amount: 30 mg    amphetamine-dextroamphetamine (ADDERALL XR) 30 MG extended release capsule 30 capsule 0     Sig: Take 1 capsule by mouth daily for 30 days. Max Daily Amount: 30 mg    amphetamine-dextroamphetamine (ADDERALL XR) 30 MG extended release capsule 30 capsule 0     Sig: Take 1 capsule by mouth daily for 30 days. Max Daily Amount: 30 mg    amphetamine-dextroamphetamine (ADDERALL) 15 MG tablet 30 tablet 0     Sig: Take 1 Tablet by mouth daily as needed (attention). With evening classes/work    amphetamine-dextroamphetamine (ADDERALL) 15 MG tablet 30 tablet 0     Sig: Take 1 Tablet by mouth daily as needed (attention). With evening classes/work    amphetamine-dextroamphetamine (ADDERALL) 15 MG tablet 30 tablet 0     Sig: Take 1 Tablet by mouth daily as needed (attention). With evening classes/work       Return in about 3 months (around 7/24/2025) for medication follow-up--office visit or virtual (prefer office--last in office July 2024).  reviewed medications and side effects in detail    Plan and evaluation (above) reviewed with

## 2025-07-22 ENCOUNTER — TELEMEDICINE (OUTPATIENT)
Age: 30
End: 2025-07-22

## 2025-07-22 DIAGNOSIS — F90.9 ATTENTION DEFICIT HYPERACTIVITY DISORDER (ADHD), UNSPECIFIED ADHD TYPE: Primary | ICD-10-CM

## 2025-07-22 DIAGNOSIS — F32.A ANXIETY AND DEPRESSION: ICD-10-CM

## 2025-07-22 DIAGNOSIS — F41.9 ANXIETY AND DEPRESSION: ICD-10-CM

## 2025-07-22 PROCEDURE — 99214 OFFICE O/P EST MOD 30 MIN: CPT | Performed by: INTERNAL MEDICINE

## 2025-07-22 RX ORDER — DEXTROAMPHETAMINE SACCHARATE, AMPHETAMINE ASPARTATE, DEXTROAMPHETAMINE SULFATE AND AMPHETAMINE SULFATE 3.75; 3.75; 3.75; 3.75 MG/1; MG/1; MG/1; MG/1
TABLET ORAL
Qty: 30 TABLET | Refills: 0 | Status: SHIPPED | OUTPATIENT
Start: 2025-08-21 | End: 2025-08-21

## 2025-07-22 RX ORDER — DEXTROAMPHETAMINE SACCHARATE, AMPHETAMINE ASPARTATE, DEXTROAMPHETAMINE SULFATE AND AMPHETAMINE SULFATE 3.75; 3.75; 3.75; 3.75 MG/1; MG/1; MG/1; MG/1
TABLET ORAL
Qty: 30 TABLET | Refills: 0 | Status: SHIPPED | OUTPATIENT
Start: 2025-07-22 | End: 2025-07-22

## 2025-07-22 RX ORDER — DEXTROAMPHETAMINE SACCHARATE, AMPHETAMINE ASPARTATE MONOHYDRATE, DEXTROAMPHETAMINE SULFATE AND AMPHETAMINE SULFATE 7.5; 7.5; 7.5; 7.5 MG/1; MG/1; MG/1; MG/1
30 CAPSULE, EXTENDED RELEASE ORAL DAILY
Qty: 30 CAPSULE | Refills: 0 | Status: SHIPPED | OUTPATIENT
Start: 2025-07-22 | End: 2025-08-21

## 2025-07-22 RX ORDER — DEXTROAMPHETAMINE SACCHARATE, AMPHETAMINE ASPARTATE MONOHYDRATE, DEXTROAMPHETAMINE SULFATE AND AMPHETAMINE SULFATE 7.5; 7.5; 7.5; 7.5 MG/1; MG/1; MG/1; MG/1
30 CAPSULE, EXTENDED RELEASE ORAL DAILY
Qty: 30 CAPSULE | Refills: 0 | Status: SHIPPED | OUTPATIENT
Start: 2025-08-21 | End: 2025-09-20

## 2025-07-22 RX ORDER — BUPROPION HYDROCHLORIDE 150 MG/1
150 TABLET ORAL EVERY MORNING
Qty: 30 TABLET | Refills: 3 | Status: SHIPPED | OUTPATIENT
Start: 2025-07-22

## 2025-07-22 RX ORDER — DEXTROAMPHETAMINE SACCHARATE, AMPHETAMINE ASPARTATE, DEXTROAMPHETAMINE SULFATE AND AMPHETAMINE SULFATE 3.75; 3.75; 3.75; 3.75 MG/1; MG/1; MG/1; MG/1
TABLET ORAL
Qty: 30 TABLET | Refills: 0 | Status: SHIPPED | OUTPATIENT
Start: 2025-09-20 | End: 2025-10-20

## 2025-07-22 RX ORDER — DEXTROAMPHETAMINE SACCHARATE, AMPHETAMINE ASPARTATE MONOHYDRATE, DEXTROAMPHETAMINE SULFATE AND AMPHETAMINE SULFATE 7.5; 7.5; 7.5; 7.5 MG/1; MG/1; MG/1; MG/1
30 CAPSULE, EXTENDED RELEASE ORAL DAILY
Qty: 30 CAPSULE | Refills: 0 | Status: SHIPPED | OUTPATIENT
Start: 2025-09-20 | End: 2025-12-19

## 2025-07-22 SDOH — ECONOMIC STABILITY: FOOD INSECURITY: WITHIN THE PAST 12 MONTHS, THE FOOD YOU BOUGHT JUST DIDN'T LAST AND YOU DIDN'T HAVE MONEY TO GET MORE.: NEVER TRUE

## 2025-07-22 SDOH — ECONOMIC STABILITY: FOOD INSECURITY: WITHIN THE PAST 12 MONTHS, YOU WORRIED THAT YOUR FOOD WOULD RUN OUT BEFORE YOU GOT MONEY TO BUY MORE.: NEVER TRUE

## 2025-07-22 ASSESSMENT — PATIENT HEALTH QUESTIONNAIRE - PHQ9
SUM OF ALL RESPONSES TO PHQ QUESTIONS 1-9: 2
1. LITTLE INTEREST OR PLEASURE IN DOING THINGS: SEVERAL DAYS
2. FEELING DOWN, DEPRESSED OR HOPELESS: SEVERAL DAYS
SUM OF ALL RESPONSES TO PHQ QUESTIONS 1-9: 2

## 2025-07-22 NOTE — PATIENT INSTRUCTIONS
For labs with follow-up as reviewed:    Please note:    --A1c (blood glucose/diabetes) monitoring can be repeated every 3mo as a non-fasting lab as needed.    --Cholesterol/lipids can be repeated every 3-6mo as needed, but this test is more helpful if you are fasting for labs.    --For fasting labs, recommend you do not take in any food or fluids with calories for 6-8 hours prior to labs being drawn.  --For non-fasting labs, there are no specific recommendations, as food/drink does not typically change the results of these labs.    --STI screening does not require fasting, but will be done with both blood and urine testing.

## 2025-07-22 NOTE — PROGRESS NOTES
7/22/2025    TELEHEALTH EVALUATION -- Audio/Visual    ASSESSMENT/PLAN:   Diagnosis Orders   1. Attention deficit hyperactivity disorder (ADHD), unspecified ADHD type  amphetamine-dextroamphetamine (ADDERALL) 15 MG tablet    amphetamine-dextroamphetamine (ADDERALL XR) 30 MG extended release capsule    amphetamine-dextroamphetamine (ADDERALL XR) 30 MG extended release capsule    amphetamine-dextroamphetamine (ADDERALL) 15 MG tablet    amphetamine-dextroamphetamine (ADDERALL XR) 30 MG extended release capsule    amphetamine-dextroamphetamine (ADDERALL) 15 MG tablet      2. Anxiety and depression  buPROPion (WELLBUTRIN XL) 150 MG extended release tablet          1:  Continue current medications.  Refill(s) and management reviewed.    2:  Medication(s), management and follow-up based on response reviewed at visit.  Monitoring and follow-up reviewed as below.    Assessment & Plan  1. 3. Attention deficit hyperactivity disorder.  He is currently on ADHD medications and is due for a refill. The potential to add Wellbutrin to his current ADHD medications was discussed, and it was concluded that they can be taken together. Prescriptions for his ADHD medications were renewed, including a 3 x 30-day supply of each of the 15 mg and 30 mg doses. The prescriptions will be sent to the pharmacy.    2-3. Anxiety.  He reports a worsening of general anxiety. The potential benefits and side effects of various medications were discussed, including Wellbutrin (bupropion) and SSRIs like Lexapro (escitalopram). Wellbutrin 150 mg extended release daily was recommended, which may also benefit his ADHD symptoms. He was advised to take it either in the morning or evening, depending on his preference. A prescription for Wellbutrin 150 mg extended release daily was provided with refills to last until the next visit. A virtual follow-up in 4 to 6 weeks was suggested to assess the need for dose adjustment.    --Depression.  He reports

## 2025-07-22 NOTE — PROGRESS NOTES
Virtual Visit    Chief Complaint   Patient presents with    Medication Refill     Patient needs medications refilled           There were no vitals filed for this visit.     Have you been to the ER, urgent care clinic since your last visit?  Hospitalized since your last visit?   NO    Have you seen or consulted any other health care providers outside our system since your last visit?   NO            Click Here for Release of Records Request   AVS  education, follow up, and recommendations provided and addressed with patient.  services used to advise patient